# Patient Record
Sex: MALE | Race: WHITE | NOT HISPANIC OR LATINO | Employment: OTHER | URBAN - NONMETROPOLITAN AREA
[De-identification: names, ages, dates, MRNs, and addresses within clinical notes are randomized per-mention and may not be internally consistent; named-entity substitution may affect disease eponyms.]

---

## 2017-01-12 ENCOUNTER — HOSPITAL ENCOUNTER (EMERGENCY)
Facility: HOSPITAL | Age: 67
Discharge: HOME | End: 2017-01-12
Attending: NURSE PRACTITIONER | Admitting: NURSE PRACTITIONER
Payer: MEDICARE

## 2017-01-12 VITALS
DIASTOLIC BLOOD PRESSURE: 100 MMHG | TEMPERATURE: 98.8 F | RESPIRATION RATE: 20 BRPM | SYSTOLIC BLOOD PRESSURE: 185 MMHG | OXYGEN SATURATION: 97 % | HEART RATE: 90 BPM

## 2017-01-12 DIAGNOSIS — J01.00 ACUTE MAXILLARY SINUSITIS, RECURRENCE NOT SPECIFIED: ICD-10-CM

## 2017-01-12 LAB
FLUAV+FLUBV AG SPEC QL: NEGATIVE
FLUAV+FLUBV AG SPEC QL: NORMAL
SPECIMEN SOURCE: NORMAL

## 2017-01-12 PROCEDURE — 87804 INFLUENZA ASSAY W/OPTIC: CPT | Mod: 59 | Performed by: FAMILY MEDICINE

## 2017-01-12 PROCEDURE — 99213 OFFICE O/P EST LOW 20 MIN: CPT | Performed by: NURSE PRACTITIONER

## 2017-01-12 PROCEDURE — 99213 OFFICE O/P EST LOW 20 MIN: CPT

## 2017-01-12 RX ORDER — BENZONATATE 100 MG/1
100 CAPSULE ORAL 3 TIMES DAILY PRN
Qty: 30 CAPSULE | Refills: 0 | Status: SHIPPED | OUTPATIENT
Start: 2017-01-12 | End: 2017-06-26

## 2017-01-12 RX ORDER — DOXYCYCLINE 100 MG/1
100 CAPSULE ORAL 2 TIMES DAILY
Qty: 20 CAPSULE | Refills: 0 | Status: SHIPPED | OUTPATIENT
Start: 2017-01-12 | End: 2017-01-22

## 2017-01-12 ASSESSMENT — ENCOUNTER SYMPTOMS
SORE THROAT: 1
COUGH: 1
ACTIVITY CHANGE: 0
SINUS PRESSURE: 1
TROUBLE SWALLOWING: 0
NAUSEA: 0
FEVER: 0
VOMITING: 0
DYSURIA: 0
WHEEZING: 0
PSYCHIATRIC NEGATIVE: 1
APPETITE CHANGE: 0
CHILLS: 0
RHINORRHEA: 1
SHORTNESS OF BREATH: 0

## 2017-01-12 NOTE — ED AVS SNAPSHOT
[facility]    [address] [address]    Phone:  [phone]                                       Donis Watt   MRN: [id number]    Department[facility]  [facility]   Date of Visit:  1/12/2017           Patient Information     Date Of Birth          1950        Your diagnoses for this visit were:     Acute maxillary sinusitis, recurrence not specified        You were seen by Mary Burton NP.      Follow-up Information     Follow up with Edinson London DO.    Specialty:  Family Practice    Why:  As needed, If symptoms worsen    Contact information:    [facility]  [address]  [facility] [address] [phone]          Follow up with [facility].    Specialty:  EMERGENCY MEDICINE    Why:  As needed, If symptoms worsen    Contact information:    [address] [address] [phone]    Additional information:    From [place]: Take [address]. Turn left at [address]. Turn left at the first stoplight on [address]. At the first stop sign, take a right onto [address]. Take a left into the parking lot and continue through until you reach the North enterance of the building.       From [place]: Take [address]. Take the [address] ramp towards [place]. Turn left onto [address]. Take a slight right onto [address]. Turn left at the first stoplight on [address]. At the first stop sign, take a right onto [address]. Take a left into the parking lot and continue through until you reach the North enterance of the building.       From [place]: Take [address]. Take a right at [address]. At the first stop sign, take a right onto [address]. Take a left into the parking lot and continue through until you reach the North enterance of the building.         Discharge Instructions       Take antibiotics as ordered.   Use  tessalon pearls as needed for cough as directed.   Increase fluid intake.  Rest.  Sleep in a recliner or prop extra pillows under head until symptoms subside.   Follow up with PCP with any increase in symptoms or concerns.   Return to urgent care or emergency department with any increase in symptoms or concerns.     Discharge References/Attachments     SINUSITIS (ANTIBIOTIC TREATMENT) (ENGLISH)         Review of your medicines      START taking        Dose / Directions Last dose taken    benzonatate 100 MG capsule   Commonly known as:  TESSALON   Dose:  100 mg   Quantity:  30 capsule        Take 1 capsule (100 mg) by mouth 3 times daily as needed for cough   Refills:  0        doxycycline 100 MG capsule   Commonly known as:  VIBRAMYCIN   Dose:  100 mg   Quantity:  20 capsule        Take 1 capsule (100 mg) by mouth 2 times daily for 10 days   Refills:  0          Our records show that you are taking the medicines listed below. If these are incorrect, please call your family doctor or clinic.        Dose / Directions Last dose taken    CRESTOR PO   Dose:  10 mg        Take 10 mg by mouth   Refills:  0        dextromethorphan-guaiFENesin  MG per 12 hr tablet   Commonly known as:  MUCINEX DM   Dose:  1 tablet        Take 1 tablet by mouth every 12 hours   Refills:  0        FLOMAX 0.4 MG capsule   Dose:  0.4 mg   Generic drug:  tamsulosin        Take 0.4 mg by mouth daily   Refills:  0        METFORMIN HCL PO   Dose:  500 mg        Take 500 mg by mouth 2 times daily (with meals)   Refills:  0        PANTOPRAZOLE SODIUM PO   Dose:  40 mg        Take 40 mg by mouth every morning (before breakfast)   Refills:  0        RAMIPRIL PO   Dose:  7.5 mg        Take 7.5 mg by mouth daily   Refills:  0        VITAMIN D (CHOLECALCIFEROL) PO   Dose:  400 Units        Take 400 Units by mouth 2 times daily   Refills:  0                Prescriptions were sent or printed at these locations (2 Prescriptions)                  "  [id number] - [place], [place] - [address] AT [facility] [address]   [address], [place] [address]    Telephone:  [phone]   Fax:  [phone]   Hours:                  E-Prescribed (2 of 2)         benzonatate (TESSALON) 100 MG capsule               doxycycline (VIBRAMYCIN) 100 MG capsule                Procedures and tests performed during your visit     Influenza A/B antigen      Orders Needing Specimen Collection     None      Pending Results     No orders found from 2017 to 2017.            Pending Culture Results     No orders found from 2017 to 2017.            Thank you for choosing [facility]       Thank you for choosing [facility] for your care. Our goal is always to provide you with excellent care. Hearing back from our patients is one way we can continue to improve our services. Please take a few minutes to complete the written survey that you may receive in the mail after you visit with us. Thank you!        [employer] Information     [employer] lets you send messages to your doctor, view your test results, renew your prescriptions, schedule appointments and more. To sign up, go to [email or web address] . Click on \"Log in\" on the left side of the screen, which will take you to the Welcome page. Then click on \"Sign up Now\" on the right side of the page.     You will be asked to enter the access code listed below, as well as some personal information. Please follow the directions to create your username and password.     Your access code is: [email or web address]  Expires: 2017  4:36 PM     Your access code will  in 90 days. If you need help or a new code, please call your [facility] clinic or [phone].        Care EveryWhere ID     This is your Care EveryWhere ID. This could be used by other organizations to access your [facility] medical records  [id number]        After Visit Summary       This is your record. Keep this with you and show to your community " pharmacist(s) and doctor(s) at your next visit.

## 2017-01-12 NOTE — ED PROVIDER NOTES
"  History     Chief Complaint   Patient presents with     URI     The history is provided by the patient. No  was used.     Donis Watt is a 66 year old male who presents with a cough, sinus pressure, and plugged ears for 6 days. He has taken Advil, muccinex, dayquil and Nyquil with mild effectiveness. Denies fever, chills, or night sweats. Eating and drinking well. Bowel and bladder are working well.  No fluctuations in blood glucose.     I have reviewed the Medications, Allergies, Past Medical and Surgical History, and Social History in the Epic system.    Review of Systems   Constitutional: Negative for fever, chills, activity change and appetite change.   HENT: Positive for congestion, ear pain, postnasal drip, rhinorrhea, sinus pressure and sore throat. Negative for ear discharge and trouble swallowing.    Eyes: Positive for discharge. Negative for photophobia, pain, redness, itching and visual disturbance.        \"watering eyes.\"   Respiratory: Positive for cough. Negative for shortness of breath and wheezing.    Cardiovascular: Negative for chest pain.   Gastrointestinal: Negative for nausea and vomiting.   Genitourinary: Negative for dysuria.   Skin: Negative for rash.   Psychiatric/Behavioral: Negative.        Physical Exam   BP: (!) 185/100 mmHg  Pulse: 90  Temp: 98.8  F (37.1  C)  Resp: 20  SpO2: 97 %  Physical Exam   Constitutional: He is oriented to person, place, and time. He appears well-developed and well-nourished. No distress.   HENT:   Left Ear: External ear normal.   Mouth/Throat: No oropharyngeal exudate.   Right middle ear effusion. No redness. Bilateral TM's intact. Maxillary sinus tenderness on palpation.    Eyes: Conjunctivae and EOM are normal. Pupils are equal, round, and reactive to light. Right eye exhibits no discharge. Left eye exhibits no discharge.   Neck: Normal range of motion. Neck supple.   Cardiovascular: Normal rate, regular rhythm and normal heart " sounds.    Pulmonary/Chest: Effort normal. No respiratory distress. He has no wheezes. He has no rales.   Abdominal: Soft. He exhibits no distension.   Musculoskeletal: Normal range of motion.   Lymphadenopathy:     He has no cervical adenopathy.   Neurological: He is alert and oriented to person, place, and time.   Skin: Skin is warm and dry. No rash noted. He is not diaphoretic.   Psychiatric: He has a normal mood and affect. His behavior is normal.   Nursing note and vitals reviewed.      ED Course   Procedures      Labs Ordered and Resulted from Time of ED Arrival Up to the Time of Departure from the ED   INFLUENZA A/B ANTIGEN     Results for orders placed or performed during the hospital encounter of 01/12/17   Influenza A/B antigen   Result Value Ref Range    Influenza A/B Agn Specimen Nares     Influenza A Negative NEG    Influenza B  NEG     Negative   Test results must be correlated with clinical data. If necessary, results   should be confirmed by a molecular assay or viral culture.       Ordered for BP check prior to discharge. LPN aware and verbalized understanding.     Assessments & Plan (with Medical Decision Making)     Discussed plan of care. Discussed viral syndrome versus bacterial. He wishes to be treated with antibiotics. Discussed if virus is causing symptoms that antibiotics won't be effective. Encouraged to monitor BP and follow up with PCP if elevation remains. Verbalized understanding.     I have reviewed the nursing notes.    I have reviewed the findings, diagnosis, plan and need for follow up with the patient.  Discharged in stable condition.     Discharge Medication List as of 1/12/2017  4:36 PM      START taking these medications    Details   benzonatate (TESSALON) 100 MG capsule Take 1 capsule (100 mg) by mouth 3 times daily as needed for cough, Disp-30 capsule, R-0, E-Prescribe      doxycycline (VIBRAMYCIN) 100 MG capsule Take 1 capsule (100 mg) by mouth 2 times daily for 10 days,  Disp-20 capsule, R-0, E-Prescribe             Final diagnoses:   Acute maxillary sinusitis, recurrence not specified     Take antibiotics as ordered.   Use tessalon pearls as needed for cough as directed.   Increase fluid intake.  Rest.  Sleep in a recliner or prop extra pillows under head until symptoms subside.   Follow up with PCP with any increase in symptoms or concerns.   Return to urgent care or emergency department with any increase in symptoms or concerns.     SHANNON Trevino  1/12/2017  4:04 PM  [facility]        Mary Burton NP  01/16/17 0900

## 2017-01-12 NOTE — ED AVS SNAPSHOT
[facility]    [address]    [place] [address]    Phone:  [phone]                                       Donis Watt   MRN: [id number]    Department[facility]  [facility]   Date of Visit:  1/12/2017           After Visit Summary Signature Page     I have received my discharge instructions, and my questions have been answered. I have discussed any challenges I see with this plan with the nurse or doctor.    ..........................................................................................................................................  Patient/Patient Representative Signature      ..........................................................................................................................................  Patient Representative Print Name and Relationship to Patient    ..................................................               ................................................  Date                                            Time    ..........................................................................................................................................  Reviewed by Signature/Title    ...................................................              ..............................................  Date                                                            Time

## 2017-01-12 NOTE — ED NOTES
Patient came in with nasal ,sinus congestion with watery eyes. Headache. Patient has experiences these symptoms since friday. Been taking mucinex 600 mg & cold & flu day & nighttime. Temp 98.8

## 2017-01-16 ASSESSMENT — ENCOUNTER SYMPTOMS
PHOTOPHOBIA: 0
EYE ITCHING: 0
EYE REDNESS: 0
EYE PAIN: 0
EYE DISCHARGE: 1

## 2017-01-16 NOTE — DISCHARGE INSTRUCTIONS
Take antibiotics as ordered.   Use tessalon pearls as needed for cough as directed.   Increase fluid intake.  Rest.  Sleep in a recliner or prop extra pillows under head until symptoms subside.   Follow up with PCP with any increase in symptoms or concerns.   Return to urgent care or emergency department with any increase in symptoms or concerns.

## 2017-06-26 ENCOUNTER — HOSPITAL ENCOUNTER (EMERGENCY)
Facility: HOSPITAL | Age: 67
Discharge: HOME | End: 2017-06-26
Attending: PHYSICIAN ASSISTANT | Admitting: PHYSICIAN ASSISTANT
Payer: MEDICARE

## 2017-06-26 VITALS
DIASTOLIC BLOOD PRESSURE: 88 MMHG | OXYGEN SATURATION: 95 % | HEART RATE: 75 BPM | RESPIRATION RATE: 20 BRPM | SYSTOLIC BLOOD PRESSURE: 120 MMHG | TEMPERATURE: 98.4 F

## 2017-06-26 DIAGNOSIS — R42 DIZZINESS: ICD-10-CM

## 2017-06-26 LAB
ALBUMIN SERPL-MCNC: 3.6 G/DL (ref 3.4–5)
ALBUMIN UR-MCNC: NEGATIVE MG/DL
ALP SERPL-CCNC: 85 U/L (ref 40–150)
ALT SERPL W P-5'-P-CCNC: 36 U/L (ref 0–70)
ANION GAP SERPL CALCULATED.3IONS-SCNC: 8 MMOL/L (ref 3–14)
APPEARANCE UR: CLEAR
AST SERPL W P-5'-P-CCNC: 17 U/L (ref 0–45)
BASOPHILS # BLD AUTO: 0.1 10E9/L (ref 0–0.2)
BASOPHILS NFR BLD AUTO: 0.7 %
BILIRUB SERPL-MCNC: 0.4 MG/DL (ref 0.2–1.3)
BILIRUB UR QL STRIP: NEGATIVE
BUN SERPL-MCNC: 21 MG/DL (ref 7–30)
CALCIUM SERPL-MCNC: 8.6 MG/DL (ref 8.5–10.1)
CHLORIDE SERPL-SCNC: 108 MMOL/L (ref 94–109)
CO2 SERPL-SCNC: 24 MMOL/L (ref 20–32)
COLOR UR AUTO: ABNORMAL
CREAT SERPL-MCNC: 0.86 MG/DL (ref 0.66–1.25)
DIFFERENTIAL METHOD BLD: NORMAL
EOSINOPHIL # BLD AUTO: 0.1 10E9/L (ref 0–0.7)
EOSINOPHIL NFR BLD AUTO: 1.5 %
ERYTHROCYTE [DISTWIDTH] IN BLOOD BY AUTOMATED COUNT: 13.6 % (ref 10–15)
GFR SERPL CREATININE-BSD FRML MDRD: 89 ML/MIN/1.7M2
GLUCOSE SERPL-MCNC: 284 MG/DL (ref 70–99)
GLUCOSE UR STRIP-MCNC: >1000 MG/DL
HCT VFR BLD AUTO: 44.5 % (ref 40–53)
HGB BLD-MCNC: 14.8 G/DL (ref 13.3–17.7)
HGB UR QL STRIP: NEGATIVE
IMM GRANULOCYTES # BLD: 0 10E9/L (ref 0–0.4)
IMM GRANULOCYTES NFR BLD: 0.5 %
KETONES UR STRIP-MCNC: NEGATIVE MG/DL
LEUKOCYTE ESTERASE UR QL STRIP: NEGATIVE
LYMPHOCYTES # BLD AUTO: 1.8 10E9/L (ref 0.8–5.3)
LYMPHOCYTES NFR BLD AUTO: 20.8 %
MCH RBC QN AUTO: 28.6 PG (ref 26.5–33)
MCHC RBC AUTO-ENTMCNC: 33.3 G/DL (ref 31.5–36.5)
MCV RBC AUTO: 86 FL (ref 78–100)
MONOCYTES # BLD AUTO: 0.8 10E9/L (ref 0–1.3)
MONOCYTES NFR BLD AUTO: 9.3 %
NEUTROPHILS # BLD AUTO: 5.7 10E9/L (ref 1.6–8.3)
NEUTROPHILS NFR BLD AUTO: 67.2 %
NITRATE UR QL: NEGATIVE
NRBC # BLD AUTO: 0 10*3/UL
NRBC BLD AUTO-RTO: 0 /100
PH UR STRIP: 5 PH (ref 4.7–8)
PLATELET # BLD AUTO: 187 10E9/L (ref 150–450)
POTASSIUM SERPL-SCNC: 3.9 MMOL/L (ref 3.4–5.3)
PROT SERPL-MCNC: 6.9 G/DL (ref 6.8–8.8)
RBC # BLD AUTO: 5.17 10E12/L (ref 4.4–5.9)
SODIUM SERPL-SCNC: 140 MMOL/L (ref 133–144)
SP GR UR STRIP: 1.03 (ref 1–1.03)
TROPONIN I SERPL-MCNC: NORMAL UG/L (ref 0–0.04)
TROPONIN I SERPL-MCNC: NORMAL UG/L (ref 0–0.04)
URN SPEC COLLECT METH UR: ABNORMAL
UROBILINOGEN UR STRIP-MCNC: NORMAL MG/DL (ref 0–2)
WBC # BLD AUTO: 8.5 10E9/L (ref 4–11)

## 2017-06-26 PROCEDURE — 25000132 ZZH RX MED GY IP 250 OP 250 PS 637: Mod: GY | Performed by: PHYSICIAN ASSISTANT

## 2017-06-26 PROCEDURE — 96360 HYDRATION IV INFUSION INIT: CPT

## 2017-06-26 PROCEDURE — 84484 ASSAY OF TROPONIN QUANT: CPT | Mod: 91 | Performed by: PHYSICIAN ASSISTANT

## 2017-06-26 PROCEDURE — 70450 CT HEAD/BRAIN W/O DYE: CPT | Mod: TC

## 2017-06-26 PROCEDURE — 93005 ELECTROCARDIOGRAM TRACING: CPT

## 2017-06-26 PROCEDURE — 99284 EMERGENCY DEPT VISIT MOD MDM: CPT | Performed by: PHYSICIAN ASSISTANT

## 2017-06-26 PROCEDURE — 25000128 H RX IP 250 OP 636: Performed by: PHYSICIAN ASSISTANT

## 2017-06-26 PROCEDURE — A9270 NON-COVERED ITEM OR SERVICE: HCPCS | Mod: GY | Performed by: PHYSICIAN ASSISTANT

## 2017-06-26 PROCEDURE — 96361 HYDRATE IV INFUSION ADD-ON: CPT

## 2017-06-26 PROCEDURE — 85025 COMPLETE CBC W/AUTO DIFF WBC: CPT | Performed by: PHYSICIAN ASSISTANT

## 2017-06-26 PROCEDURE — 93010 ELECTROCARDIOGRAM REPORT: CPT | Performed by: INTERNAL MEDICINE

## 2017-06-26 PROCEDURE — 36415 COLL VENOUS BLD VENIPUNCTURE: CPT | Performed by: PHYSICIAN ASSISTANT

## 2017-06-26 PROCEDURE — 99285 EMERGENCY DEPT VISIT HI MDM: CPT | Mod: 25

## 2017-06-26 PROCEDURE — 81003 URINALYSIS AUTO W/O SCOPE: CPT | Performed by: PHYSICIAN ASSISTANT

## 2017-06-26 PROCEDURE — 80053 COMPREHEN METABOLIC PANEL: CPT | Performed by: PHYSICIAN ASSISTANT

## 2017-06-26 RX ORDER — LIDOCAINE 40 MG/G
CREAM TOPICAL
Status: DISCONTINUED | OUTPATIENT
Start: 2017-06-26 | End: 2017-06-26 | Stop reason: HOSPADM

## 2017-06-26 RX ORDER — ASPIRIN 81 MG/1
162 TABLET, CHEWABLE ORAL ONCE
Status: COMPLETED | OUTPATIENT
Start: 2017-06-26 | End: 2017-06-26

## 2017-06-26 RX ORDER — SODIUM CHLORIDE 9 MG/ML
1000 INJECTION, SOLUTION INTRAVENOUS CONTINUOUS
Status: DISCONTINUED | OUTPATIENT
Start: 2017-06-26 | End: 2017-06-26 | Stop reason: HOSPADM

## 2017-06-26 RX ADMIN — ASPIRIN 81 MG 162 MG: 81 TABLET ORAL at 21:35

## 2017-06-26 RX ADMIN — SODIUM CHLORIDE 1000 ML: 9 INJECTION, SOLUTION INTRAVENOUS at 17:37

## 2017-06-26 RX ADMIN — SODIUM CHLORIDE 1000 ML: 9 INJECTION, SOLUTION INTRAVENOUS at 19:56

## 2017-06-26 ASSESSMENT — ENCOUNTER SYMPTOMS
SHORTNESS OF BREATH: 0
DIARRHEA: 0
APPETITE CHANGE: 0
VOMITING: 0
WEAKNESS: 0
DIZZINESS: 1
SPEECH DIFFICULTY: 0
ABDOMINAL PAIN: 0
LIGHT-HEADEDNESS: 1
ACTIVITY CHANGE: 0
PALPITATIONS: 0
NAUSEA: 0
CHILLS: 0
NUMBNESS: 0
CHEST TIGHTNESS: 0
PHOTOPHOBIA: 0
FEVER: 0
HEADACHES: 0

## 2017-06-26 NOTE — ED NOTES
"Reports having \"syncopal episode approx 45 minutes PTA to ER\" Wife reported \"he reported a sudden onset of dizziness and stated he just didn't feel right and was able to lower himself to the ground using treadmill to support himself at home.\" Denies other symptoms, just feels tired and wiped out.\" See assessments. Wife remains at bedside. Brought to room 9, monitoring placed and EKG being obtained.   "

## 2017-06-26 NOTE — ED NOTES
During Ortho Stats. pt. Denied feeling dizzy, lightheaded,or s/s of n/v. Finding was told to the nurse

## 2017-06-26 NOTE — ED AVS SNAPSHOT
[facility]    [address]    [place] [address]    Phone:  [phone]                                       Donis Watt   MRN: [id number]    Department[facility]  [facility]   Date of Visit:  6/26/2017           After Visit Summary Signature Page     I have received my discharge instructions, and my questions have been answered. I have discussed any challenges I see with this plan with the nurse or doctor.    ..........................................................................................................................................  Patient/Patient Representative Signature      ..........................................................................................................................................  Patient Representative Print Name and Relationship to Patient    ..................................................               ................................................  Date                                            Time    ..........................................................................................................................................  Reviewed by Signature/Title    ...................................................              ..............................................  Date                                                            Time

## 2017-06-26 NOTE — ED AVS SNAPSHOT
[facility]    [address] [address]    Phone:  [phone]                                       Donis Watt   MRN: [id number]    [facility]   Date of Visit:  6/26/2017           Patient Information     Date Of Birth          1950        Your diagnoses for this visit were:     Dizziness        You were seen by Arianna Rosas PA-C.      Follow-up Information     Schedule an appointment as soon as possible for a visit with Edinson London DO.    Specialty:  Family Practice    Contact information:    [facility]  [address]  [facility] [address]  [phone]          Follow up with [facility].    Specialty:  EMERGENCY MEDICINE    Why:  If symptoms worsen    Contact information:    [address] [address] [phone]    Additional information:    From [facility]: Take [address]. Turn left at [address]. Turn left at the first stoplight on [address]. At the first stop sign, take a right onto [address]. Take a left into the parking lot and continue through until you reach the North enterance of the building.       From [place]: Take [address]. Take the [address] ramp towards [place]. Turn left onto [address]. Take a slight right onto [address]. Turn left at the first stoplight on [address]. At the first stop sign, take a right onto [address]. Take a left into the parking lot and continue through until you reach the North enterance of the building.       From [place]: Take [address]. Take a right at [address]. At the first stop sign, take a right onto [address]. Take a left into the parking lot and continue through until you reach the North enterance of the building.         Discharge Instructions         Dizziness (Uncertain Cause)  Dizziness is a common symptom. It may be described as  lightheadedness, spinning, or feeling like you are going to faint. Dizziness can have many causes.  Be sure to tell the healthcare provider about:    All medicines you take, including prescription, over-the-counter, herbs, and supplements    Any other symptoms you have    Any health problems you are being treated for    Anything that causes the dizziness to get worse or better  Today's exam did not show an exact cause for your dizziness. Other tests may be needed. Follow up with your healthcare provider.  Home care    Dizziness that occurs with sudden standing may be a sign of mild dehydration. Drink extra fluids for the next few days.    If you recently started a new medicine, stopped a medicine, or had the dose of a current medicine changed, talk with the prescribing healthcare provider. Your medicine plan may need adjustment.    If dizziness lasts more than a few seconds, sit or lie down until it passes. This may help prevent injury in case you pass out.    Do not drive or use power tools or dangerous equipment until you have had no dizziness for at least 48 hours.  Follow-up care  Follow up with your healthcare provider for further evaluation within the next 7 days or as advised.  When to seek medical advice  Call your healthcare provider for any of the following:    Worsening of symptoms or new symptoms    Passing out or seizure    Repeated vomiting    Headache    Palpitations (the sense that your heart is fluttering or beating fast or hard)    Shortness of breath    Blood in vomit or stool (black or red color)    Weakness of an arm or leg or one side of the face    Vision or hearing changes    Trouble walking or speaking    Chest, arm, neck, back, or jaw pain  Date Last Reviewed: 8/23/2015 2000-2017 [employer]. [address] [place] [address], [place] [address]. All rights reserved. This information is not intended as a substitute for professional medical care. Always follow your healthcare  professional's instructions.      It would likely be in your best interest to start a baby aspirin daily.     This seem most consistent with vertigo after moving your head to take a drink, but sometimes these symptoms can represent an underlying brain perfusion issue.     Please follow-up with Dr. London this week for recheck.     Please return HERE for ANY worsening or other concerns whatsoever.        Review of your medicines      START taking        Dose / Directions Last dose taken    aspirin 81 MG tablet   Dose:  81 mg        Take 1 tablet (81 mg) by mouth daily   Refills:  OTC          Our records show that you are taking the medicines listed below. If these are incorrect, please call your family doctor or clinic.        Dose / Directions Last dose taken    canaliflozin tablet   Commonly known as:  INVOKANA        Take by mouth every morning (before breakfast)   Refills:  0        CRESTOR PO   Dose:  10 mg        Take 10 mg by mouth   Refills:  0        FLOMAX 0.4 MG capsule   Dose:  0.4 mg   Generic drug:  tamsulosin        Take 0.4 mg by mouth daily   Refills:  0        LASIX PO        Take by mouth daily   Refills:  0        PANTOPRAZOLE SODIUM PO   Dose:  40 mg        Take 40 mg by mouth every morning (before breakfast)   Refills:  0        RAMIPRIL PO   Dose:  7.5 mg        Take 7.5 mg by mouth daily   Refills:  0        VITAMIN D (CHOLECALCIFEROL) PO   Dose:  2000 Units        Take 2,000 Units by mouth 2 times daily   Refills:  0                Prescriptions were sent or printed at these locations (1 Prescription)                   Other Prescriptions                Not Printed or Sent (1 of 1)         aspirin 81 MG tablet                Procedures and tests performed during your visit     Procedure/Test Number of Times Performed    CBC with platelets differential 1    CT Head w/o Contrast 1    Comprehensive metabolic panel 1    EKG 12-lead, tracing only 1    Peripheral IV catheter 1    Troponin I 2     "UA reflex to Microscopic and Culture 1      Orders Needing Specimen Collection     None      Pending Results     Date and Time Order Name Status Description    2017 1925 CT Head w/o Contrast In process             Pending Culture Results     No orders found from 2017 to 2017.            Thank you for choosing [facility]       Thank you for choosing [facility] for your care. Our goal is always to provide you with excellent care. Hearing back from our patients is one way we can continue to improve our services. Please take a few minutes to complete the written survey that you may receive in the mail after you visit with us. Thank you!        [employer] Information     [employer] lets you send messages to your doctor, view your test results, renew your prescriptions, schedule appointments and more. To sign up, go to [email or web address] . Click on \"Log in\" on the left side of the screen, which will take you to the Welcome page. Then click on \"Sign up Now\" on the right side of the page.     You will be asked to enter the access code listed below, as well as some personal information. Please follow the directions to create your username and password.     Your access code is: [email or web address]  Expires: 2017  9:30 PM     Your access code will  in 90 days. If you need help or a new code, please call your [facility] clinic or [phone].        Care EveryWhere ID     This is your Care EveryWhere ID. This could be used by other organizations to access your [facility] medical records  [id number]        Equal Access to Services     EDGARDO PALOMO : Hadii kenisha bailey Sotony, waaxda luqadaha, qaybta kaalmada luanne horton . So [facility] [phone].    ATENCIÓN: Si habla español, tiene a hernandez disposición servicios gratuitos de asistencia lingüística. Llame al [phone].    We comply with applicable federal civil rights laws and [place] laws. We do not discriminate on the " basis of race, color, national origin, age, disability sex, sexual orientation or gender identity.            After Visit Summary       This is your record. Keep this with you and show to your community pharmacist(s) and doctor(s) at your next visit.

## 2017-06-27 NOTE — DISCHARGE INSTRUCTIONS
Dizziness (Uncertain Cause)  Dizziness is a common symptom. It may be described as lightheadedness, spinning, or feeling like you are going to faint. Dizziness can have many causes.  Be sure to tell the healthcare provider about:    All medicines you take, including prescription, over-the-counter, herbs, and supplements    Any other symptoms you have    Any health problems you are being treated for    Anything that causes the dizziness to get worse or better  Today's exam did not show an exact cause for your dizziness. Other tests may be needed. Follow up with your healthcare provider.  Home care    Dizziness that occurs with sudden standing may be a sign of mild dehydration. Drink extra fluids for the next few days.    If you recently started a new medicine, stopped a medicine, or had the dose of a current medicine changed, talk with the prescribing healthcare provider. Your medicine plan may need adjustment.    If dizziness lasts more than a few seconds, sit or lie down until it passes. This may help prevent injury in case you pass out.    Do not drive or use power tools or dangerous equipment until you have had no dizziness for at least 48 hours.  Follow-up care  Follow up with your healthcare provider for further evaluation within the next 7 days or as advised.  When to seek medical advice  Call your healthcare provider for any of the following:    Worsening of symptoms or new symptoms    Passing out or seizure    Repeated vomiting    Headache    Palpitations (the sense that your heart is fluttering or beating fast or hard)    Shortness of breath    Blood in vomit or stool (black or red color)    Weakness of an arm or leg or one side of the face    Vision or hearing changes    Trouble walking or speaking    Chest, arm, neck, back, or jaw pain  Date Last Reviewed: 8/23/2015 2000-2017 [employer]. [address] [place] [address], [place] [address]. All rights reserved. This information is not intended as  a substitute for professional medical care. Always follow your healthcare professional's instructions.      It would likely be in your best interest to start a baby aspirin daily.     This seem most consistent with vertigo after moving your head to take a drink, but sometimes these symptoms can represent an underlying brain perfusion issue.     Please follow-up with Dr. London this week for recheck.     Please return HERE for ANY worsening or other concerns whatsoever.

## 2017-06-27 NOTE — ED NOTES
Face to face report given with opportunity to observe patient.    Report given to Alexsandra TALLEY   6/26/2017  7:13 PM

## 2017-06-27 NOTE — ED PROVIDER NOTES
"  History     Chief Complaint   Patient presents with     Dizziness     started 30 minutes ago     The history is provided by the patient.     Donis Watt is a 67 year old male who presented to the ED ambulatory along with wife for evaluation of dizziness.  He reports feeling well throughout the day.  He walked to the fridge to get some soda and tipped his head back to take a drink.  He developed acute vertiginous symptoms of unsteadiness and feeling of wanting to fall backwards.  This resolved quickly, but returned.  He presented here feeling \"a little woozy\" but otherwise feels well.  No headaches.  No falls.  No blood thinners. No chest pain.  No palpitations.      I have reviewed the Medications, Allergies, Past Medical and Surgical History, and Social History in the Epic system.    Allergies:   Allergies   Allergen Reactions     Codeine      Anxiety.        No Clinical Screening - See Comments      All Narcotics makes him anxious.          No current facility-administered medications on file prior to encounter.   Current Outpatient Prescriptions on File Prior to Encounter:  VITAMIN D, CHOLECALCIFEROL, PO Take 2,000 Units by mouth 2 times daily    Rosuvastatin Calcium (CRESTOR PO) Take 10 mg by mouth   tamsulosin (FLOMAX) 0.4 MG 24 hr capsule Take 0.4 mg by mouth daily   PANTOPRAZOLE SODIUM PO Take 40 mg by mouth every morning (before breakfast)   RAMIPRIL PO Take 7.5 mg by mouth daily       There is no problem list on file for this patient.      No past surgical history on file.    Social History   Substance Use Topics     Smoking status: Former Smoker     Smokeless tobacco: Never Used     Alcohol use Yes      Comment: social         There is no immunization history on file for this patient.    BMI: There is no height or weight on file to calculate BMI.      Review of Systems   Constitutional: Negative for activity change, appetite change, chills and fever.   Eyes: Negative for photophobia and visual " disturbance.   Respiratory: Negative for chest tightness and shortness of breath.    Cardiovascular: Negative for chest pain and palpitations.   Gastrointestinal: Negative for abdominal pain, diarrhea, nausea and vomiting.   Genitourinary: Negative.    Skin: Negative.    Neurological: Positive for dizziness and light-headedness. Negative for syncope, speech difficulty, weakness, numbness and headaches.       Physical Exam   BP: 125/82  Pulse: 75  Heart Rate: 89  Temp: 98.4  F (36.9  C)  Resp: 20  SpO2: 92 %  Physical Exam   Constitutional: He is oriented to person, place, and time. He appears well-developed and well-nourished. No distress.   Pleasant and talkative    Cardiovascular: Normal rate and regular rhythm.    Pulmonary/Chest: Effort normal and breath sounds normal.   Abdominal: Soft. There is no tenderness. There is no guarding.   Obese    Neurological: He is alert and oriented to person, place, and time.   Neurological examination:  That the patient was awake and alert, the attention, orientation, concentration, language, memory and fund of knowledge were all normal.  The patient had no neglect.    Normal speech   Normal gait.   Normal finger to nose.   Normal rapid finger movement     Cranial nerve examination: revealed that for cranial nerve   II: the pupils were reactive and the visual field were full  III, IV, and VI, the extraocular movements were full.    VII: facial movements are symmetric  VIII: hearing intact to voice  IX & X: the soft palate rises symmetrically   XI: shoulder movements are symmetric  XII: tongue is midline   Skin: Skin is warm and dry.   Psychiatric: He has a normal mood and affect.   Nursing note and vitals reviewed.      ED Course     ED Course     Procedures        EKG shows a NSR with signs of incomplete RBBB.  There is nonspecific T wave inversions in V2.  No previous for comparison.      CT brain negative.      Medications   lidocaine 1 % 1 mL (not administered)   lidocaine  (LMX4) kit (not administered)   sodium chloride (PF) 0.9% PF flush 3 mL (not administered)   sodium chloride (PF) 0.9% PF flush 3 mL (3 mLs Intracatheter Given 6/26/17 1737)   0.9% sodium chloride BOLUS (0 mLs Intravenous Stopped 6/26/17 1934)     Followed by   0.9% sodium chloride infusion (0 mLs Intravenous Stopped 6/26/17 2131)   aspirin chewable tablet 162 mg (162 mg Oral Given 6/26/17 2135)     Results for orders placed or performed during the hospital encounter of 06/26/17 (from the past 24 hour(s))   CBC with platelets differential   Result Value Ref Range    WBC 8.5 4.0 - 11.0 10e9/L    RBC Count 5.17 4.4 - 5.9 10e12/L    Hemoglobin 14.8 13.3 - 17.7 g/dL    Hematocrit 44.5 40.0 - 53.0 %    MCV 86 78 - 100 fl    MCH 28.6 26.5 - 33.0 pg    MCHC 33.3 31.5 - 36.5 g/dL    RDW 13.6 10.0 - 15.0 %    Platelet Count 187 150 - 450 10e9/L    Diff Method Automated Method     % Neutrophils 67.2 %    % Lymphocytes 20.8 %    % Monocytes 9.3 %    % Eosinophils 1.5 %    % Basophils 0.7 %    % Immature Granulocytes 0.5 %    Nucleated RBCs 0 0 /100    Absolute Neutrophil 5.7 1.6 - 8.3 10e9/L    Absolute Lymphocytes 1.8 0.8 - 5.3 10e9/L    Absolute Monocytes 0.8 0.0 - 1.3 10e9/L    Absolute Eosinophils 0.1 0.0 - 0.7 10e9/L    Absolute Basophils 0.1 0.0 - 0.2 10e9/L    Abs Immature Granulocytes 0.0 0 - 0.4 10e9/L    Absolute Nucleated RBC 0.0    Comprehensive metabolic panel   Result Value Ref Range    Sodium 140 133 - 144 mmol/L    Potassium 3.9 3.4 - 5.3 mmol/L    Chloride 108 94 - 109 mmol/L    Carbon Dioxide 24 20 - 32 mmol/L    Anion Gap 8 3 - 14 mmol/L    Glucose 284 (H) 70 - 99 mg/dL    Urea Nitrogen 21 7 - 30 mg/dL    Creatinine 0.86 0.66 - 1.25 mg/dL    GFR Estimate 89 >60 mL/min/1.7m2    GFR Estimate If Black >90   GFR Calc   >60 mL/min/1.7m2    Calcium 8.6 8.5 - 10.1 mg/dL    Bilirubin Total 0.4 0.2 - 1.3 mg/dL    Albumin 3.6 3.4 - 5.0 g/dL    Protein Total 6.9 6.8 - 8.8 g/dL    Alkaline Phosphatase 85  40 - 150 U/L    ALT 36 0 - 70 U/L    AST 17 0 - 45 U/L   Troponin I   Result Value Ref Range    Troponin I ES  0.000 - 0.045 ug/L     <0.015  The 99th percentile for upper reference range is 0.045 ug/L.  Troponin values in   the range of 0.045 - 0.120 ug/L may be associated with risks of adverse   clinical events.     UA reflex to Microscopic and Culture   Result Value Ref Range    Color Urine Light Yellow     Appearance Urine Clear     Glucose Urine >1000 (A) NEG mg/dL    Bilirubin Urine Negative NEG    Ketones Urine Negative NEG mg/dL    Specific Gravity Urine 1.031 1.003 - 1.035    Blood Urine Negative NEG    pH Urine 5.0 4.7 - 8.0 pH    Protein Albumin Urine Negative NEG mg/dL    Urobilinogen mg/dL Normal 0.0 - 2.0 mg/dL    Nitrite Urine Negative NEG    Leukocyte Esterase Urine Negative NEG    Source Midstream Urine    Troponin I   Result Value Ref Range    Troponin I ES  0.000 - 0.045 ug/L     <0.015  The 99th percentile for upper reference range is 0.045 ug/L.  Troponin values in   the range of 0.045 - 0.120 ug/L may be associated with risks of adverse   clinical events.          Critical Care time:  none               Labs Ordered and Resulted from Time of ED Arrival Up to the Time of Departure from the ED   COMPREHENSIVE METABOLIC PANEL - Abnormal; Notable for the following:        Result Value    Glucose 284 (*)     All other components within normal limits   UA MACROSCOPIC WITH REFLEX TO MICRO AND CULTURE - Abnormal; Notable for the following:     Glucose Urine >1000 (*)     All other components within normal limits   CBC WITH PLATELETS DIFFERENTIAL   TROPONIN I   TROPONIN I   PERIPHERAL IV CATHETER       Assessments & Plan (with Medical Decision Making)   Mr. Watt was observed over a protracted period and did well.  Troponin x 2 due to EKG findings.  He returned to baseline and requested discharge home.  This is certainly reasonable.  Discussed starting ASA.  Seems most consistent with acute vertigo  when taking the drink of soda, but he is a high risk patient.  Neuro exam entirely normal.  Needs close follow-up in the clinic.  Stressed returning HERE for ANY return of symptoms,or other concerns whatsoever.  Mr. Watt voiced complete understanding and was happy and agreeable.     I have reviewed the nursing notes.    I have reviewed the findings, diagnosis, plan and need for follow up with the patient.       Discharge Medication List as of 6/26/2017  9:31 PM      START taking these medications    Details   aspirin 81 MG tablet Take 1 tablet (81 mg) by mouth daily, R-OTC, OTC             Final diagnoses:   Dizziness       6/26/2017   [facility]     Arianna Rosas PA-C  06/26/17 7196

## 2017-07-06 ENCOUNTER — HOSPITAL ENCOUNTER (EMERGENCY)
Facility: HOSPITAL | Age: 67
Discharge: HOME | End: 2017-07-06
Attending: NURSE PRACTITIONER | Admitting: NURSE PRACTITIONER
Payer: MEDICARE

## 2017-07-06 VITALS
SYSTOLIC BLOOD PRESSURE: 146 MMHG | DIASTOLIC BLOOD PRESSURE: 86 MMHG | OXYGEN SATURATION: 94 % | TEMPERATURE: 98.5 F | RESPIRATION RATE: 16 BRPM

## 2017-07-06 DIAGNOSIS — K08.89 PAIN, DENTAL: ICD-10-CM

## 2017-07-06 PROCEDURE — 99213 OFFICE O/P EST LOW 20 MIN: CPT | Performed by: NURSE PRACTITIONER

## 2017-07-06 PROCEDURE — 99213 OFFICE O/P EST LOW 20 MIN: CPT

## 2017-07-06 RX ORDER — AMOXICILLIN 500 MG/1
1000 CAPSULE ORAL 2 TIMES DAILY
Qty: 40 CAPSULE | Refills: 0 | Status: SHIPPED | OUTPATIENT
Start: 2017-07-06 | End: 2017-07-16

## 2017-07-06 RX ORDER — IBUPROFEN 800 MG/1
800 TABLET, FILM COATED ORAL EVERY 8 HOURS PRN
Qty: 60 TABLET | Refills: 0 | Status: SHIPPED | OUTPATIENT
Start: 2017-07-06 | End: 2017-07-14

## 2017-07-06 NOTE — ED AVS SNAPSHOT
[facility]    [address]    [place] [address]    Phone:  [phone]                                       Donis Watt   MRN: [id number]    Department:  [facility]   Date of Visit:  7/6/2017           After Visit Summary Signature Page     I have received my discharge instructions, and my questions have been answered. I have discussed any challenges I see with this plan with the nurse or doctor.    ..........................................................................................................................................  Patient/Patient Representative Signature      ..........................................................................................................................................  Patient Representative Print Name and Relationship to Patient    ..................................................               ................................................  Date                                            Time    ..........................................................................................................................................  Reviewed by Signature/Title    ...................................................              ..............................................  Date                                                            Time

## 2017-07-06 NOTE — ED AVS SNAPSHOT
[facility]    [address] [address]    Phone:  [phone]                                       Donis Watt   MRN: [id number]    Department:  [facility]   Date of Visit:  7/6/2017           Patient Information     Date Of Birth          1950        Your diagnoses for this visit were:     Pain, dental        You were seen by Lucy Turner NP.      Follow-up Information     Follow up with Edinson London DO.    Specialty:  Family Practice    Why:  As needed, If symptoms worsen    Contact information:    [facility]  [address]  [facility] [address] [phone]          Follow up with [facility].    Specialty:  EMERGENCY MEDICINE    Why:  As needed, If symptoms worsen    Contact information:    [address] [address] [phone]    Additional information:    From [facility] ([place], [place]): Take [address]. Turn left at [address]. Turn left at the first stoplight on [address]. At the first stop sign, take a right onto [address]. Take a left into the parking lot and continue through until you reach the North enterance of the building.       From [place]: Take [address]. Take the [address] ramp towards [place]. Turn left onto [address]. Take a slight right onto [address]. Turn left at the first stoplight on [address]. At the first stop sign, take a right onto [address]. Take a left into the parking lot and continue through until you reach the North enterance of the building.       From [place]: Take [address]. Take a right at [address]. At the first stop sign, take a right onto [address]. Take a left into the parking lot and continue through until you reach the North enterance of the building.       Discharge References/Attachments     DENTAL PAIN (ENGLISH)         Review of your medicines      START taking         Dose / Directions Last dose taken    amoxicillin 500 MG capsule   Commonly known as:  AMOXIL   Dose:  1000 mg   Quantity:  40 capsule        Take 2 capsules (1,000 mg) by mouth 2 times daily for 10 days   Refills:  0        ibuprofen 800 MG tablet   Commonly known as:  ADVIL/MOTRIN   Dose:  800 mg   Quantity:  60 tablet        Take 1 tablet (800 mg) by mouth every 8 hours as needed for moderate pain   Refills:  0          Our records show that you are taking the medicines listed below. If these are incorrect, please call your family doctor or clinic.        Dose / Directions Last dose taken    aspirin 81 MG tablet   Dose:  81 mg        Take 1 tablet (81 mg) by mouth daily   Refills:  OTC        canaliflozin tablet   Commonly known as:  INVOKANA        Take by mouth every morning (before breakfast)   Refills:  0        CRESTOR PO   Dose:  10 mg        Take 10 mg by mouth   Refills:  0        FLOMAX 0.4 MG capsule   Dose:  0.4 mg   Generic drug:  tamsulosin        Take 0.4 mg by mouth daily   Refills:  0        LASIX PO        Take by mouth daily   Refills:  0        PANTOPRAZOLE SODIUM PO   Dose:  40 mg        Take 40 mg by mouth every morning (before breakfast)   Refills:  0        RAMIPRIL PO   Dose:  7.5 mg        Take 7.5 mg by mouth daily   Refills:  0        VITAMIN D (CHOLECALCIFEROL) PO   Dose:  2000 Units        Take 2,000 Units by mouth 2 times daily   Refills:  0                Prescriptions were sent or printed at these locations (2 Prescriptions)                   [facility] [place] - [address] AT [facility] [address]   [address], [place] [address]    Telephone:  [phone]   Fax:  [phone]   Hours:                  E-Prescribed (2 of 2)         amoxicillin (AMOXIL) 500 MG capsule               ibuprofen (ADVIL/MOTRIN) 800 MG tablet                Orders Needing Specimen Collection     None      Pending Results     No orders found from 7/4/2017 to 7/7/2017.     "        Pending Culture Results     No orders found from 2017 to 2017.            Thank you for choosing [facility]       Thank you for choosing [facility] for your care. Our goal is always to provide you with excellent care. Hearing back from our patients is one way we can continue to improve our services. Please take a few minutes to complete the written survey that you may receive in the mail after you visit with us. Thank you!        [facility] Information     [facility] lets you send messages to your doctor, view your test results, renew your prescriptions, schedule appointments and more. To sign up, go to [email or web address] . Click on \"Log in\" on the left side of the screen, which will take you to the Welcome page. Then click on \"Sign up Now\" on the right side of the page.     You will be asked to enter the access code listed below, as well as some personal information. Please follow the directions to create your username and password.     Your access code is: [email or web address]  Expires: 2017  9:30 PM     Your access code will  in 90 days. If you need help or a new code, please call your [facility] clinic or [phone].        Care EveryWhere ID     This is your Care EveryWhere ID. This could be used by other organizations to access your [facility] medical records  [id number]        Equal Access to Services     EDGARDO PALOMO AH: Hadthania Turcios, waaxda luqadaha, qaybta kaalmada adejosue, luanne kasper . So [facility] [phone].    ATENCIÓN: Si habla español, tiene a hernandez disposición servicios gratuitos de asistencia lingüística. Llame al [phone].    We comply with applicable federal civil rights laws and [place] laws. We do not discriminate on the basis of race, color, national origin, age, disability sex, sexual orientation or gender identity.            After Visit Summary       This is your record. Keep this with you and show to your community pharmacist(s) " and doctor(s) at your next visit.

## 2017-07-07 NOTE — ED NOTES
Ambulated to 5 with c/o right sided lower dental pain, states that he lost the filling in the tooth and he is unable to get into the dentist.

## 2017-07-07 NOTE — ED PROVIDER NOTES
History     Chief Complaint   Patient presents with     Dental Pain     R sided, started yesterday     The history is provided by the patient. No  was used.     Donis Watt is a 67 year old male who has had a toothache right lower molar has tried Anbesol, excedrin, advil ,  He last took medication about 1600. He lost a cavity 6-8 months ago.  Can't get into his dentist until 7/10/2017.  He actually drove into the dentist office today as he was unable to get a hold of them.  They are not open again until Monday.  He does see a local dentist.  He is concerned he may need an antibiotic.  Ramu mcmullen had fever or other signs of systemic infection    I have reviewed the Medications, Allergies, Past Medical and Surgical History, and Social History in the Epic system.  PCP sees Dr London        Review of Systems   Constitutional: Negative.  Negative for activity change, appetite change, chills and fever.   HENT: Positive for dental problem. Negative for sore throat and trouble swallowing.    Eyes: Negative.    Respiratory: Negative.    Cardiovascular: Negative.    Gastrointestinal: Negative.  Negative for diarrhea, nausea and vomiting.   Genitourinary: Negative.    Musculoskeletal: Negative.    Skin: Negative.    Neurological: Negative.    Hematological: Negative.        Physical Exam   BP: 146/86  Heart Rate: 78  Temp: 98.5  F (36.9  C)  Resp: 16  SpO2: 94 %  Physical Exam   Constitutional: He is oriented to person, place, and time. He appears well-developed and well-nourished. No distress.   HENT:   Head: Normocephalic and atraumatic.   Right Ear: External ear normal.   Left Ear: External ear normal.   Nose: Nose normal.   Mouth/Throat: Oropharynx is clear and moist. No oropharyngeal exudate.   He has a missing filling from his last molar on the right lower, there are no obvious signs of infection along the gum.  It looks minimally irritated.  His teeth are in poor repair t/o   Eyes: Conjunctivae  and EOM are normal. Pupils are equal, round, and reactive to light.   Neck: Normal range of motion. Neck supple.   Cardiovascular: Normal rate.    Pulmonary/Chest: Effort normal.   Abdominal: Soft.   Musculoskeletal: Normal range of motion.   Neurological: He is alert and oriented to person, place, and time.   Skin: Skin is warm and dry. He is not diaphoretic.   Nursing note and vitals reviewed.      ED Course     ED Course     Procedures               Labs Ordered and Resulted from Time of ED Arrival Up to the Time of Departure from the ED - No data to display    Assessments & Plan (with Medical Decision Making)     I have reviewed the nursing notes.  May or may not have infection.  Needs dentist asap for the pain.  Was provided with antbx and rx ibuprofen.  May also use tylenol up to 4000 mg daily. ICE or heat may help.  Declined the temporary filling paste we have here.     Pathophysiology, possible etiology and treatment with potential outcomes, risks, benefits, and alternatives discussed to the best of my ability        I have reviewed the findings, diagnosis, plan and need for follow up with the patient.      Discharge Medication List as of 7/6/2017  8:43 PM      START taking these medications    Details   amoxicillin (AMOXIL) 500 MG capsule Take 2 capsules (1,000 mg) by mouth 2 times daily for 10 days, Disp-40 capsule, R-0, E-Prescribe      ibuprofen (ADVIL/MOTRIN) 800 MG tablet Take 1 tablet (800 mg) by mouth every 8 hours as needed for moderate pain, Disp-60 tablet, R-0, E-Prescribe             Final diagnoses:   Pain, dental   Pt verbalizes understanding and agreement with plan.  Follow up for worsening symptoms      7/6/2017   [facility]     Lucy Turner NP  07/08/17 [id number]

## 2017-07-08 ASSESSMENT — ENCOUNTER SYMPTOMS
ACTIVITY CHANGE: 0
EYES NEGATIVE: 1
CONSTITUTIONAL NEGATIVE: 1
SORE THROAT: 0
RESPIRATORY NEGATIVE: 1
HEMATOLOGIC/LYMPHATIC NEGATIVE: 1
CARDIOVASCULAR NEGATIVE: 1
APPETITE CHANGE: 0
NAUSEA: 0
GASTROINTESTINAL NEGATIVE: 1
NEUROLOGICAL NEGATIVE: 1
DIARRHEA: 0
CHILLS: 0
FEVER: 0
TROUBLE SWALLOWING: 0
MUSCULOSKELETAL NEGATIVE: 1
VOMITING: 0

## 2017-07-13 ENCOUNTER — MEDICAL CORRESPONDENCE (OUTPATIENT)
Dept: HEALTH INFORMATION MANAGEMENT | Facility: HOSPITAL | Age: 67
End: 2017-07-13

## 2017-07-20 ENCOUNTER — TELEPHONE (OUTPATIENT)
Dept: WOUND CARE | Facility: OTHER | Age: 67
End: 2017-07-20

## 2017-07-20 DIAGNOSIS — E11.65 TYPE 2 DIABETES MELLITUS WITH HYPERGLYCEMIA, WITHOUT LONG-TERM CURRENT USE OF INSULIN (H): Primary | ICD-10-CM

## 2017-11-09 ENCOUNTER — HOSPITAL ENCOUNTER (EMERGENCY)
Facility: HOSPITAL | Age: 67
Discharge: HOME | End: 2017-11-09
Attending: PHYSICIAN ASSISTANT | Admitting: PHYSICIAN ASSISTANT
Payer: MEDICARE

## 2017-11-09 VITALS
HEART RATE: 103 BPM | HEIGHT: 70 IN | OXYGEN SATURATION: 96 % | RESPIRATION RATE: 20 BRPM | DIASTOLIC BLOOD PRESSURE: 96 MMHG | TEMPERATURE: 100.4 F | SYSTOLIC BLOOD PRESSURE: 174 MMHG

## 2017-11-09 DIAGNOSIS — N39.0 URINARY TRACT INFECTION WITHOUT HEMATURIA, SITE UNSPECIFIED: ICD-10-CM

## 2017-11-09 LAB
ALBUMIN UR-MCNC: NEGATIVE MG/DL
APPEARANCE UR: CLEAR
BACTERIA #/AREA URNS HPF: ABNORMAL /HPF
BILIRUB UR QL STRIP: NEGATIVE
COLOR UR AUTO: ABNORMAL
GLUCOSE UR STRIP-MCNC: >1000 MG/DL
HGB UR QL STRIP: NEGATIVE
KETONES UR STRIP-MCNC: NEGATIVE MG/DL
LEUKOCYTE ESTERASE UR QL STRIP: ABNORMAL
MUCOUS THREADS #/AREA URNS LPF: PRESENT /LPF
NITRATE UR QL: NEGATIVE
PH UR STRIP: 6.5 PH (ref 4.7–8)
RBC #/AREA URNS AUTO: 1 /HPF (ref 0–2)
SOURCE: ABNORMAL
SP GR UR STRIP: 1.01 (ref 1–1.03)
UROBILINOGEN UR STRIP-MCNC: NORMAL MG/DL (ref 0–2)
WBC #/AREA URNS AUTO: 53 /HPF (ref 0–2)

## 2017-11-09 PROCEDURE — 87086 URINE CULTURE/COLONY COUNT: CPT | Performed by: PHYSICIAN ASSISTANT

## 2017-11-09 PROCEDURE — 99213 OFFICE O/P EST LOW 20 MIN: CPT

## 2017-11-09 PROCEDURE — 99213 OFFICE O/P EST LOW 20 MIN: CPT | Performed by: PHYSICIAN ASSISTANT

## 2017-11-09 PROCEDURE — 81001 URINALYSIS AUTO W/SCOPE: CPT | Performed by: PHYSICIAN ASSISTANT

## 2017-11-09 RX ORDER — SULFAMETHOXAZOLE/TRIMETHOPRIM 800-160 MG
1 TABLET ORAL 2 TIMES DAILY
Qty: 20 TABLET | Refills: 0 | Status: SHIPPED | OUTPATIENT
Start: 2017-11-09 | End: 2017-11-19

## 2017-11-09 RX ORDER — PHENAZOPYRIDINE HYDROCHLORIDE 200 MG/1
200 TABLET, FILM COATED ORAL 3 TIMES DAILY
Qty: 9 TABLET | Refills: 0 | Status: SHIPPED | OUTPATIENT
Start: 2017-11-09 | End: 2017-11-12

## 2017-11-09 ASSESSMENT — ENCOUNTER SYMPTOMS
FATIGUE: 0
ABDOMINAL PAIN: 0
FLANK PAIN: 0
NAUSEA: 0
FREQUENCY: 1
PSYCHIATRIC NEGATIVE: 1
CARDIOVASCULAR NEGATIVE: 1
DYSURIA: 1
NEUROLOGICAL NEGATIVE: 1
HEMATURIA: 0
DIFFICULTY URINATING: 0
CHILLS: 0
FEVER: 0
BACK PAIN: 0

## 2017-11-09 NOTE — ED AVS SNAPSHOT
[facility]    [address]    [place] [address]    Phone:  [phone]                                       Donis Watt   MRN: [id number]    Department:  [facility]   Date of Visit:  11/9/2017           After Visit Summary Signature Page     I have received my discharge instructions, and my questions have been answered. I have discussed any challenges I see with this plan with the nurse or doctor.    ..........................................................................................................................................  Patient/Patient Representative Signature      ..........................................................................................................................................  Patient Representative Print Name and Relationship to Patient    ..................................................               ................................................  Date                                            Time    ..........................................................................................................................................  Reviewed by Signature/Title    ...................................................              ..............................................  Date                                                            Time

## 2017-11-09 NOTE — ED AVS SNAPSHOT
[facility]    [address] [address]    Phone:  [phone]                                       Donis Watt   MRN: [id number]    Department:  [facility]   Date of Visit:  11/9/2017           Patient Information     Date Of Birth          1950        Your diagnoses for this visit were:     Urinary tract infection without hematuria, site unspecified        You were seen by Ryan Ulloa PA.      Follow-up Information     Follow up with Edinson London DO In 3 days.    Specialty:  Family Practice    Why:  As needed    Contact information:    [facility]  [address] [facility] [address] [phone]          Follow up with [facility].    Specialty:  EMERGENCY MEDICINE    Why:  If symptoms worsen    Contact information:    [address] [address] [phone]    Additional information:    From [place]: Take [address]. Turn left at [address]. Turn left at the first stoplight on [address] [place] [address]. At the first stop sign, take a right onto [address]. Take a left into the parking lot and continue through until you reach the North enterance of the building.       From [place]: Take [address]. Take the [address] ramp towards [place]. Turn left onto [address]. Take a slight right onto [address] [facility]. Turn left at the first stoplight on [address]. At the first stop sign, take a right onto [address]. Take a left into the parking lot and continue through until you reach the North enterance of the building.       From [place]: Take [address]. Take a right at [address]. At the first stop sign, take a right onto [address]. Take a left into the parking lot and continue through until you reach the North enterance of the building.         Discharge Instructions       - Pyridium for discomfort and urgency  - Bactrim to cover  infection  - Back here with any worsening, belly pain, flank pain    * We will call you if we need to change medication based on the culture.     Discharge References/Attachments     BLADDER INFECTION, MALE (ADULT) (ENGLISH)         Review of your medicines      START taking        Dose / Directions Last dose taken    phenazopyridine 200 MG tablet   Commonly known as:  PYRIDIUM   Dose:  200 mg   Quantity:  9 tablet        Take 1 tablet (200 mg) by mouth 3 times daily for 3 days   Refills:  0        sulfamethoxazole-trimethoprim 800-160 MG per tablet   Commonly known as:  BACTRIM DS   Dose:  1 tablet   Quantity:  20 tablet        Take 1 tablet by mouth 2 times daily for 10 days   Refills:  0          Our records show that you are taking the medicines listed below. If these are incorrect, please call your family doctor or clinic.        Dose / Directions Last dose taken    aspirin 81 MG tablet   Dose:  81 mg        Take 1 tablet (81 mg) by mouth daily   Refills:  OTC        CRESTOR PO   Dose:  10 mg        Take 10 mg by mouth   Refills:  0        FLOMAX 0.4 MG capsule   Dose:  0.4 mg   Generic drug:  tamsulosin        Take 0.4 mg by mouth daily   Refills:  0        PANTOPRAZOLE SODIUM PO   Dose:  40 mg        Take 40 mg by mouth every morning (before breakfast)   Refills:  0        RAMIPRIL PO   Dose:  7.5 mg        Take 7.5 mg by mouth daily   Refills:  0        TRULICITY SC        Inject Subcutaneous once a week   Refills:  0        VITAMIN D (CHOLECALCIFEROL) PO   Dose:  2000 Units        Take 2,000 Units by mouth 2 times daily   Refills:  0                Prescriptions were sent or printed at these locations (2 Prescriptions)                   [id number] - [place] - [address] AT [facility] [address] [place] [address]    Telephone:  [phone]   Fax:  [phone]   Hours:                  E-Prescribed (2 of 2)         sulfamethoxazole-trimethoprim (BACTRIM DS)  "800-160 MG per tablet               phenazopyridine (PYRIDIUM) 200 MG tablet                Procedures and tests performed during your visit     UA with Microscopic reflex to Culture    Urine Culture Aerobic Bacterial      Orders Needing Specimen Collection     None      Pending Results     Date and Time Order Name Status Description    2017 191 Urine Culture Aerobic Bacterial In process             Pending Culture Results     Date and Time Order Name Status Description    2017 191 Urine Culture Aerobic Bacterial In process             Thank you for choosing [facility]       Thank you for choosing [facility] for your care. Our goal is always to provide you with excellent care. Hearing back from our patients is one way we can continue to improve our services. Please take a few minutes to complete the written survey that you may receive in the mail after you visit with us. Thank you!        [employer] Information     [employer] lets you send messages to your doctor, view your test results, renew your prescriptions, schedule appointments and more. To sign up, go to [email or web address] . Click on \"Log in\" on the left side of the screen, which will take you to the Welcome page. Then click on \"Sign up Now\" on the right side of the page.     You will be asked to enter the access code listed below, as well as some personal information. Please follow the directions to create your username and password.     Your access code is: [email or web address]  Expires: 2018  7:31 PM     Your access code will  in 90 days. If you need help or a new code, please call your [facility] clinic or [phone].        Care EveryWhere ID     This is your Care EveryWhere ID. This could be used by other organizations to access your [facility] medical records  [id number]        Equal Access to Services     [facility] DEWEY : Hadii kenisha Turcios, madison rucker, luanne garcia. So " [facility] [phone].    ATENCIÓN: Si habla español, tiene a hernandez disposición servicios gratuitos de asistencia lingüística. Llame al [phone].    We comply with applicable federal civil rights laws and [place] laws. We do not discriminate on the basis of race, color, national origin, age, disability, sex, sexual orientation, or gender identity.            After Visit Summary       This is your record. Keep this with you and show to your community pharmacist(s) and doctor(s) at your next visit.

## 2017-11-10 NOTE — ED PROVIDER NOTES
"  History     Chief Complaint   Patient presents with     Urinary Frequency     With urgency X 3 hours, patient concerned for UTI.     The history is provided by the patient. No  was used.     Donis Watt is a 67 year old male with Hx DM, prostatitis who presents with intense urinary urgency and frequency starting this evening. He reports having gone 6x in under 2 hrs. Minimal burning. No abdominal pain. No Hx kidney stones. No fevers, but 100.4 in triage.    Problem List:    There are no active problems to display for this patient.       Past Medical History:    No past medical history on file.    Past Surgical History:    No past surgical history on file.    Family History:    No family history on file.    Social History:  Marital Status:   [2]  Social History   Substance Use Topics     Smoking status: Former Smoker     Smokeless tobacco: Never Used     Alcohol use Yes      Comment: social        Medications:      Dulaglutide (TRULICITY SC)   sulfamethoxazole-trimethoprim (BACTRIM DS) 800-160 MG per tablet   phenazopyridine (PYRIDIUM) 200 MG tablet   aspirin 81 MG tablet   VITAMIN D, CHOLECALCIFEROL, PO   Rosuvastatin Calcium (CRESTOR PO)   tamsulosin (FLOMAX) 0.4 MG 24 hr capsule   PANTOPRAZOLE SODIUM PO   RAMIPRIL PO         Review of Systems   Constitutional: Negative for chills, fatigue and fever.   Cardiovascular: Negative.    Gastrointestinal: Negative for abdominal pain and nausea.   Genitourinary: Positive for dysuria, frequency and urgency. Negative for difficulty urinating, flank pain and hematuria.   Musculoskeletal: Negative for back pain.   Skin: Negative.    Neurological: Negative.    Psychiatric/Behavioral: Negative.        Physical Exam   BP: 174/96  Pulse: 103  Temp: 100.4  F (38  C)  Resp: 20  Height: 177.8 cm (5' 10\")  SpO2: 96 %      Physical Exam   Constitutional: He is oriented to person, place, and time. He appears well-developed and well-nourished. No " distress.   Cardiovascular: Normal rate, regular rhythm and normal heart sounds.    Pulmonary/Chest: Effort normal and breath sounds normal.   Abdominal: Soft. Bowel sounds are normal. There is no tenderness (NO CVA).   Neurological: He is alert and oriented to person, place, and time.   Skin: Skin is warm and dry.   Psychiatric: He has a normal mood and affect.   Nursing note and vitals reviewed.      ED Course     ED Course     Procedures    Labs Ordered and Resulted from Time of ED Arrival Up to the Time of Departure from the ED   ROUTINE UA WITH MICROSCOPIC REFLEX TO CULTURE - Abnormal; Notable for the following:        Result Value    Glucose Urine >1000 (*)     Leukocyte Esterase Urine Moderate (*)     WBC Urine 53 (*)     Bacteria Urine None (*)     Mucous Urine Present (*)     All other components within normal limits   URINE CULTURE AEROBIC BACTERIAL       Assessments & Plan (with Medical Decision Making)     I have reviewed the nursing notes.  I have reviewed the findings, diagnosis, plan and need for follow up with the patient.    Discharge Medication List as of 11/9/2017  7:32 PM      START taking these medications    Details   sulfamethoxazole-trimethoprim (BACTRIM DS) 800-160 MG per tablet Take 1 tablet by mouth 2 times daily for 10 days, Disp-20 tablet, R-0, E-Prescribe      phenazopyridine (PYRIDIUM) 200 MG tablet Take 1 tablet (200 mg) by mouth 3 times daily for 3 days, Disp-9 tablet, R-0, E-Prescribe             Final diagnoses:   Urinary tract infection without hematuria, site unspecified   Will treat as above. Will recheck here over the weekend if no response to antibiotic. Will contact patient with any changes in plan based on culture. Patient agreeable to plan, verbally educated and given appropriate education sheets for each of the diagnoses and has no questions.    Ryan Ulloa PA-C   11/9/2017   7:55 PM    11/9/2017   [facility]     Ryan Ulloa PA  11/09/17 1955

## 2017-11-10 NOTE — DISCHARGE INSTRUCTIONS
- Pyridium for discomfort and urgency  - Bactrim to cover infection  - Back here with any worsening, belly pain, flank pain    * We will call you if we need to change medication based on the culture.

## 2017-11-11 LAB
BACTERIA SPEC CULT: ABNORMAL
SPECIMEN SOURCE: ABNORMAL

## 2018-02-27 ENCOUNTER — TRANSFERRED RECORDS (OUTPATIENT)
Dept: HEALTH INFORMATION MANAGEMENT | Facility: CLINIC | Age: 68
End: 2018-02-27

## 2018-09-17 ENCOUNTER — TRANSFERRED RECORDS (OUTPATIENT)
Dept: HEALTH INFORMATION MANAGEMENT | Facility: CLINIC | Age: 68
End: 2018-09-17

## 2018-09-17 LAB
ALT SERPL-CCNC: 43 U/L (ref 18–65)
AST SERPL-CCNC: 25 U/L (ref 10–40)
CHOLEST SERPL-MCNC: 163 MG/DL
CREAT SERPL-MCNC: 0.86 MG/DL (ref 0.8–1.5)
GFR SERPL CREATININE-BSD FRML MDRD: >60 ML/MIN/1.73M2
GLUCOSE SERPL-MCNC: 196 MG/DL (ref 60–99)
HBA1C MFR BLD: 9.9 % (ref 4–6)
HDLC SERPL-MCNC: 32 MG/DL
LDLC SERPL CALC-MCNC: 104 MG/DL
POTASSIUM SERPL-SCNC: 4 MEQ/L (ref 3.5–5.1)
TRIGL SERPL-MCNC: 137 MG/DL

## 2018-11-30 ENCOUNTER — HOSPITAL ENCOUNTER (OUTPATIENT)
Dept: EDUCATION SERVICES | Facility: HOSPITAL | Age: 68
Discharge: HOME | End: 2018-11-30
Attending: NURSE PRACTITIONER | Admitting: FAMILY MEDICINE
Payer: MEDICARE

## 2018-11-30 VITALS
OXYGEN SATURATION: 95 % | BODY MASS INDEX: 39.1 KG/M2 | DIASTOLIC BLOOD PRESSURE: 88 MMHG | HEART RATE: 77 BPM | HEIGHT: 70 IN | SYSTOLIC BLOOD PRESSURE: 135 MMHG | WEIGHT: 273.1 LBS

## 2018-11-30 DIAGNOSIS — E11.65 TYPE 2 DIABETES MELLITUS WITH HYPERGLYCEMIA, WITHOUT LONG-TERM CURRENT USE OF INSULIN (H): Primary | ICD-10-CM

## 2018-11-30 PROCEDURE — G0108 DIAB MANAGE TRN  PER INDIV: HCPCS | Performed by: DIETITIAN, REGISTERED

## 2018-11-30 ASSESSMENT — PAIN SCALES - GENERAL: PAINLEVEL: NO PAIN (0)

## 2018-11-30 NOTE — LETTER
"    11/30/2018        RE: Donis Watt  [address]  [facility] [address]        Diabetes Self-Management Education & Support    Diabetes Education Self Management & Training    SUBJECTIVE/OBJECTIVE:  Presents for: Individual review  Accompanied by:  González)  Diabetes education in the past 24mo: No  Focus of Visit: Reducing Risks  Diabetes type: Type 2  Date of diagnosis: 2008  Disease course: Worsening  How confident are you filling out medical forms by yourself:: Somewhat  Diabetes management related comments/concerns: Lowering glucose levels, lose weight.  Transportation concerns: No  Other concerns:: None  Cultural Influences/Ethnic Background:  American    Diabetes Symptoms & Complications  Blurred vision: Yes  Fatigue: No  Neuropathy: No  Foot ulcerations: No  Polydipsia: No  Polyphagia: No  Polyuria: No  Visual change: Yes  Weakness: No  Weight loss: No  Slow healing wounds: No  Recent Infection(s): No  Symptom course: Stable  Weight trend: Stable  Autonomic neuropathy: No  CVA: No  Heart disease: No  Nephropathy: No  Peripheral neuropathy: No  Peripheral Vascular Disease: No  Retinopathy: No    Patient Problem List and Family Medical History reviewed for relevant medical history, current medical status, and diabetes risk factors.    Vitals:  /88  Pulse 77  Ht 1.772 m (5' 9.75\")  Wt 123.9 kg (273 lb 1.6 oz)  SpO2 95%  BMI 39.47 kg/m2  Estimated body mass index is 39.47 kg/(m^2) as calculated from the following:    Height as of this encounter: 1.772 m (5' 9.75\").    Weight as of this encounter: 123.9 kg (273 lb 1.6 oz).   Last 3 BP:   BP Readings from Last 3 Encounters:   11/30/18 135/88   11/09/17 174/96   07/06/17 146/86       History   Smoking Status     Former Smoker   Smokeless Tobacco     Never Used       Labs:  Lab Results   Component Value Date    A1C 9.8 09/17/2018     Lab Results   Component Value Date     09/17/2018     Lab Results   Component Value Date     09/17/2018 "     HDL Cholesterol   Date Value Ref Range Status   2018 32 (L) >=60 mg/dL Final   ]  GFR Estimate   Date Value Ref Range Status   2017 89 >60 mL/min/1.7m2 Final     Comment:     Non  GFR Calc     GFR Estimate If Black   Date Value Ref Range Status   2017 >90   GFR Calc   >60 mL/min/1.7m2 Final     Lab Results   Component Value Date    CR 0.86 2017     No results found for: MICROALBUMIN    Healthy Eating  Healthy Eating Assessed Today: Yes  Cultural/Congregation diet restrictions?: No  Patient on a regular basis: Eats 3 meals a day, Eats out more than once a week  Meal planning: Avoiding sweets  Meals include: Breakfast, Lunch, Dinner, Snacks  Breakfast: bagel and hard boiled egg  Lunch: salami sandwich  Dinner: 2 grilled chesse sandwiches or pasta  Snacks:  sugar free candy, nuts, chips  Beverages: Water, Milk (Crystal Lite)  Has patient met with a dietitian in the past?: Yes    Being Active  Being Active Assessed Today: Yes (started using treadmill)  Exercise:: Yes  Days per week of moderate to strenuous exercise (like a brisk walk): 7  On average, minutes per day of exercise at this level: 30  How intense was your typical exercise? : Light (like stretching or slow walking)  Exercise Minutes per Week: 210  Barrier to exercise: Physical limitation (has some hip issues)    Monitoring  Monitoring Assessed Today: Yes  Did patient bring glucose meter to appointment? : Yes (strips are )  Blood Glucose Meter: Other (GlucEpulsard Vital)  Home Glucose (Sugar) Monitoring: Other (Pt testing very sporadically.  Strips . )  New meter provided today.     Taking Medications  Diabetes Medication(s)     Incretin Mimetic Agents (GLP-1 Receptor Agonists) Sig    Dulaglutide (TRULICITY SC) Inject Subcutaneous once a week        Taking Medication Assessed Today: Yes  Current Treatments: Non-insulin Injectables (Trulicity 0.75 mg weekly. Stopped Invokana r/t excessive  urination. )  Problems taking diabetes medications regularly?: Yes  Diabetes medication side effects?: No  Treatment Compliance: All of the time    Problem Solving  Problem Solving Assessed Today: Yes  Hypoglycemia Frequency: Never     Reducing Risks  Diabetes Risks: Age over 45 years, Sedentary Lifestyle, Family History  CAD Risks: Diabetes Mellitus, Male sex, Obesity, Family history, Sedentary lifestyle, Stress  Has dilated eye exam at least once a year?: Yes  Sees dentist every 6 months?: Yes  Sees podiatrist (foot doctor)?: No    Healthy Coping  Healthy Coping Assessed Today: Yes  Emotional response to diabetes: Ready to learn  Informal Support system:: Spouse  Stage of change: PREPARATION (Decided to change - considering how)  Difficulty affording diabetes management supplies?: No  Support resources: None  Patient Activation Measure Survey Score:  No flowsheet data found.    ASSESSMENT:  Pt was diagnosed with diabetes about 10 years ago.  He had education at that time and initially did well but has had no education since.  He reports much stress in his life in the past year and thinks that may play a role in his elevated A1c.  His wife is present with him and she appears supportive.  They would like a review of meal planning today.  Pt does not want any medication adjustments until after next A1c check - I suspect he will need some.     Patient's most recent   Lab Results   Component Value Date    A1C 9.8 09/17/2018    is not meeting goal of <7.0    INTERVENTION:   Diabetes knowledge and skills assessment:     Patient is knowledgeable in diabetes management concepts related to: needs review in all areas.     Patient needs further education on the following diabetes management concepts: Healthy Eating and Being Active, monitoring, taking medications, risk reduction, healthy coping.     Based on learning assessment above, most appropriate setting for further diabetes education would be: Individual  setting.    Education provided today on:  AADE Self-Care Behaviors:  Healthy Eating: carbohydrate counting, consistency in amount, composition, and timing of food intake, weight reduction, portion control and label reading  Being Active: relationship to blood glucose and benefits in promoting weight loss.   Monitoring: purpose, proper technique, individual blood glucose targets, frequency of monitoring and proper sharps disposal  Reducing Risks: major complications of diabetes and A1C - goals, relating to blood glucose levels, how often to check  Patient was instructed on One Touch Verio meter and was able to provide an accurate return demonstration. Patient's blood glucose reading today was 169 mg/dL fasting. 2    Opportunities for ongoing education and support in diabetes-self management were discussed.    Pt verbalized understanding of concepts discussed and recommendations provided today.       Education Materials Provided:  Carbohydrate counting meal plan, food logs.     PLAN:  Follow carbohydrate counting meal plan - 60 grams/meal, 15-30 grams/snack.  Keep food logs to help you stay on track.   Continue to walk on treadmill daily with goal of at least 30 minutes daily.   Test glucose 2x/day - fasting and 2 hours after supper.   See Patient Instructions for co-developed, patient-stated behavior change goals.  AVS printed and provided to patient today. See Follow-Up section for recommended follow-up.    Time Spent: 75 minutes  Encounter Type: Individual    Any diabetes medication dose changes were made via the CDE Protocol and Collaborative Practice Agreement with the patient's referring provider. A copy of this encounter was shared with the provider.        Sincerely,        Ria Morillo RD

## 2018-11-30 NOTE — IP AVS SNAPSHOT
MRN:[id number]                      After Visit Summary   11/30/2018    Donis Watt    MRN: [id number]           Thank you!     Thank you for choosing [facility] for your care. Our goal is always to provide you with excellent care. Hearing back from our patients is one way we can continue to improve our services. Please take a few minutes to complete the written survey that you may receive in the mail after you visit with us. Thank you!        Patient Information     Date Of Birth          1950        About your hospital stay     You were admitted on:  November 30, 2018 You last received care in the:  [facility]    You were discharged on:  November 30, 2018       Who to Call     For medical emergencies, please call 911.  For non-urgent questions about your medical care, please call your primary care provider or clinic, [phone]          Attending Provider     Provider Specialty    Ermelinda Menendez APRN CNP Family Practice       Primary Care Provider Office Phone # Fax #    Edinson LondonDO [phone]      Follow-up notes from your care team     Return in about 4 weeks (around 12/28/2018).      Care Instructions    -Follow carbohydrate counting meal plan - 60 grams/meal, 15-30 grams/snack.   -Keep food logs to help you stay on track.   -Continue to walk on treadmill - goal is at least 30 minutes most days of the week.   -Test your glucose 2x/day - fasting and 2 hours after supper.   -Target levels are fasting , 2 hours after supper less than 180.   -Bring your meter to follow up session.   -Follow up in January.   -Call with any concerns - PATRIZIA Short, [phone].          Pending Results     No orders found from 11/28/2018 to 12/1/2018.            Admission Information     Date & Time Provider Department Dept. Phone    11/30/2018 Ermelinda Menendez APRN CNP [facility] [phone]      Your Vitals Were     Blood  "Pressure Pulse Height Weight Pulse Oximetry BMI (Body Mass Index)    162/104 77 1.772 m (5' 9.75\") 123.9 kg (273 lb 1.6 oz) 95% 39.47 kg/m2      Care EveryWhere ID     This is your Care EveryWhere ID. This could be used by other organizations to access your [facility] medical records  [id number]        Equal Access to Services     [facility]: Hadii aad ku hadasho Soomaali, waaxda luqadaha, qaybta kaalmada adeegyada, waxay maudein hayaan adeguillermo owens lacourtneyn ah. So [facility] [phone].    ATENCIÓN: Si habla español, tiene a hernandez disposición servicios gratuitos de asistencia lingüística. Tom al [phone].    We comply with applicable federal civil rights laws and [place] laws. We do not discriminate on the basis of race, color, national origin, age, disability, sex, sexual orientation, or gender identity.               Review of your medicines      UNREVIEWED medicines. Ask your doctor about these medicines        Dose / Directions    aspirin 81 MG tablet   Commonly known as:  ASA        Dose:  81 mg   Take 1 tablet (81 mg) by mouth daily   Refills:  OTC       CRESTOR PO        Dose:  10 mg   Take 10 mg by mouth   Refills:  0       FLOMAX 0.4 MG capsule   Generic drug:  tamsulosin        Dose:  0.4 mg   Take 0.4 mg by mouth daily   Refills:  0       PANTOPRAZOLE SODIUM PO        Dose:  40 mg   Take 40 mg by mouth every morning (before breakfast)   Refills:  0       RAMIPRIL PO        Dose:  7.5 mg   Take 7.5 mg by mouth daily   Refills:  0       TRULICITY SC        Inject Subcutaneous once a week   Refills:  0       VITAMIN D (CHOLECALCIFEROL) PO        Dose:  2000 Units   Take 2,000 Units by mouth 2 times daily   Refills:  0                Protect others around you: Learn how to safely use, store and throw away your medicines at www.disposemymeds.org.             Medication List: This is a list of all your medications and when to take them. Check marks below indicate your daily home schedule. Keep this list as a " reference.      Medications           Morning Afternoon Evening Bedtime As Needed    aspirin 81 MG tablet   Commonly known as:  ASA   Take 1 tablet (81 mg) by mouth daily                                CRESTOR PO   Take 10 mg by mouth                                FLOMAX 0.4 MG capsule   Take 0.4 mg by mouth daily   Generic drug:  tamsulosin                                PANTOPRAZOLE SODIUM PO   Take 40 mg by mouth every morning (before breakfast)                                RAMIPRIL PO   Take 7.5 mg by mouth daily                                TRULICITY SC   Inject Subcutaneous once a week                                VITAMIN D (CHOLECALCIFEROL) PO   Take 2,000 Units by mouth 2 times daily

## 2018-11-30 NOTE — PATIENT INSTRUCTIONS
-Follow carbohydrate counting meal plan - 60 grams/meal, 15-30 grams/snack.   -Keep food logs to help you stay on track. .   -Continue to walk on treadmill - goal is at least 30 minutes most days of the week.   -Test your glucose 2x/day - fasting and 2 hours after supper.   -Target levels are fasting , 2 hours after supper less than 180.   -Bring your meter to follow up session.   -Follow up in January.   -Call with any concerns - PATRIZIA Short, -[phone].

## 2018-11-30 NOTE — IP AVS SNAPSHOT
[facility]    [address] [place] [id number]    Phone:  [phone]    Fax:  [phone]                                       After Visit Summary   11/30/2018    Donis Watt    MRN: [id number]           After Visit Summary Signature Page     I have received my discharge instructions, and my questions have been answered. I have discussed any challenges I see with this plan with the nurse or doctor.    ..........................................................................................................................................  Patient/Patient Representative Signature      ..........................................................................................................................................  Patient Representative Print Name and Relationship to Patient    ..................................................               ................................................  Date                                   Time    ..........................................................................................................................................  Reviewed by Signature/Title    ...................................................              ..............................................  Date                                               Time          22EPIC Rev 08/18

## 2018-12-03 NOTE — PROGRESS NOTES
"Diabetes Self-Management Education & Support    Diabetes Education Self Management & Training    SUBJECTIVE/OBJECTIVE:  Presents for: Individual review  Accompanied by:  González)  Diabetes education in the past 24mo: No  Focus of Visit: Reducing Risks  Diabetes type: Type 2  Date of diagnosis: 2008  Disease course: Worsening  How confident are you filling out medical forms by yourself:: Somewhat  Diabetes management related comments/concerns: Lowering glucose levels, lose weight.  Transportation concerns: No  Other concerns:: None  Cultural Influences/Ethnic Background:  American    Diabetes Symptoms & Complications  Blurred vision: Yes  Fatigue: No  Neuropathy: No  Foot ulcerations: No  Polydipsia: No  Polyphagia: No  Polyuria: No  Visual change: Yes  Weakness: No  Weight loss: No  Slow healing wounds: No  Recent Infection(s): No  Symptom course: Stable  Weight trend: Stable  Autonomic neuropathy: No  CVA: No  Heart disease: No  Nephropathy: No  Peripheral neuropathy: No  Peripheral Vascular Disease: No  Retinopathy: No    Patient Problem List and Family Medical History reviewed for relevant medical history, current medical status, and diabetes risk factors.    Vitals:  /88  Pulse 77  Ht 1.772 m (5' 9.75\")  Wt 123.9 kg (273 lb 1.6 oz)  SpO2 95%  BMI 39.47 kg/m2  Estimated body mass index is 39.47 kg/(m^2) as calculated from the following:    Height as of this encounter: 1.772 m (5' 9.75\").    Weight as of this encounter: 123.9 kg (273 lb 1.6 oz).   Last 3 BP:   BP Readings from Last 3 Encounters:   11/30/18 135/88   11/09/17 174/96   07/06/17 146/86       History   Smoking Status     Former Smoker   Smokeless Tobacco     Never Used       Labs:  Lab Results   Component Value Date    A1C 9.8 09/17/2018     Lab Results   Component Value Date     09/17/2018     Lab Results   Component Value Date     09/17/2018     HDL Cholesterol   Date Value Ref Range Status   09/17/2018 32 (L) >=60 mg/dL " Final   ]  GFR Estimate   Date Value Ref Range Status   2017 89 >60 mL/min/1.7m2 Final     Comment:     Non  GFR Calc     GFR Estimate If Black   Date Value Ref Range Status   2017 >90   GFR Calc   >60 mL/min/1.7m2 Final     Lab Results   Component Value Date    CR 0.86 2017     No results found for: MICROALBUMIN    Healthy Eating  Healthy Eating Assessed Today: Yes  Cultural/Moravian diet restrictions?: No  Patient on a regular basis: Eats 3 meals a day, Eats out more than once a week  Meal planning: Avoiding sweets  Meals include: Breakfast, Lunch, Dinner, Snacks  Breakfast: bagel and hard boiled egg  Lunch: salami sandwich  Dinner: 2 grilled chesse sandwiches or pasta  Snacks:  sugar free candy, nuts, chips  Beverages: Water, Milk (Crystal Lite)  Has patient met with a dietitian in the past?: Yes    Being Active  Being Active Assessed Today: Yes (started using treadmill)  Exercise:: Yes  Days per week of moderate to strenuous exercise (like a brisk walk): 7  On average, minutes per day of exercise at this level: 30  How intense was your typical exercise? : Light (like stretching or slow walking)  Exercise Minutes per Week: 210  Barrier to exercise: Physical limitation (has some hip issues)    Monitoring  Monitoring Assessed Today: Yes  Did patient bring glucose meter to appointment? : Yes (strips are )  Blood Glucose Meter: Other (Glucocaard Vital)  Home Glucose (Sugar) Monitoring: Other (Pt testing very sporadically.  Strips . )  New meter provided today.     Taking Medications  Diabetes Medication(s)     Incretin Mimetic Agents (GLP-1 Receptor Agonists) Sig    Dulaglutide (TRULICITY SC) Inject Subcutaneous once a week        Taking Medication Assessed Today: Yes  Current Treatments: Non-insulin Injectables (Trulicity 0.75 mg weekly. Stopped Invokana r/t excessive urination. )  Problems taking diabetes medications regularly?: Yes  Diabetes  medication side effects?: No  Treatment Compliance: All of the time    Problem Solving  Problem Solving Assessed Today: Yes  Hypoglycemia Frequency: Never     Reducing Risks  Diabetes Risks: Age over 45 years, Sedentary Lifestyle, Family History  CAD Risks: Diabetes Mellitus, Male sex, Obesity, Family history, Sedentary lifestyle, Stress  Has dilated eye exam at least once a year?: Yes  Sees dentist every 6 months?: Yes  Sees podiatrist (foot doctor)?: No    Healthy Coping  Healthy Coping Assessed Today: Yes  Emotional response to diabetes: Ready to learn  Informal Support system:: Spouse  Stage of change: PREPARATION (Decided to change - considering how)  Difficulty affording diabetes management supplies?: No  Support resources: None  Patient Activation Measure Survey Score:  No flowsheet data found.    ASSESSMENT:  Pt was diagnosed with diabetes about 10 years ago.  He had education at that time and initially did well but has had no education since.  He reports much stress in his life in the past year and thinks that may play a role in his elevated A1c.  His wife is present with him and she appears supportive.  They would like a review of meal planning today.  Pt does not want any medication adjustments until after next A1c check - I suspect he will need some.     Patient's most recent   Lab Results   Component Value Date    A1C 9.8 09/17/2018    is not meeting goal of <7.0    INTERVENTION:   Diabetes knowledge and skills assessment:     Patient is knowledgeable in diabetes management concepts related to: needs review in all areas.     Patient needs further education on the following diabetes management concepts: Healthy Eating and Being Active, monitoring, taking medications, risk reduction, healthy coping.     Based on learning assessment above, most appropriate setting for further diabetes education would be: Individual setting.    Education provided today on:  AADE Self-Care Behaviors:  Healthy Eating:  carbohydrate counting, consistency in amount, composition, and timing of food intake, weight reduction, portion control and label reading  Being Active: relationship to blood glucose and benefits in promoting weight loss.   Monitoring: purpose, proper technique, individual blood glucose targets, frequency of monitoring and proper sharps disposal  Reducing Risks: major complications of diabetes and A1C - goals, relating to blood glucose levels, how often to check  Patient was instructed on One Touch Verio meter and was able to provide an accurate return demonstration. Patient's blood glucose reading today was 169 mg/dL fasting. 2    Opportunities for ongoing education and support in diabetes-self management were discussed.    Pt verbalized understanding of concepts discussed and recommendations provided today.       Education Materials Provided:  Carbohydrate counting meal plan, food logs.     PLAN:  Follow carbohydrate counting meal plan - 60 grams/meal, 15-30 grams/snack.  Keep food logs to help you stay on track.   Continue to walk on treadmill daily with goal of at least 30 minutes daily.   Test glucose 2x/day - fasting and 2 hours after supper.   See Patient Instructions for co-developed, patient-stated behavior change goals.  AVS printed and provided to patient today. See Follow-Up section for recommended follow-up.    Time Spent: 75 minutes  Encounter Type: Individual    Any diabetes medication dose changes were made via the CDE Protocol and Collaborative Practice Agreement with the patient's referring provider. A copy of this encounter was shared with the provider.

## 2019-01-08 ENCOUNTER — TRANSFERRED RECORDS (OUTPATIENT)
Dept: HEALTH INFORMATION MANAGEMENT | Facility: CLINIC | Age: 69
End: 2019-01-08

## 2019-01-08 LAB — HBA1C MFR BLD: 7.2 % (ref 0–5.7)

## 2019-01-17 ENCOUNTER — HOSPITAL ENCOUNTER (OUTPATIENT)
Dept: EDUCATION SERVICES | Facility: HOSPITAL | Age: 69
Discharge: HOME | End: 2019-01-17
Attending: FAMILY MEDICINE | Admitting: FAMILY MEDICINE
Payer: MEDICARE

## 2019-01-17 VITALS
HEART RATE: 71 BPM | RESPIRATION RATE: 16 BRPM | BODY MASS INDEX: 38.61 KG/M2 | WEIGHT: 269.7 LBS | HEIGHT: 70 IN | OXYGEN SATURATION: 97 % | DIASTOLIC BLOOD PRESSURE: 90 MMHG | SYSTOLIC BLOOD PRESSURE: 160 MMHG

## 2019-01-17 DIAGNOSIS — E11.9 TYPE 2 DIABETES MELLITUS WITHOUT COMPLICATION, WITHOUT LONG-TERM CURRENT USE OF INSULIN (H): Primary | ICD-10-CM

## 2019-01-17 PROCEDURE — G0108 DIAB MANAGE TRN  PER INDIV: HCPCS | Performed by: DIETITIAN, REGISTERED

## 2019-01-17 ASSESSMENT — ANXIETY QUESTIONNAIRES
1. FEELING NERVOUS, ANXIOUS, OR ON EDGE: MORE THAN HALF THE DAYS
IF YOU CHECKED OFF ANY PROBLEMS ON THIS QUESTIONNAIRE, HOW DIFFICULT HAVE THESE PROBLEMS MADE IT FOR YOU TO DO YOUR WORK, TAKE CARE OF THINGS AT HOME, OR GET ALONG WITH OTHER PEOPLE: SOMEWHAT DIFFICULT
5. BEING SO RESTLESS THAT IT IS HARD TO SIT STILL: NOT AT ALL
7. FEELING AFRAID AS IF SOMETHING AWFUL MIGHT HAPPEN: NOT AT ALL
GAD7 TOTAL SCORE: 7
2. NOT BEING ABLE TO STOP OR CONTROL WORRYING: MORE THAN HALF THE DAYS
6. BECOMING EASILY ANNOYED OR IRRITABLE: SEVERAL DAYS
3. WORRYING TOO MUCH ABOUT DIFFERENT THINGS: SEVERAL DAYS

## 2019-01-17 ASSESSMENT — PAIN SCALES - GENERAL: PAINLEVEL: NO PAIN (0)

## 2019-01-17 ASSESSMENT — PATIENT HEALTH QUESTIONNAIRE - PHQ9
SUM OF ALL RESPONSES TO PHQ QUESTIONS 1-9: 4
5. POOR APPETITE OR OVEREATING: SEVERAL DAYS

## 2019-01-17 ASSESSMENT — MIFFLIN-ST. JEOR: SCORE: 2000.63

## 2019-01-17 NOTE — LETTER
"    1/17/2019        RE: Donis Watt  [address]  [facility] [address]        Diabetes Self-Management Education & Support    Diabetes Education Self Management & Training    SUBJECTIVE/OBJECTIVE:  Presents for: Individual review  Accompanied by: González)  Diabetes education in the past 24mo: No  Focus of Visit: Reducing Risks  Diabetes type: Type 2  Date of diagnosis: 2008  Disease course: Worsening  How confident are you filling out medical forms by yourself:: Somewhat  Diabetes management related comments/concerns: Lowering glucose levels, lose weight.  Transportation concerns: No  Other concerns:: None  Cultural Influences/Ethnic Background:  American    Diabetes Symptoms & Complications  Blurred vision: Yes(Has an appointment next Friday)  Fatigue: No  Neuropathy: No  Foot ulcerations: No  Polydipsia: No  Polyphagia: No  Polyuria: No  Visual change: Yes  Weakness: No  Weight loss: No  Slow healing wounds: No  Recent Infection(s): No  Other: Yes(started therapy for his shoulder)  Symptom course: Stable  Weight trend: Stable  Autonomic neuropathy: No  CVA: No  Heart disease: No  Nephropathy: No  Peripheral neuropathy: No  Peripheral Vascular Disease: No  Retinopathy: No    Patient Problem List and Family Medical History reviewed for relevant medical history, current medical status, and diabetes risk factors.    Vitals:  /90   Pulse 71   Resp 16   Ht 1.772 m (5' 9.75\")   Wt 122.3 kg (269 lb 11.2 oz)   SpO2 97%   BMI 38.98 kg/m     Estimated body mass index is 38.98 kg/m  as calculated from the following:    Height as of this encounter: 1.772 m (5' 9.75\").    Weight as of this encounter: 122.3 kg (269 lb 11.2 oz).   Weight is down 3.4# since last visit 11/30.   Last 3 BP:   BP Readings from Last 3 Encounters:   01/17/19 160/90   11/30/18 135/88   11/09/17 174/96   Pt encouraged to recheck BP at home as he does have cuff and to notify provider if levels remain consistently elevated.      History "   Smoking Status     Former Smoker   Smokeless Tobacco     Never Used       Labs:  Lab Results   Component Value Date    A1C 7.2 2019     Lab Results   Component Value Date     2018     Lab Results   Component Value Date     2018     HDL Cholesterol   Date Value Ref Range Status   2018 32 (L) >=60 mg/dL Final   ]  GFR Estimate   Date Value Ref Range Status   2017 89 >60 mL/min/1.7m2 Final     Comment:     Non  GFR Calc     GFR Estimate If Black   Date Value Ref Range Status   2017 >90   GFR Calc   >60 mL/min/1.7m2 Final     Lab Results   Component Value Date    CR 0.86 2017     No results found for: MICROALBUMIN    Healthy Eating  Healthy Eating Assessed Today: Yes  Cultural/Rastafarian diet restrictions?: No  Meal planning: Avoiding sweets  Meals include: Breakfast, Lunch, Dinner, Snacks  Breakfast: 2 slices bread - butter, 2 eggs, 2 turk, coffee  Lunch: sandwich, coffee or crystal light  Dinner: meat, salad, sometimes small portion of starch, milk or crystal light  Snacks: yogurt, sugar free candy   Beverages: Water, Milk(Crystal Lite)  Has patient met with a dietitian in the past?: Yes    Being Active  Being Active Assessed Today: Yes(started using treadmill)  Exercise:: Yes  Days per week of moderate to strenuous exercise (like a brisk walk): 4  On average, minutes per day of exercise at this level: 30  How intense was your typical exercise? : Moderate (like brisk walking)  Exercise Minutes per Week: 120  Barrier to exercise: Physical limitation(has some hip issues)    Monitoring  Monitoring Assessed Today: Yes  Did patient bring glucose meter to appointment? : Yes(strips are )  Blood Glucose Meter: One Touch  Home Glucose (Sugar) Monitorin-2 times per day  Blood glucose trend: Decreasing steadily  [id number]  2 hours post [id number]  Two week average is 158.     Taking Medications  Diabetes Medication(s)      Incretin Mimetic Agents (GLP-1 Receptor Agonists) Sig    dulaglutide (TRULICITY) 0.75 MG/0.5ML pen Inject Subcutaneous once a week        Taking Medication Assessed Today: Yes  Current Treatments: Non-insulin Injectables(Trulicity 0.75 mg weekly. Stopped Invokana r/t excessive urination. )  Problems taking diabetes medications regularly?: Yes  Diabetes medication side effects?: No  Treatment Compliance: All of the time    Problem Solving  Problem Solving Assessed Today: Yes  Hypoglycemia Frequency: Never    Reducing Risks  Diabetes Risks: Age over 45 years, Sedentary Lifestyle, Family History  CAD Risks: Diabetes Mellitus, Male sex, Obesity, Family history, Sedentary lifestyle, Stress  Has dilated eye exam at least once a year?: Yes  Sees dentist every 6 months?: Yes  Sees podiatrist (foot doctor)?: No    Healthy Coping  Healthy Coping Assessed Today: Yes  Emotional response to diabetes: Ready to learn  Informal Support system:: Spouse  Stage of change: PREPARATION (Decided to change - considering how)  Difficulty affording diabetes management supplies?: No  Support resources: None  Patient Activation Measure Survey Score:  No flowsheet data found.    ASSESSMENT:  Recent A1c much improved at 7.2% which is down from 9.9% in September.  Discussion notes that pt has made many positive changes to his diet.  His significant other is present and she helps him count carbohydrates and seems very supportive.  He has started using his treadmill for exercise - he can walk in 10 minute increments.  Tries to walk 20-30 minutes on most days.      Goals Addressed as of 1/17/2019                 Today       Patient Stated      Healthy Eating (pt-stated)   80%    Added 12/2/18 by Ria Morillo RD     My Goal: I will follow carbohydrate counting meal plan.     What I need to meet my goal: meal planning education, written meal plan.     I plan to meet my goal by this date: next visit.           Patient's most recent   Lab Results    Component Value Date    A1C 7.2 01/08/2019    is not meeting goal of <7.0    INTERVENTION:   Diabetes knowledge and skills assessment:     Patient is knowledgeable in diabetes management concepts related to: Healthy Eating, Being Active, Monitoring and Taking Medication    Patient needs further education on the following diabetes management concepts: Problem Solving, Reducing Risks and Healthy Coping    Based on learning assessment above, most appropriate setting for further diabetes education would be: Individual setting.    Education provided today on:  AADE Self-Care Behaviors:  Healthy Eating: weight reduction and portion control  Being Active: relationship to blood glucose and benefits in promoting weight loss.   Monitoring: individual blood glucose targets and frequency of monitoring  Taking Medication: current dosing and ability to increase dose if needed since not a maximum.   Reducing Risks: major complications of diabetes, A1C - goals, relating to blood glucose levels, how often to check and blood pressure and goals    Opportunities for ongoing education and support in diabetes-self management were discussed.    Pt verbalized understanding of concepts discussed and recommendations provided today.       Education Materials Provided:  None.     PLAN:  Continue efforts to eat low carbohydrate diet.   Try to get 30 minutes exercise most days of the week.   Test glucose 2x/day - fasting and 2 hours post supper.   See Patient Instructions for co-developed, patient-stated behavior change goals.  AVS printed and provided to patient today.   Follow up in three months for A1c check.     Time Spent: 30 minutes  Encounter Type: Individual    Any diabetes medication dose changes were made via the CDE Protocol and Collaborative Practice Agreement with the patient's referring provider. A copy of this encounter was shared with the provider.        Sincerely,        Ria Morillo RD

## 2019-01-17 NOTE — PROGRESS NOTES
"Diabetes Self-Management Education & Support    Diabetes Education Self Management & Training    SUBJECTIVE/OBJECTIVE:  Presents for: Individual review  Accompanied by: González)  Diabetes education in the past 24mo: No  Focus of Visit: Reducing Risks  Diabetes type: Type 2  Date of diagnosis: 2008  Disease course: Worsening  How confident are you filling out medical forms by yourself:: Somewhat  Diabetes management related comments/concerns: Lowering glucose levels, lose weight.  Transportation concerns: No  Other concerns:: None  Cultural Influences/Ethnic Background:  American    Diabetes Symptoms & Complications  Blurred vision: Yes(Has an appointment next Friday)  Fatigue: No  Neuropathy: No  Foot ulcerations: No  Polydipsia: No  Polyphagia: No  Polyuria: No  Visual change: Yes  Weakness: No  Weight loss: No  Slow healing wounds: No  Recent Infection(s): No  Other: Yes(started therapy for his shoulder)  Symptom course: Stable  Weight trend: Stable  Autonomic neuropathy: No  CVA: No  Heart disease: No  Nephropathy: No  Peripheral neuropathy: No  Peripheral Vascular Disease: No  Retinopathy: No    Patient Problem List and Family Medical History reviewed for relevant medical history, current medical status, and diabetes risk factors.    Vitals:  /90   Pulse 71   Resp 16   Ht 1.772 m (5' 9.75\")   Wt 122.3 kg (269 lb 11.2 oz)   SpO2 97%   BMI 38.98 kg/m    Estimated body mass index is 38.98 kg/m  as calculated from the following:    Height as of this encounter: 1.772 m (5' 9.75\").    Weight as of this encounter: 122.3 kg (269 lb 11.2 oz).   Weight is down 3.4# since last visit 11/30.   Last 3 BP:   BP Readings from Last 3 Encounters:   01/17/19 160/90   11/30/18 135/88   11/09/17 174/96   Pt encouraged to recheck BP at home as he does have cuff and to notify provider if levels remain consistently elevated.      History   Smoking Status     Former Smoker   Smokeless Tobacco     Never Used       Labs:  Lab " Results   Component Value Date    A1C 7.2 2019     Lab Results   Component Value Date     2018     Lab Results   Component Value Date     2018     HDL Cholesterol   Date Value Ref Range Status   2018 32 (L) >=60 mg/dL Final   ]  GFR Estimate   Date Value Ref Range Status   2017 89 >60 mL/min/1.7m2 Final     Comment:     Non  GFR Calc     GFR Estimate If Black   Date Value Ref Range Status   2017 >90   GFR Calc   >60 mL/min/1.7m2 Final     Lab Results   Component Value Date    CR 0.86 2017     No results found for: MICROALBUMIN    Healthy Eating  Healthy Eating Assessed Today: Yes  Cultural/Buddhist diet restrictions?: No  Meal planning: Avoiding sweets  Meals include: Breakfast, Lunch, Dinner, Snacks  Breakfast: 2 slices bread - butter, 2 eggs, 2 truk, coffee  Lunch: sandwich, coffee or crystal light  Dinner: meat, salad, sometimes small portion of starch, milk or crystal light  Snacks: yogurt, sugar free candy   Beverages: Water, Milk(Crystal Lite)  Has patient met with a dietitian in the past?: Yes    Being Active  Being Active Assessed Today: Yes(started using treadmill)  Exercise:: Yes  Days per week of moderate to strenuous exercise (like a brisk walk): 4  On average, minutes per day of exercise at this level: 30  How intense was your typical exercise? : Moderate (like brisk walking)  Exercise Minutes per Week: 120  Barrier to exercise: Physical limitation(has some hip issues)    Monitoring  Monitoring Assessed Today: Yes  Did patient bring glucose meter to appointment? : Yes(strips are )  Blood Glucose Meter: One Touch  Home Glucose (Sugar) Monitorin-2 times per day  Blood glucose trend: Decreasing steadily  [id number]  2 hours post [id number]  Two week average is 158.     Taking Medications  Diabetes Medication(s)     Incretin Mimetic Agents (GLP-1 Receptor Agonists) Sig    dulaglutide (TRULICITY)  0.75 MG/0.5ML pen Inject Subcutaneous once a week        Taking Medication Assessed Today: Yes  Current Treatments: Non-insulin Injectables(Trulicity 0.75 mg weekly. Stopped Invokana r/t excessive urination. )  Problems taking diabetes medications regularly?: Yes  Diabetes medication side effects?: No  Treatment Compliance: All of the time    Problem Solving  Problem Solving Assessed Today: Yes  Hypoglycemia Frequency: Never    Reducing Risks  Diabetes Risks: Age over 45 years, Sedentary Lifestyle, Family History  CAD Risks: Diabetes Mellitus, Male sex, Obesity, Family history, Sedentary lifestyle, Stress  Has dilated eye exam at least once a year?: Yes  Sees dentist every 6 months?: Yes  Sees podiatrist (foot doctor)?: No    Healthy Coping  Healthy Coping Assessed Today: Yes  Emotional response to diabetes: Ready to learn  Informal Support system:: Spouse  Stage of change: PREPARATION (Decided to change - considering how)  Difficulty affording diabetes management supplies?: No  Support resources: None  Patient Activation Measure Survey Score:  No flowsheet data found.    ASSESSMENT:  Recent A1c much improved at 7.2% which is down from 9.9% in September.  Discussion notes that pt has made many positive changes to his diet.  His significant other is present and she helps him count carbohydrates and seems very supportive.  He has started using his treadmill for exercise - he can walk in 10 minute increments.  Tries to walk 20-30 minutes on most days.      Goals Addressed as of 1/17/2019                 Today       Patient Stated      Healthy Eating (pt-stated)   80%    Added 12/2/18 by Ria Morillo RD     My Goal: I will follow carbohydrate counting meal plan.     What I need to meet my goal: meal planning education, written meal plan.     I plan to meet my goal by this date: next visit.           Patient's most recent   Lab Results   Component Value Date    A1C 7.2 01/08/2019    is not meeting goal of  <7.0    INTERVENTION:   Diabetes knowledge and skills assessment:     Patient is knowledgeable in diabetes management concepts related to: Healthy Eating, Being Active, Monitoring and Taking Medication    Patient needs further education on the following diabetes management concepts: Problem Solving, Reducing Risks and Healthy Coping    Based on learning assessment above, most appropriate setting for further diabetes education would be: Individual setting.    Education provided today on:  AADE Self-Care Behaviors:  Healthy Eating: weight reduction and portion control  Being Active: relationship to blood glucose and benefits in promoting weight loss.   Monitoring: individual blood glucose targets and frequency of monitoring  Taking Medication: current dosing and ability to increase dose if needed since not a maximum.   Reducing Risks: major complications of diabetes, A1C - goals, relating to blood glucose levels, how often to check and blood pressure and goals    Opportunities for ongoing education and support in diabetes-self management were discussed.    Pt verbalized understanding of concepts discussed and recommendations provided today.       Education Materials Provided:  None.     PLAN:  Continue efforts to eat low carbohydrate diet.   Try to get 30 minutes exercise most days of the week.   Test glucose 2x/day - fasting and 2 hours post supper.   See Patient Instructions for co-developed, patient-stated behavior change goals.  AVS printed and provided to patient today.   Follow up in three months for A1c check.     Time Spent: 30 minutes  Encounter Type: Individual    Any diabetes medication dose changes were made via the CDE Protocol and Collaborative Practice Agreement with the patient's referring provider. A copy of this encounter was shared with the provider.

## 2019-01-17 NOTE — PATIENT INSTRUCTIONS
-Keep following low carbohydrate meal plan.   -Try to get at least 30 minutes most days of the week.   -Continue to test your glucose 2x/day - fasting and 2 hours after supper.   -Target levels are fasting , 2 hours after supper less than 180.   -Keep taking your Trulicity .075 mg weekly.   -Follow up in three months for A1c check.   -Call with any concerns - PATRIZIA Short, [phone].

## 2019-01-18 ASSESSMENT — ANXIETY QUESTIONNAIRES: GAD7 TOTAL SCORE: 7

## 2019-02-27 ENCOUNTER — TRANSFERRED RECORDS (OUTPATIENT)
Dept: HEALTH INFORMATION MANAGEMENT | Facility: CLINIC | Age: 69
End: 2019-02-27

## 2019-04-26 ENCOUNTER — HOSPITAL ENCOUNTER (OUTPATIENT)
Dept: EDUCATION SERVICES | Facility: HOSPITAL | Age: 69
Discharge: HOME | End: 2019-04-26
Attending: FAMILY MEDICINE | Admitting: FAMILY MEDICINE
Payer: MEDICARE

## 2019-04-26 VITALS
HEART RATE: 91 BPM | DIASTOLIC BLOOD PRESSURE: 97 MMHG | RESPIRATION RATE: 20 BRPM | SYSTOLIC BLOOD PRESSURE: 150 MMHG | WEIGHT: 265.3 LBS | BODY MASS INDEX: 37.98 KG/M2 | OXYGEN SATURATION: 97 % | HEIGHT: 70 IN

## 2019-04-26 DIAGNOSIS — E11.9 TYPE 2 DIABETES MELLITUS WITHOUT COMPLICATION, WITHOUT LONG-TERM CURRENT USE OF INSULIN (H): Primary | ICD-10-CM

## 2019-04-26 LAB — HBA1C MFR BLD: 6.9 % (ref 0–5.7)

## 2019-04-26 PROCEDURE — 36416 COLLJ CAPILLARY BLOOD SPEC: CPT | Performed by: DIETITIAN, REGISTERED

## 2019-04-26 PROCEDURE — G0108 DIAB MANAGE TRN  PER INDIV: HCPCS | Performed by: DIETITIAN, REGISTERED

## 2019-04-26 PROCEDURE — 83036 HEMOGLOBIN GLYCOSYLATED A1C: CPT | Performed by: DIETITIAN, REGISTERED

## 2019-04-26 ASSESSMENT — PAIN SCALES - GENERAL: PAINLEVEL: NO PAIN (0)

## 2019-04-26 ASSESSMENT — MIFFLIN-ST. JEOR: SCORE: 1975.67

## 2019-04-26 NOTE — PROGRESS NOTES
"Diabetes Self-Management Education & Support    Diabetes Education Self Management & Training    SUBJECTIVE/OBJECTIVE:  Presents for: Individual review  Accompanied by: Self  Diabetes education in the past 24mo: No  Focus of Visit: Reducing Risks  Diabetes type: Type 2  Date of diagnosis: 2008  Disease course: Worsening  How confident are you filling out medical forms by yourself:: Somewhat  Diabetes management related comments/concerns: Lowering glucose levels, lose weight.  Transportation concerns: No  Other concerns:: None  Cultural Influences/Ethnic Background:  American    Diabetes Symptoms & Complications  Blurred vision: Yes-recent eye surgery  Fatigue: No  Neuropathy: No  Foot ulcerations: No  Polydipsia: No  Polyphagia: No  Polyuria: No  Visual change: Yes  Weakness: No  Weight loss: No  Slow healing wounds: No  Recent Infection(s): Yes(Recent fungal skin infection. )  Other: Yes(Eye surgery recently. )  Symptom course: Stable  Weight trend: Down 4.4# since January.   Autonomic neuropathy: No  CVA: No  Heart disease: No  Nephropathy: No  Peripheral neuropathy: No  Peripheral Vascular Disease: No  Retinopathy: No    Patient Problem List and Family Medical History reviewed for relevant medical history, current medical status, and diabetes risk factors.    Vitals:  /97   Pulse 91   Resp 20   Ht 1.772 m (5' 9.75\")   Wt 120.3 kg (265 lb 4.8 oz)   SpO2 97%   BMI 38.34 kg/m    Estimated body mass index is 38.34 kg/m  as calculated from the following:    Height as of this encounter: 1.772 m (5' 9.75\").    Weight as of this encounter: 120.3 kg (265 lb 4.8 oz).   Last 3 BP:   BP Readings from Last 3 Encounters:   04/26/19 150/97   01/17/19 160/90   11/30/18 135/88   BP elevated today.  Pt is very stressed and worked up about a situation with his ill sister who is in a SNF.     History   Smoking Status     Former Smoker   Smokeless Tobacco     Never Used       Labs:  Lab Results   Component Value Date    " A1C 6.9 2019     Lab Results   Component Value Date     2018     Lab Results   Component Value Date     2018     HDL Cholesterol   Date Value Ref Range Status   2018 32 (L) >=60 mg/dL Final   ]  GFR Estimate   Date Value Ref Range Status   2018 >60 ml/min/1.73m2 Final     GFR Estimate If Black   Date Value Ref Range Status   2017 >90   GFR Calc   >60 mL/min/1.7m2 Final     Lab Results   Component Value Date    CR 0.86 2018     No results found for: MICROALBUMIN    Healthy Eating  Healthy Eating Assessed Today: Yes  Cultural/Pentecostal diet restrictions?: No  Meal planning: Avoiding sweets  Meals include: Breakfast, Lunch, Dinner, Snacks  Breakfast: 2 slices bread - butter, 2 eggs, 2 turk, coffee  Lunch: sandwich, coffee or crystal light  Dinner: 2 hot dogs on buns or breakfast type meal at supper  Snacks: rice cakes or cocktail shrimp   Beverages: Water, Milk, Coffee, Diet soda(Crystal Lite)  Has patient met with a dietitian in the past?: Yes    Being Active  Being Active Assessed Today: Yes(started using treadmill)  Exercise:: Yes  Days per week of moderate to strenuous exercise (like a brisk walk): 3  On average, minutes per day of exercise at this level: 20  How intense was your typical exercise? : Light (like stretching or slow walking)  Exercise Minutes per Week: 60  Barrier to exercise: Physical limitation(has some hip issues)    Monitoring  Monitoring Assessed Today: Yes  Did patient bring glucose meter to appointment? : Yes(strips are )  Blood Glucose Meter: One Touch  Home Glucose (Sugar) Monitorin-2 times per day  Blood glucose trend: No change  [id number]  2 hours post [id number]  Two week average is 181.     Taking Medications  Diabetes Medication(s)     Incretin Mimetic Agents (GLP-1 Receptor Agonists)       dulaglutide (TRULICITY) 0.75 MG/0.5ML pen    Inject Subcutaneous once a week        Taking Medication  Assessed Today: Yes  Current Treatments: Non-insulin Injectables(Trulicity 0.75 mg weekly.)  Problems taking diabetes medications regularly?: No  Diabetes medication side effects?: No  Treatment Compliance: All of the time    Problem Solving  Problem Solving Assessed Today: Yes  Hypoglycemia Frequency: Never     Reducing Risks  Reducing Risks Assessed Today: No  Diabetes Risks: Age over 45 years, Sedentary Lifestyle, Family History  CAD Risks: Diabetes Mellitus, Male sex, Obesity, Family history, Sedentary lifestyle, Stress  Has dilated eye exam at least once a year?: Yes  Sees dentist every 6 months?: Yes  Sees podiatrist (foot doctor)?: No    Healthy Coping  Healthy Coping Assessed Today: Yes  Emotional response to diabetes: Ready to learn  Informal Support system:: Spouse  Stage of change: ACTION (Actively working towards change)  Difficulty affording diabetes management supplies?: No  Support resources: None  Patient Activation Measure Survey Score:  No flowsheet data found.    ASSESSMENT:  A1c continues to improve - 6.9% today - last check was 7.2%.  Pt has had some stress in life - eye surgery, family issues.  Weight is down 4.4# since January.      Goals Addressed as of 4/26/2019                 Today    1/17/19       Patient Stated      Healthy Eating (pt-stated)   On track  80%    Added 12/2/18 by Ria Morillo RD     My Goal: I will follow carbohydrate counting meal plan.     What I need to meet my goal: meal planning education, written meal plan.     I plan to meet my goal by this date: next visit.           Patient's most recent   Lab Results   Component Value Date    A1C 6.9 04/26/2019    is meeting goal of <7.0    INTERVENTION:   Diabetes knowledge and skills assessment:     Patient is knowledgeable in diabetes management concepts related to: Healthy Eating, Monitoring and Taking Medication    Patient needs further education on the following diabetes management concepts: Being Active and Reducing  Risks, Healthy Coping.    Based on learning assessment above, most appropriate setting for further diabetes education would be: Individual setting.    Education provided today on:  AADE Self-Care Behaviors:  Being Active: relationship to blood glucose  Reducing Risks: major complications of diabetes, A1C - goals, relating to blood glucose levels, how often to check and blood pressure and goals  Healthy Coping: benefits of making appropriate lifestyle changes and effects of stress on glucose levels.  Encouraged stress relief activities.     Opportunities for ongoing education and support in diabetes-self management were discussed.    Pt verbalized understanding of concepts discussed and recommendations provided today.       Education Materials Provided:  No new materials today.     PLAN:  Continue to follow low carbohydrate meal plan.   Try to get some routine exercise.  Test glucose 2x/day - fasting and 2 hours after supper meal.  See Patient Instructions for co-developed, patient-stated behavior change goals.  AVS printed and provided to patient today.   Follow up in three months for glucose review and A1c check.     Time Spent: 40 minutes  Encounter Type: Individual    Any diabetes medication dose changes were made via the CDE Protocol and Collaborative Practice Agreement with the patient's referring provider. A copy of this encounter was shared with the provider.

## 2019-04-26 NOTE — LETTER
"    4/26/2019        RE: Donis Watt  [address]  [facility] [address]        Diabetes Self-Management Education & Support    Diabetes Education Self Management & Training    SUBJECTIVE/OBJECTIVE:  Presents for: Individual review  Accompanied by: Self  Diabetes education in the past 24mo: No  Focus of Visit: Reducing Risks  Diabetes type: Type 2  Date of diagnosis: 2008  Disease course: Worsening  How confident are you filling out medical forms by yourself:: Somewhat  Diabetes management related comments/concerns: Lowering glucose levels, lose weight.  Transportation concerns: No  Other concerns:: None  Cultural Influences/Ethnic Background:  American    Diabetes Symptoms & Complications  Blurred vision: Yes-recent eye surgery  Fatigue: No  Neuropathy: No  Foot ulcerations: No  Polydipsia: No  Polyphagia: No  Polyuria: No  Visual change: Yes  Weakness: No  Weight loss: No  Slow healing wounds: No  Recent Infection(s): Yes(Recent fungal skin infection. )  Other: Yes(Eye surgery recently. )  Symptom course: Stable  Weight trend: Down 4.4# since January.   Autonomic neuropathy: No  CVA: No  Heart disease: No  Nephropathy: No  Peripheral neuropathy: No  Peripheral Vascular Disease: No  Retinopathy: No    Patient Problem List and Family Medical History reviewed for relevant medical history, current medical status, and diabetes risk factors.    Vitals:  /97   Pulse 91   Resp 20   Ht 1.772 m (5' 9.75\")   Wt 120.3 kg (265 lb 4.8 oz)   SpO2 97%   BMI 38.34 kg/m     Estimated body mass index is 38.34 kg/m  as calculated from the following:    Height as of this encounter: 1.772 m (5' 9.75\").    Weight as of this encounter: 120.3 kg (265 lb 4.8 oz).   Last 3 BP:   BP Readings from Last 3 Encounters:   04/26/19 150/97   01/17/19 160/90   11/30/18 135/88   BP elevated today.  Pt is very stressed and worked up about a situation with his ill sister who is in a SNF.     History   Smoking Status     Former Smoker "   Smokeless Tobacco     Never Used       Labs:  Lab Results   Component Value Date    A1C 6.9 2019     Lab Results   Component Value Date     2018     Lab Results   Component Value Date     2018     HDL Cholesterol   Date Value Ref Range Status   2018 32 (L) >=60 mg/dL Final   ]  GFR Estimate   Date Value Ref Range Status   2018 >60 ml/min/1.73m2 Final     GFR Estimate If Black   Date Value Ref Range Status   2017 >90   GFR Calc   >60 mL/min/1.7m2 Final     Lab Results   Component Value Date    CR 0.86 2018     No results found for: MICROALBUMIN    Healthy Eating  Healthy Eating Assessed Today: Yes  Cultural/Advent diet restrictions?: No  Meal planning: Avoiding sweets  Meals include: Breakfast, Lunch, Dinner, Snacks  Breakfast: 2 slices bread - butter, 2 eggs, 2 turk, coffee  Lunch: sandwich, coffee or crystal light  Dinner: 2 hot dogs on buns or breakfast type meal at supper  Snacks: rice cakes or cocktail shrimp   Beverages: Water, Milk, Coffee, Diet soda(Crystal Lite)  Has patient met with a dietitian in the past?: Yes    Being Active  Being Active Assessed Today: Yes(started using treadmill)  Exercise:: Yes  Days per week of moderate to strenuous exercise (like a brisk walk): 3  On average, minutes per day of exercise at this level: 20  How intense was your typical exercise? : Light (like stretching or slow walking)  Exercise Minutes per Week: 60  Barrier to exercise: Physical limitation(has some hip issues)    Monitoring  Monitoring Assessed Today: Yes  Did patient bring glucose meter to appointment? : Yes(strips are )  Blood Glucose Meter: One Touch  Home Glucose (Sugar) Monitorin-2 times per day  Blood glucose trend: No change  [id number]  2 hours post [id number]  Two week average is 181.     Taking Medications  Diabetes Medication(s)     Incretin Mimetic Agents (GLP-1 Receptor Agonists)       dulaglutide  (TRULICITY) 0.75 MG/0.5ML pen    Inject Subcutaneous once a week        Taking Medication Assessed Today: Yes  Current Treatments: Non-insulin Injectables(Trulicity 0.75 mg weekly.)  Problems taking diabetes medications regularly?: No  Diabetes medication side effects?: No  Treatment Compliance: All of the time    Problem Solving  Problem Solving Assessed Today: Yes  Hypoglycemia Frequency: Never     Reducing Risks  Reducing Risks Assessed Today: No  Diabetes Risks: Age over 45 years, Sedentary Lifestyle, Family History  CAD Risks: Diabetes Mellitus, Male sex, Obesity, Family history, Sedentary lifestyle, Stress  Has dilated eye exam at least once a year?: Yes  Sees dentist every 6 months?: Yes  Sees podiatrist (foot doctor)?: No    Healthy Coping  Healthy Coping Assessed Today: Yes  Emotional response to diabetes: Ready to learn  Informal Support system:: Spouse  Stage of change: ACTION (Actively working towards change)  Difficulty affording diabetes management supplies?: No  Support resources: None  Patient Activation Measure Survey Score:  No flowsheet data found.    ASSESSMENT:  A1c continues to improve - 6.9% today - last check was 7.2%.  Pt has had some stress in life - eye surgery, family issues.  Weight is down 4.4# since January.      Goals Addressed as of 4/26/2019                 Today    1/17/19       Patient Stated      Healthy Eating (pt-stated)   On track  80%    Added 12/2/18 by Ria Morillo RD     My Goal: I will follow carbohydrate counting meal plan.     What I need to meet my goal: meal planning education, written meal plan.     I plan to meet my goal by this date: next visit.           Patient's most recent   Lab Results   Component Value Date    A1C 6.9 04/26/2019    is meeting goal of <7.0    INTERVENTION:   Diabetes knowledge and skills assessment:     Patient is knowledgeable in diabetes management concepts related to: Healthy Eating, Monitoring and Taking Medication    Patient needs  further education on the following diabetes management concepts: Being Active and Reducing Risks, Healthy Coping.    Based on learning assessment above, most appropriate setting for further diabetes education would be: Individual setting.    Education provided today on:  AADE Self-Care Behaviors:  Being Active: relationship to blood glucose  Reducing Risks: major complications of diabetes, A1C - goals, relating to blood glucose levels, how often to check and blood pressure and goals  Healthy Coping: benefits of making appropriate lifestyle changes and effects of stress on glucose levels.  Encouraged stress relief activities.     Opportunities for ongoing education and support in diabetes-self management were discussed.    Pt verbalized understanding of concepts discussed and recommendations provided today.       Education Materials Provided:  No new materials today.     PLAN:  Continue to follow low carbohydrate meal plan.   Try to get some routine exercise.  Test glucose 2x/day - fasting and 2 hours after supper meal.  See Patient Instructions for co-developed, patient-stated behavior change goals.  AVS printed and provided to patient today.   Follow up in three months for glucose review and A1c check.     Time Spent: 40 minutes  Encounter Type: Individual    Any diabetes medication dose changes were made via the CDE Protocol and Collaborative Practice Agreement with the patient's referring provider. A copy of this encounter was shared with the provider.        Sincerely,        Ria Morillo RD

## 2019-05-29 ENCOUNTER — TRANSFERRED RECORDS (OUTPATIENT)
Dept: HEALTH INFORMATION MANAGEMENT | Facility: CLINIC | Age: 69
End: 2019-05-29

## 2019-05-29 LAB — RETINOPATHY: NEGATIVE

## 2019-07-25 DIAGNOSIS — E11.65 TYPE 2 DIABETES MELLITUS WITH HYPERGLYCEMIA, WITHOUT LONG-TERM CURRENT USE OF INSULIN (H): ICD-10-CM

## 2019-07-29 ENCOUNTER — HOSPITAL ENCOUNTER (OUTPATIENT)
Dept: EDUCATION SERVICES | Facility: HOSPITAL | Age: 69
Discharge: HOME | End: 2019-07-29
Attending: NURSE PRACTITIONER | Admitting: NURSE PRACTITIONER
Payer: MEDICARE

## 2019-07-29 VITALS
DIASTOLIC BLOOD PRESSURE: 91 MMHG | BODY MASS INDEX: 37.8 KG/M2 | HEIGHT: 70 IN | SYSTOLIC BLOOD PRESSURE: 140 MMHG | HEART RATE: 71 BPM | WEIGHT: 264 LBS | OXYGEN SATURATION: 98 %

## 2019-07-29 DIAGNOSIS — E11.65 TYPE 2 DIABETES MELLITUS WITH HYPERGLYCEMIA, WITHOUT LONG-TERM CURRENT USE OF INSULIN (H): ICD-10-CM

## 2019-07-29 LAB — HBA1C MFR BLD: 6.2 % (ref 0–5.7)

## 2019-07-29 PROCEDURE — G0108 DIAB MANAGE TRN  PER INDIV: HCPCS | Performed by: DIETITIAN, REGISTERED

## 2019-07-29 PROCEDURE — 83036 HEMOGLOBIN GLYCOSYLATED A1C: CPT | Performed by: DIETITIAN, REGISTERED

## 2019-07-29 PROCEDURE — 36416 COLLJ CAPILLARY BLOOD SPEC: CPT | Performed by: DIETITIAN, REGISTERED

## 2019-07-29 ASSESSMENT — PAIN SCALES - GENERAL: PAINLEVEL: MODERATE PAIN (5)

## 2019-07-29 ASSESSMENT — MIFFLIN-ST. JEOR: SCORE: 1973.75

## 2019-07-29 NOTE — LETTER
"    7/29/2019        RE: Donis Watt  [address]  [facility] [address]        Diabetes Self-Management Education & Support    Diabetes Education Self Management & Training    SUBJECTIVE/OBJECTIVE:  Presents for: Individual review  Accompanied by: Spouse  Diabetes education in the past 24mo: No  Focus of Visit: Reducing Risks  Diabetes type: Type 2  Date of diagnosis: 2008  Disease course: Improving  How confident are you filling out medical forms by yourself:: Somewhat  Diabetes management related comments/concerns: Lowering glucose levels, lose weight.  Transportation concerns: No  Other concerns:: None  Cultural Influences/Ethnic Background:  American    Diabetes Symptoms & Complications  Blurred vision: No  Fatigue: No  Neuropathy: No  Foot ulcerations: No  Polydipsia: No  Polyphagia: No  Polyuria: No  Visual change: No  Weakness: No  Weight loss: No  Slow healing wounds: No  Recent Infection(s): Yes(Recent fungal skin infection. )  Other: Yes(Needs shoulder surgery - will see ortho soon)  Symptom course: Stable  Weight trend: Stable  Autonomic neuropathy: No  CVA: No  Heart disease: No  Nephropathy: No  Peripheral neuropathy: No  Peripheral Vascular Disease: No  Retinopathy: No    Patient Problem List and Family Medical History reviewed for relevant medical history, current medical status, and diabetes risk factors.    Vitals:  /91 (BP Location: Right arm, Patient Position: Chair, Cuff Size: Adult Regular)   Pulse 71   Ht 1.778 m (5' 10\")   Wt 119.7 kg (264 lb)   SpO2 98%   BMI 37.88 kg/m     Estimated body mass index is 37.88 kg/m  as calculated from the following:    Height as of this encounter: 1.778 m (5' 10\").    Weight as of this encounter: 119.7 kg (264 lb).   Last 3 BP:   BP Readings from Last 3 Encounters:   07/29/19 140/91   04/26/19 150/97   01/17/19 160/90       History   Smoking Status     Former Smoker   Smokeless Tobacco     Never Used       Labs:  Lab Results   Component Value Date "    A1C 6.2 2019     Lab Results   Component Value Date     2018     Lab Results   Component Value Date     2018     HDL Cholesterol   Date Value Ref Range Status   2018 32 (L) >=60 mg/dL Final   ]  GFR Estimate   Date Value Ref Range Status   2018 >60 ml/min/1.73m2 Final     GFR Estimate If Black   Date Value Ref Range Status   2017 >90   GFR Calc   >60 mL/min/1.7m2 Final     Lab Results   Component Value Date    CR 0.86 2018     No results found for: MICROALBUMIN    Healthy Eating  Healthy Eating Assessed Today: Yes  Cultural/Taoist diet restrictions?: No  Meal planning: Avoiding sweets  Meals include: Breakfast, Lunch, Dinner, Snacks  Breakfast: 2 slices bread - butter, 2 eggs, 2 turk, coffee  Lunch: sandwich, coffee or crystal light   Dinner: meat, potatoes, green beans or salad  Snacks: sugar free cookie or sugar free candy, popcorn   Beverages: Water, Milk, Coffee, Diet soda(Crystal Lite)  Has patient met with a dietitian in the past?: Yes    Being Active  Being Active Assessed Today: No  Exercise:: Currently not exercising(shoulder issue - does walk 6575-7924 steps daily)  Barrier to exercise: Physical limitation(needs shoulder surgery)    Monitoring  Monitoring Assessed Today: Yes  Did patient bring glucose meter to appointment? : Yes(strips are )  Blood Glucose Meter: One Touch  Home Glucose (Sugar) Monitorin-2 times per day  Blood glucose trend: Decreasing steadily  [id number]  2 hours post [id number] with one at 214 after overeating  Two week average is 148.     Taking Medications  Diabetes Medication(s)     Incretin Mimetic Agents (GLP-1 Receptor Agonists)       dulaglutide (TRULICITY) 0.75 MG/0.5ML pen    Inject Subcutaneous once a week        Taking Medication Assessed Today: Yes  Current Treatments: Non-insulin Injectables, Diet(Trulicity 0.75 mg weekly.)  Problems taking diabetes medications regularly?:  No  Diabetes medication side effects?: No  Treatment Compliance: All of the time    Problem Solving  Problem Solving Assessed Today: Yes  Hypoglycemia Frequency: Rarely  Hypoglycemia Treatment: Other food     Reducing Risks  Reducing Risks Assessed Today: No  Diabetes Risks: Age over 45 years, Sedentary Lifestyle, Family History  CAD Risks: Diabetes Mellitus, Male sex, Obesity, Family history, Sedentary lifestyle, Stress  Has dilated eye exam at least once a year?: Yes  Sees dentist every 6 months?: Yes  Sees podiatrist (foot doctor)?: No    Healthy Coping  Healthy Coping Assessed Today: Yes  Emotional response to diabetes: Ready to learn  Informal Support system:: Spouse  Stage of change: ACTION (Actively working towards change)  Difficulty affording diabetes management supplies?: No  Support resources: None  Patient Activation Measure Survey Score:  No flowsheet data found.    ASSESSMENT:  A1c today was 6.2% which is improved from last A1c of 6.9% in April. No weight change from visit in April.  Discussion notes pt continues to follow low carbohydrate meal plan.  He is currently not doing much activity as he hurt his shoulder and may need surgery.      Goals Addressed as of 7/29/2019                 Today    4/26/19       Patient Stated      Healthy Eating (pt-stated)   On track  On track    Added 12/2/18 by Ria Morillo RD     My Goal: I will follow carbohydrate counting meal plan.     What I need to meet my goal: meal planning education, written meal plan.     I plan to meet my goal by this date: next visit.           Patient's most recent   Lab Results   Component Value Date    A1C 6.2 07/29/2019    is meeting goal of <7.0    INTERVENTION:   Diabetes knowledge and skills assessment:     Patient is knowledgeable in diabetes management concepts related to: Healthy Eating, Being Active, Monitoring, Taking Medications,  Problem Solving, Reducing Risks     Patient needs further education on the following diabetes  management concepts: None.  Pt is doing well currently.     Based on learning assessment above, most appropriate setting for further diabetes education would be: Individual setting.    Education provided today on:  AADE Self-Care Behaviors:  Reducing Risks: A1c goals/frequency, Preventing complications.     Opportunities for ongoing education and support in diabetes-self management were discussed.    Pt verbalized understanding of concepts discussed and recommendations provided today.       Education Materials Provided:  No new materials today.     PLAN:  Continue to follow low carbohydrate meal plan.   Try to get some exercise daily as able.   Test glucose 1-2x/day - alternate times.   Keep taking current dose of Trulicity.   See Patient Instructions for co-developed, patient-stated behavior change goals.  AVS printed and provided to patient today.   Follow up in 6 months for glucose review/A1c check.    Time Spent: 30 minutes  Encounter Type: Individual    Any diabetes medication dose changes were made via the CDE Protocol and Collaborative Practice Agreement with the patient's referring provider. A copy of this encounter was shared with the provider.        Sincerely,        Ria Morillo RD

## 2019-07-29 NOTE — PROGRESS NOTES
"Diabetes Self-Management Education & Support    Diabetes Education Self Management & Training    SUBJECTIVE/OBJECTIVE:  Presents for: Individual review  Accompanied by: Spouse  Diabetes education in the past 24mo: No  Focus of Visit: Reducing Risks  Diabetes type: Type 2  Date of diagnosis: 2008  Disease course: Improving  How confident are you filling out medical forms by yourself:: Somewhat  Diabetes management related comments/concerns: Lowering glucose levels, lose weight.  Transportation concerns: No  Other concerns:: None  Cultural Influences/Ethnic Background:  American    Diabetes Symptoms & Complications  Blurred vision: No  Fatigue: No  Neuropathy: No  Foot ulcerations: No  Polydipsia: No  Polyphagia: No  Polyuria: No  Visual change: No  Weakness: No  Weight loss: No  Slow healing wounds: No  Recent Infection(s): Yes(Recent fungal skin infection. )  Other: Yes(Needs shoulder surgery - will see ortho soon)  Symptom course: Stable  Weight trend: Stable  Autonomic neuropathy: No  CVA: No  Heart disease: No  Nephropathy: No  Peripheral neuropathy: No  Peripheral Vascular Disease: No  Retinopathy: No    Patient Problem List and Family Medical History reviewed for relevant medical history, current medical status, and diabetes risk factors.    Vitals:  /91 (BP Location: Right arm, Patient Position: Chair, Cuff Size: Adult Regular)   Pulse 71   Ht 1.778 m (5' 10\")   Wt 119.7 kg (264 lb)   SpO2 98%   BMI 37.88 kg/m    Estimated body mass index is 37.88 kg/m  as calculated from the following:    Height as of this encounter: 1.778 m (5' 10\").    Weight as of this encounter: 119.7 kg (264 lb).   Last 3 BP:   BP Readings from Last 3 Encounters:   07/29/19 140/91   04/26/19 150/97   01/17/19 160/90       History   Smoking Status     Former Smoker   Smokeless Tobacco     Never Used       Labs:  Lab Results   Component Value Date    A1C 6.2 07/29/2019     Lab Results   Component Value Date     09/17/2018 "     Lab Results   Component Value Date     2018     HDL Cholesterol   Date Value Ref Range Status   2018 32 (L) >=60 mg/dL Final   ]  GFR Estimate   Date Value Ref Range Status   2018 >60 ml/min/1.73m2 Final     GFR Estimate If Black   Date Value Ref Range Status   2017 >90   GFR Calc   >60 mL/min/1.7m2 Final     Lab Results   Component Value Date    CR 0.86 2018     No results found for: MICROALBUMIN    Healthy Eating  Healthy Eating Assessed Today: Yes  Cultural/Bahai diet restrictions?: No  Meal planning: Avoiding sweets  Meals include: Breakfast, Lunch, Dinner, Snacks  Breakfast: 2 slices bread - butter, 2 eggs, 2 turk, coffee  Lunch: sandwich, coffee or crystal light   Dinner: meat, potatoes, green beans or salad  Snacks: sugar free cookie or sugar free candy, popcorn   Beverages: Water, Milk, Coffee, Diet soda(Crystal Lite)  Has patient met with a dietitian in the past?: Yes    Being Active  Being Active Assessed Today: No  Exercise:: Currently not exercising(shoulder issue - does walk 7970-9346 steps daily)  Barrier to exercise: Physical limitation(needs shoulder surgery)    Monitoring  Monitoring Assessed Today: Yes  Did patient bring glucose meter to appointment? : Yes(strips are )  Blood Glucose Meter: One Touch  Home Glucose (Sugar) Monitorin-2 times per day  Blood glucose trend: Decreasing steadily  [id number]  2 hours post [id number] with one at 214 after overeating  Two week average is 148.     Taking Medications  Diabetes Medication(s)     Incretin Mimetic Agents (GLP-1 Receptor Agonists)       dulaglutide (TRULICITY) 0.75 MG/0.5ML pen    Inject Subcutaneous once a week        Taking Medication Assessed Today: Yes  Current Treatments: Non-insulin Injectables, Diet(Trulicity 0.75 mg weekly.)  Problems taking diabetes medications regularly?: No  Diabetes medication side effects?: No  Treatment Compliance: All of the  time    Problem Solving  Problem Solving Assessed Today: Yes  Hypoglycemia Frequency: Rarely  Hypoglycemia Treatment: Other food     Reducing Risks  Reducing Risks Assessed Today: No  Diabetes Risks: Age over 45 years, Sedentary Lifestyle, Family History  CAD Risks: Diabetes Mellitus, Male sex, Obesity, Family history, Sedentary lifestyle, Stress  Has dilated eye exam at least once a year?: Yes  Sees dentist every 6 months?: Yes  Sees podiatrist (foot doctor)?: No    Healthy Coping  Healthy Coping Assessed Today: Yes  Emotional response to diabetes: Ready to learn  Informal Support system:: Spouse  Stage of change: ACTION (Actively working towards change)  Difficulty affording diabetes management supplies?: No  Support resources: None  Patient Activation Measure Survey Score:  No flowsheet data found.    ASSESSMENT:  A1c today was 6.2% which is improved from last A1c of 6.9% in April. No weight change from visit in April.  Discussion notes pt continues to follow low carbohydrate meal plan.  He is currently not doing much activity as he hurt his shoulder and may need surgery.      Goals Addressed as of 7/29/2019                 Today    4/26/19       Patient Stated      Healthy Eating (pt-stated)   On track  On track    Added 12/2/18 by Ria Morillo RD     My Goal: I will follow carbohydrate counting meal plan.     What I need to meet my goal: meal planning education, written meal plan.     I plan to meet my goal by this date: next visit.           Patient's most recent   Lab Results   Component Value Date    A1C 6.2 07/29/2019    is meeting goal of <7.0    INTERVENTION:   Diabetes knowledge and skills assessment:     Patient is knowledgeable in diabetes management concepts related to: Healthy Eating, Being Active, Monitoring, Taking Medications,  Problem Solving, Reducing Risks     Patient needs further education on the following diabetes management concepts: None.  Pt is doing well currently.     Based on  learning assessment above, most appropriate setting for further diabetes education would be: Individual setting.    Education provided today on:  AADE Self-Care Behaviors:  Reducing Risks: A1c goals/frequency, Preventing complications.     Opportunities for ongoing education and support in diabetes-self management were discussed.    Pt verbalized understanding of concepts discussed and recommendations provided today.       Education Materials Provided:  No new materials today.     PLAN:  Continue to follow low carbohydrate meal plan.   Try to get some exercise daily as able.   Test glucose 1-2x/day - alternate times.   Keep taking current dose of Trulicity.   See Patient Instructions for co-developed, patient-stated behavior change goals.  AVS printed and provided to patient today.   Follow up in 6 months for glucose review/A1c check.    Time Spent: 30 minutes  Encounter Type: Individual    Any diabetes medication dose changes were made via the CDE Protocol and Collaborative Practice Agreement with the patient's referring provider. A copy of this encounter was shared with the provider.

## 2019-07-29 NOTE — PATIENT INSTRUCTIONS
-Keep limiting foods high in carbohydrates.   -Try to be as active as able.  Exercise helps lower glucose levels.  -Test glucose 1-2x/day - good times are fasting and 2 hours after supper.    -Target levels are fasting , 2 hours after supper less than 180.    -A1c today was 6.2% - great job!  Goal is to keep A1c less than 7.0%.    -Keep taking your same dose of Trulicity.    -Follow up in 6 months.    -Call with any concerns - PATRIZIA Short, -[phone].

## 2019-08-27 ENCOUNTER — TRANSFERRED RECORDS (OUTPATIENT)
Dept: HEALTH INFORMATION MANAGEMENT | Facility: CLINIC | Age: 69
End: 2019-08-27

## 2019-09-13 ENCOUNTER — TRANSFERRED RECORDS (OUTPATIENT)
Dept: HEALTH INFORMATION MANAGEMENT | Facility: CLINIC | Age: 69
End: 2019-09-13

## 2019-09-18 ENCOUNTER — HOSPITAL ENCOUNTER (OUTPATIENT)
Dept: PHYSICAL THERAPY | Facility: HOSPITAL | Age: 69
Setting detail: THERAPIES SERIES
End: 2019-09-18
Attending: SPECIALIST
Payer: MEDICARE

## 2019-09-18 PROCEDURE — 97110 THERAPEUTIC EXERCISES: CPT | Mod: GP

## 2019-09-18 PROCEDURE — 97035 APP MDLTY 1+ULTRASOUND EA 15: CPT | Mod: GP

## 2019-09-18 PROCEDURE — 97161 PT EVAL LOW COMPLEX 20 MIN: CPT | Mod: GP

## 2019-09-18 NOTE — PROGRESS NOTES
09/18/19 0800   General Information   Type of Visit Initial OP Ortho PT Evaluation   Start of Care Date 09/18/19   Referring Physician Dr. Silva    Orders Evaluate and Treat   Date of Order 09/13/19   Medical Diagnosis s/p L RCR   Surgical/Medical history reviewed Yes   Body Part(s)   Body Part(s) Shoulder   Presentation and Etiology   Pertinent history of current problem (include personal factors and/or comorbidities that impact the POC) Pt reports he had his RTC repaired 8/27/19. Reports this is his third shoulder surgery. He was doing well afer his second surgery but unfortunately fell in June and re-tore his RTC. He has been feeling pretty good and only taking over the counter pain medication. Has had ultrasound in the past and found it very beneficial   Impairments D. Decreased ROM;E. Decreased flexibility;F. Decreased strength and endurance   Functional Limitations perform activities of daily living   Symptom Location L shoulder   Onset date of current episode/exacerbation 08/27/19   Chronicity New   Pain rating (0-10 point scale) Best (/10);Worst (/10)   Best (/10) 0   Worst (/10) 5   Pain quality A. Sharp;B. Dull;C. Aching   Frequency of pain/symptoms B. Intermittent   Pain/symptoms are: Worse during the day   Pain/symptoms exacerbated by M. Other  (Movement)   Pain/symptoms eased by F. Certain positions;J. Braces/supports   Progression of symptoms since onset: Improved   Current Level of Function   Patient role/employment history F. Retired   Shoulder Objective Findings   Side (if bilateral, select both right and left) Left   Observation Wearing sling, no acute distress   Integumentary  Shoulder site appears to be healing normally   Posture Protracted shoulders, forward   Palpation Guarded and painful with PROM, pt has a very hard time relaxing   Left Shoulder Flexion PROM 90 degrees, limited by pain   Left Shoulder Abduction PROM 90 degrees limited by pain   Left Shoulder ER PROM 50% impaired   Left  Shoulder IR PROM 75% impaired   Left Shoulder Flexion Strength MMT not performed due to post-surgical status   Planned Therapy Interventions   Planned Therapy Interventions gait training;joint mobilization;manual therapy;ROM;strengthening;stretching   Planned Modality Interventions   Planned Modality Interventions Ultrasound;Hot packs;Cryotherapy   Clinical Impression   Criteria for Skilled Therapeutic Interventions Met yes, treatment indicated   PT Diagnosis s/p L RTC repair   Influenced by the following impairments Impaired strength, impaired ROM, pain limiting function   Functional limitations due to impairments Minimal functional use of LUE   Clinical Presentation Stable/Uncomplicated   Clinical Presentation Rationale Clinical judgement   Clinical Decision Making (Complexity) Low complexity   Therapy Frequency 2 times/Week   Predicted Duration of Therapy Intervention (days/wks) up to 90 days   Risk & Benefits of therapy have been explained Yes   Patient, Family & other staff in agreement with plan of care Yes   Clinical Impression Comments Pt is s/p L RTC tear with impaired ROM, weakness and pain expected to improve with skilled PT services   ORTHO GOALS   PT Ortho Eval Goals 1;2;3   Ortho Goal 1   Goal Identifier STG 1   Goal Description Pt will demonstrate knowledge/understanding of HEP and report daily compliance   Target Date 10/02/19   Ortho Goal 2   Goal Identifier LTG 1   Goal Description Pt will use LUE during all daily activities without being limited by pain   Target Date 12/17/19   Ortho Goal 3   Goal Identifier LTG 2   Goal Description Pt will use LUE during all daily activities without being limited by weakness or impaired ROM   Target Date 12/17/19   Total Evaluation Time   PT Estefany, Low Complexity Minutes (04980) 15     I certify the need for these services furnished under this plan of treatment and while under my care. (Physician co-signature of this document indicates review and certification of  the therapy plan).      _____________________________     __________________________    ____________  Physician's Signature                 Date               Time

## 2019-09-20 ENCOUNTER — HOSPITAL ENCOUNTER (OUTPATIENT)
Dept: PHYSICAL THERAPY | Facility: HOSPITAL | Age: 69
Setting detail: THERAPIES SERIES
End: 2019-09-20
Attending: SPECIALIST
Payer: MEDICARE

## 2019-09-20 PROCEDURE — 97110 THERAPEUTIC EXERCISES: CPT | Mod: GP

## 2019-09-23 ENCOUNTER — HOSPITAL ENCOUNTER (OUTPATIENT)
Dept: PHYSICAL THERAPY | Facility: HOSPITAL | Age: 69
Setting detail: THERAPIES SERIES
End: 2019-09-23
Attending: SPECIALIST
Payer: MEDICARE

## 2019-09-23 PROCEDURE — 97110 THERAPEUTIC EXERCISES: CPT | Mod: GP

## 2019-09-26 ENCOUNTER — HOSPITAL ENCOUNTER (OUTPATIENT)
Dept: PHYSICAL THERAPY | Facility: HOSPITAL | Age: 69
Setting detail: THERAPIES SERIES
End: 2019-09-26
Attending: FAMILY MEDICINE
Payer: MEDICARE

## 2019-09-26 PROCEDURE — 97110 THERAPEUTIC EXERCISES: CPT | Mod: GP

## 2019-10-01 ENCOUNTER — HOSPITAL ENCOUNTER (OUTPATIENT)
Dept: PHYSICAL THERAPY | Facility: HOSPITAL | Age: 69
Setting detail: THERAPIES SERIES
End: 2019-10-01
Attending: FAMILY MEDICINE
Payer: MEDICARE

## 2019-10-01 PROCEDURE — 97110 THERAPEUTIC EXERCISES: CPT | Mod: GP

## 2019-10-03 ENCOUNTER — HOSPITAL ENCOUNTER (OUTPATIENT)
Dept: PHYSICAL THERAPY | Facility: HOSPITAL | Age: 69
Setting detail: THERAPIES SERIES
End: 2019-10-03
Attending: FAMILY MEDICINE
Payer: MEDICARE

## 2019-10-03 PROCEDURE — 97110 THERAPEUTIC EXERCISES: CPT | Mod: GP

## 2019-10-08 ENCOUNTER — HOSPITAL ENCOUNTER (OUTPATIENT)
Dept: PHYSICAL THERAPY | Facility: HOSPITAL | Age: 69
Setting detail: THERAPIES SERIES
End: 2019-10-08
Attending: FAMILY MEDICINE
Payer: MEDICARE

## 2019-10-08 PROCEDURE — 97110 THERAPEUTIC EXERCISES: CPT | Mod: GP

## 2019-10-11 ENCOUNTER — HOSPITAL ENCOUNTER (OUTPATIENT)
Dept: PHYSICAL THERAPY | Facility: HOSPITAL | Age: 69
Setting detail: THERAPIES SERIES
End: 2019-10-11
Attending: FAMILY MEDICINE
Payer: MEDICARE

## 2019-10-11 PROCEDURE — 97110 THERAPEUTIC EXERCISES: CPT | Mod: GP

## 2019-10-15 ENCOUNTER — HOSPITAL ENCOUNTER (OUTPATIENT)
Dept: PHYSICAL THERAPY | Facility: HOSPITAL | Age: 69
Setting detail: THERAPIES SERIES
End: 2019-10-15
Attending: FAMILY MEDICINE
Payer: MEDICARE

## 2019-10-15 PROCEDURE — 97110 THERAPEUTIC EXERCISES: CPT | Mod: GP

## 2019-10-18 ENCOUNTER — HOSPITAL ENCOUNTER (OUTPATIENT)
Dept: PHYSICAL THERAPY | Facility: HOSPITAL | Age: 69
Setting detail: THERAPIES SERIES
End: 2019-10-18
Attending: FAMILY MEDICINE
Payer: MEDICARE

## 2019-10-18 PROCEDURE — 97110 THERAPEUTIC EXERCISES: CPT | Mod: GP

## 2019-10-22 ENCOUNTER — HOSPITAL ENCOUNTER (OUTPATIENT)
Dept: PHYSICAL THERAPY | Facility: HOSPITAL | Age: 69
Setting detail: THERAPIES SERIES
End: 2019-10-22
Attending: FAMILY MEDICINE
Payer: MEDICARE

## 2019-10-22 PROCEDURE — 97110 THERAPEUTIC EXERCISES: CPT | Mod: GP

## 2019-10-25 ENCOUNTER — HOSPITAL ENCOUNTER (OUTPATIENT)
Dept: PHYSICAL THERAPY | Facility: HOSPITAL | Age: 69
Setting detail: THERAPIES SERIES
End: 2019-10-25
Attending: FAMILY MEDICINE
Payer: MEDICARE

## 2019-10-25 PROCEDURE — 97110 THERAPEUTIC EXERCISES: CPT | Mod: GP

## 2019-10-28 ENCOUNTER — HOSPITAL ENCOUNTER (OUTPATIENT)
Dept: PHYSICAL THERAPY | Facility: HOSPITAL | Age: 69
Setting detail: THERAPIES SERIES
End: 2019-10-28
Attending: FAMILY MEDICINE
Payer: MEDICARE

## 2019-10-28 PROCEDURE — 97110 THERAPEUTIC EXERCISES: CPT | Mod: GP

## 2019-10-30 ENCOUNTER — HOSPITAL ENCOUNTER (OUTPATIENT)
Dept: PHYSICAL THERAPY | Facility: HOSPITAL | Age: 69
Setting detail: THERAPIES SERIES
End: 2019-10-30
Attending: FAMILY MEDICINE
Payer: MEDICARE

## 2019-10-30 PROCEDURE — 97110 THERAPEUTIC EXERCISES: CPT | Mod: GP

## 2019-11-04 ENCOUNTER — HOSPITAL ENCOUNTER (OUTPATIENT)
Dept: PHYSICAL THERAPY | Facility: HOSPITAL | Age: 69
Setting detail: THERAPIES SERIES
End: 2019-11-04
Attending: FAMILY MEDICINE
Payer: MEDICARE

## 2019-11-04 PROCEDURE — 97110 THERAPEUTIC EXERCISES: CPT | Mod: GP

## 2019-11-06 ENCOUNTER — HOSPITAL ENCOUNTER (OUTPATIENT)
Dept: PHYSICAL THERAPY | Facility: HOSPITAL | Age: 69
Setting detail: THERAPIES SERIES
End: 2019-11-06
Attending: FAMILY MEDICINE
Payer: MEDICARE

## 2019-11-06 PROCEDURE — 97110 THERAPEUTIC EXERCISES: CPT | Mod: GP

## 2019-11-11 ENCOUNTER — HOSPITAL ENCOUNTER (OUTPATIENT)
Dept: PHYSICAL THERAPY | Facility: HOSPITAL | Age: 69
Setting detail: THERAPIES SERIES
End: 2019-11-11
Attending: FAMILY MEDICINE
Payer: MEDICARE

## 2019-11-11 PROCEDURE — 97110 THERAPEUTIC EXERCISES: CPT | Mod: GP

## 2019-11-13 ENCOUNTER — HOSPITAL ENCOUNTER (OUTPATIENT)
Dept: PHYSICAL THERAPY | Facility: HOSPITAL | Age: 69
Setting detail: THERAPIES SERIES
Discharge: HOME | End: 2019-11-13
Attending: FAMILY MEDICINE
Payer: MEDICARE

## 2019-11-13 PROCEDURE — 97110 THERAPEUTIC EXERCISES: CPT | Mod: GP

## 2019-11-19 ENCOUNTER — HOSPITAL ENCOUNTER (OUTPATIENT)
Dept: PHYSICAL THERAPY | Facility: HOSPITAL | Age: 69
Setting detail: THERAPIES SERIES
End: 2019-11-19
Attending: FAMILY MEDICINE
Payer: MEDICARE

## 2019-11-19 PROCEDURE — 97110 THERAPEUTIC EXERCISES: CPT | Mod: GP

## 2019-11-22 ENCOUNTER — HOSPITAL ENCOUNTER (OUTPATIENT)
Dept: PHYSICAL THERAPY | Facility: HOSPITAL | Age: 69
Setting detail: THERAPIES SERIES
End: 2019-11-22
Attending: FAMILY MEDICINE
Payer: MEDICARE

## 2019-11-22 PROCEDURE — 97110 THERAPEUTIC EXERCISES: CPT | Mod: GP

## 2019-11-22 PROCEDURE — 97035 APP MDLTY 1+ULTRASOUND EA 15: CPT | Mod: GP

## 2019-11-26 ENCOUNTER — HOSPITAL ENCOUNTER (OUTPATIENT)
Dept: PHYSICAL THERAPY | Facility: HOSPITAL | Age: 69
Setting detail: THERAPIES SERIES
End: 2019-11-26
Attending: FAMILY MEDICINE
Payer: MEDICARE

## 2019-11-26 PROCEDURE — 97110 THERAPEUTIC EXERCISES: CPT | Mod: GP

## 2019-11-26 PROCEDURE — 97035 APP MDLTY 1+ULTRASOUND EA 15: CPT | Mod: GP

## 2019-12-06 ENCOUNTER — HOSPITAL ENCOUNTER (OUTPATIENT)
Dept: PHYSICAL THERAPY | Facility: HOSPITAL | Age: 69
Setting detail: THERAPIES SERIES
End: 2019-12-06
Attending: FAMILY MEDICINE
Payer: MEDICARE

## 2019-12-06 PROCEDURE — 97110 THERAPEUTIC EXERCISES: CPT | Mod: GP

## 2019-12-16 ENCOUNTER — TRANSFERRED RECORDS (OUTPATIENT)
Dept: HEALTH INFORMATION MANAGEMENT | Facility: CLINIC | Age: 69
End: 2019-12-16

## 2019-12-16 LAB
ALT SERPL-CCNC: 36 U/L (ref 18–65)
AST SERPL-CCNC: 24 U/L (ref 10–40)
CHOLEST SERPL-MCNC: 163 MG/DL
CREAT SERPL-MCNC: 0.85 MG/DL (ref 0.8–1.5)
GFR SERPL CREATININE-BSD FRML MDRD: >60 ML/MIN/1.73M2
GLUCOSE SERPL-MCNC: 128 MG/DL (ref 60–99)
HDLC SERPL-MCNC: 30 MG/DL
LDLC SERPL CALC-MCNC: 99 MG/DL
POTASSIUM SERPL-SCNC: 4.7 MEQ/L (ref 3.5–5.1)
TRIGL SERPL-MCNC: 168 MG/DL

## 2020-01-29 ENCOUNTER — HOSPITAL ENCOUNTER (OUTPATIENT)
Dept: EDUCATION SERVICES | Facility: HOSPITAL | Age: 70
Discharge: HOME | End: 2020-01-29
Attending: NURSE PRACTITIONER | Admitting: FAMILY MEDICINE
Payer: MEDICARE

## 2020-01-29 VITALS
SYSTOLIC BLOOD PRESSURE: 134 MMHG | HEART RATE: 80 BPM | WEIGHT: 268.7 LBS | RESPIRATION RATE: 20 BRPM | OXYGEN SATURATION: 95 % | HEIGHT: 70 IN | BODY MASS INDEX: 38.47 KG/M2 | DIASTOLIC BLOOD PRESSURE: 86 MMHG

## 2020-01-29 DIAGNOSIS — E11.9 TYPE 2 DIABETES MELLITUS WITHOUT COMPLICATION, WITHOUT LONG-TERM CURRENT USE OF INSULIN (H): Primary | ICD-10-CM

## 2020-01-29 LAB — HBA1C MFR BLD: 6.9 % (ref 0–5.7)

## 2020-01-29 PROCEDURE — G0108 DIAB MANAGE TRN  PER INDIV: HCPCS | Performed by: DIETITIAN, REGISTERED

## 2020-01-29 PROCEDURE — 36416 COLLJ CAPILLARY BLOOD SPEC: CPT | Performed by: DIETITIAN, REGISTERED

## 2020-01-29 PROCEDURE — 83036 HEMOGLOBIN GLYCOSYLATED A1C: CPT | Performed by: DIETITIAN, REGISTERED

## 2020-01-29 ASSESSMENT — ANXIETY QUESTIONNAIRES
7. FEELING AFRAID AS IF SOMETHING AWFUL MIGHT HAPPEN: NOT AT ALL
1. FEELING NERVOUS, ANXIOUS, OR ON EDGE: SEVERAL DAYS
3. WORRYING TOO MUCH ABOUT DIFFERENT THINGS: MORE THAN HALF THE DAYS
6. BECOMING EASILY ANNOYED OR IRRITABLE: SEVERAL DAYS
IF YOU CHECKED OFF ANY PROBLEMS ON THIS QUESTIONNAIRE, HOW DIFFICULT HAVE THESE PROBLEMS MADE IT FOR YOU TO DO YOUR WORK, TAKE CARE OF THINGS AT HOME, OR GET ALONG WITH OTHER PEOPLE: NOT DIFFICULT AT ALL
5. BEING SO RESTLESS THAT IT IS HARD TO SIT STILL: NOT AT ALL
GAD7 TOTAL SCORE: 6
2. NOT BEING ABLE TO STOP OR CONTROL WORRYING: SEVERAL DAYS

## 2020-01-29 ASSESSMENT — MIFFLIN-ST. JEOR: SCORE: 1991.1

## 2020-01-29 ASSESSMENT — PATIENT HEALTH QUESTIONNAIRE - PHQ9
5. POOR APPETITE OR OVEREATING: SEVERAL DAYS
SUM OF ALL RESPONSES TO PHQ QUESTIONS 1-9: 5

## 2020-01-29 ASSESSMENT — PAIN SCALES - GENERAL: PAINLEVEL: NO PAIN (0)

## 2020-01-29 NOTE — PATIENT INSTRUCTIONS
-Keep following low carbohydrate meal plan.   -Try to begin some routine exercise - goal is 30 minutes most days of the week.  -Test glucose 1-2x/day.  Good times are fasting and 2 hours after a meal.  -Target levels are fasting , 2 hours after a meal less than 180.  -A1c today was 6.9% which is up from 6.2% at last check in July.    -Keep taking your same dose of Trulicity.   -Follow up in three months.  -Call with any concerns - PATRIZIA Short, -[phone].

## 2020-01-29 NOTE — PROGRESS NOTES
"Diabetes Self-Management Education & Support    Diabetes Education Self Management & Training    SUBJECTIVE/OBJECTIVE:  Presents for: Individual review  Accompanied by: Spouse  Diabetes education in the past 24mo: Yes  Focus of Visit: Reducing Risks, Monitoring, Assistance w/ making life changes  Diabetes type: Type 2  Date of diagnosis: 2008  Disease course: Stable  How confident are you filling out medical forms by yourself:: Somewhat  Diabetes management related comments/concerns: No concerns  Transportation concerns: No  Other concerns:: None  Cultural Influences/Ethnic Background:  American    Diabetes Symptoms & Complications  Blurred vision: No  Fatigue: No  Neuropathy: No  Foot ulcerations: No  Polydipsia: No  Polyphagia: No  Polyuria: No  Visual change: No  Weakness: No  Weight loss: No  Slow healing wounds: No  Recent Infection(s): No  Other: Yes(Had shoulder surgery in August 2019 with 15 weeks therapy)  Symptom course: Stable  Weight trend: Stable  Autonomic neuropathy: No  CVA: No  Heart disease: No  Nephropathy: No  Peripheral neuropathy: No  Peripheral Vascular Disease: No  Retinopathy: No    Patient Problem List and Family Medical History reviewed for relevant medical history, current medical status, and diabetes risk factors.    Vitals:  /86   Pulse 80   Resp 20   Ht 1.772 m (5' 9.75\")   Wt 121.9 kg (268 lb 11.2 oz)   SpO2 95%   BMI 38.83 kg/m    Estimated body mass index is 38.83 kg/m  as calculated from the following:    Height as of this encounter: 1.772 m (5' 9.75\").    Weight as of this encounter: 121.9 kg (268 lb 11.2 oz).   Last 3 BP:   BP Readings from Last 3 Encounters:   01/29/20 134/86   07/29/19 140/91   04/26/19 150/97       History   Smoking Status     Former Smoker   Smokeless Tobacco     Never Used       Labs:  Lab Results   Component Value Date    A1C 6.9 01/29/2020     Lab Results   Component Value Date     09/17/2018     Lab Results   Component Value Date    LDL " 104 2018     HDL Cholesterol   Date Value Ref Range Status   2018 32 (L) >=60 mg/dL Final   ]  GFR Estimate   Date Value Ref Range Status   2018 >60 ml/min/1.73m2 Final     GFR Estimate If Black   Date Value Ref Range Status   2017 >90   GFR Calc   >60 mL/min/1.7m2 Final     Lab Results   Component Value Date    CR 0.86 2018     No results found for: MICROALBUMIN    Healthy Eating  Healthy Eating Assessed Today: Yes  Cultural/Nondenominational diet restrictions?: No  Patient on a regular basis: Eats 3 meals a day  Meal planning: Avoiding sweets  Meals include: Breakfast, Lunch, Dinner, Snacks  Breakfast: 2 slices bread - butter, 2 slices ham, 3-4 cups black coffee  Lunch: sandwich, coffee or crystal light   Dinner: meat, potatoes, green beans or salad - diet pepsi or milk   Snacks: sugar free cookie or sugar free candy, popcorn   Beverages: Water, Milk, Coffee, Diet soda(Crystal Lite)  Has patient met with a dietitian in the past?: Yes    Being Active  Being Active Assessed Today: No  Exercise:: Currently not exercising(Has treadmill )  Barrier to exercise: Physical limitation    Monitoring  Monitoring Assessed Today: Yes  Did patient bring glucose meter to appointment? : Yes(strips are )  Blood Glucose Meter: Accu-check  Home Glucose (Sugar) Monitorin-2 times per day  Blood glucose trend: No change  [id number]  2 hours post [id number]  Two week average is 162    Taking Medications  Diabetes Medication(s)     Incretin Mimetic Agents (GLP-1 Receptor Agonists)       dulaglutide (TRULICITY) 0.75 MG/0.5ML pen    Inject Subcutaneous once a week        Taking Medication Assessed Today: Yes  Current Treatments: Non-insulin Injectables, Diet(Trulicity 0.75 mg weekly.)  Problems taking diabetes medications regularly?: No  Diabetes medication side effects?: No  Treatment Compliance: All of the time    Problem Solving  Problem Solving Assessed Today: Yes  Hypoglycemia  Frequency: Rarely  Hypoglycemia Treatment: Other food    Reducing Risks  Reducing Risks Assessed Today: No  Diabetes Risks: Age over 45 years, Sedentary Lifestyle, Family History  CAD Risks: Diabetes Mellitus, Male sex, Obesity, Family history, Sedentary lifestyle, Stress  Has dilated eye exam at least once a year?: Yes  Sees dentist every 6 months?: Yes  Sees podiatrist (foot doctor)?: No    Healthy Coping  Healthy Coping Assessed Today: Yes  Emotional response to diabetes: Ready to learn, Concern for health and well-being  Informal Support system:: Spouse  Stage of change: ACTION (Actively working towards change)  Difficulty affording diabetes management supplies?: No  Support resources: None  Patient Activation Measure Survey Score:  No flowsheet data found.    ASSESSMENT:  A1c today was 6.9% which is up slightly from last A1c of 6.2% in July.  Pt has not been exercising since he had his shoulder surgery in August but feels he could use his treadmill again now and seems willing.  He continues to limit foods high in carbohydrates.  His wife is present today and she is supportive.      Goals Addressed as of 1/29/2020                 Today    7/29/19       Patient Stated      Healthy Eating (pt-stated)   50%  On track    Added 12/2/18 by Ria Morillo RD     My Goal: I will follow carbohydrate counting meal plan.     What I need to meet my goal: meal planning education, written meal plan.     I plan to meet my goal by this date: next visit.           Patient's most recent   Lab Results   Component Value Date    A1C 6.9 01/29/2020    is meeting goal of <7.0    INTERVENTION:   Diabetes knowledge and skills assessment:     Patient is knowledgeable in diabetes management concepts related to: Healthy Eating, Being Active, Monitoring, Taking Medication, Problem Solving, Reducing Risks and Healthy Coping    Patient needs further education on the following diabetes management concepts: No concerns today.  Pt is doing well.      Based on learning assessment above, most appropriate setting for further diabetes education would be: Individual setting.    Education provided today on:  AADE Self-Care Behaviors:  Being Active: relationship to blood glucose  Reducing Risks: major complications of diabetes and A1C - goals, relating to blood glucose levels, how often to check    Opportunities for ongoing education and support in diabetes-self management were discussed.    Pt verbalized understanding of concepts discussed and recommendations provided today.       Education Materials Provided:  No new materials today.     PLAN:  Continue efforts to follow low carbohydrate meal plan.   Begin exercising again.  Work towards 30 minutes most days of the week.  Test glucose 1-2x/day - alternate times.   Keep taking current dose of Trulicity.    See Patient Instructions for co-developed, patient-stated behavior change goals.  AVS printed and provided to patient today.   Follow up in three months.     Time Spent: 45 minutes  Encounter Type: Individual    Any diabetes medication dose changes were made via the CDE Protocol and Collaborative Practice Agreement with the patient's referring provider. A copy of this encounter was shared with the provider.

## 2020-01-29 NOTE — LETTER
"    1/29/2020        RE: Donis Watt  [address]  [facility] [address]        Diabetes Self-Management Education & Support    Diabetes Education Self Management & Training    SUBJECTIVE/OBJECTIVE:  Presents for: Individual review  Accompanied by: Spouse  Diabetes education in the past 24mo: Yes  Focus of Visit: Reducing Risks, Monitoring, Assistance w/ making life changes  Diabetes type: Type 2  Date of diagnosis: 2008  Disease course: Stable  How confident are you filling out medical forms by yourself:: Somewhat  Diabetes management related comments/concerns: No concerns  Transportation concerns: No  Other concerns:: None  Cultural Influences/Ethnic Background:  American    Diabetes Symptoms & Complications  Blurred vision: No  Fatigue: No  Neuropathy: No  Foot ulcerations: No  Polydipsia: No  Polyphagia: No  Polyuria: No  Visual change: No  Weakness: No  Weight loss: No  Slow healing wounds: No  Recent Infection(s): No  Other: Yes(Had shoulder surgery in August 2019 with 15 weeks therapy)  Symptom course: Stable  Weight trend: Stable  Autonomic neuropathy: No  CVA: No  Heart disease: No  Nephropathy: No  Peripheral neuropathy: No  Peripheral Vascular Disease: No  Retinopathy: No    Patient Problem List and Family Medical History reviewed for relevant medical history, current medical status, and diabetes risk factors.    Vitals:  /86   Pulse 80   Resp 20   Ht 1.772 m (5' 9.75\")   Wt 121.9 kg (268 lb 11.2 oz)   SpO2 95%   BMI 38.83 kg/m     Estimated body mass index is 38.83 kg/m  as calculated from the following:    Height as of this encounter: 1.772 m (5' 9.75\").    Weight as of this encounter: 121.9 kg (268 lb 11.2 oz).   Last 3 BP:   BP Readings from Last 3 Encounters:   01/29/20 134/86   07/29/19 140/91   04/26/19 150/97       History   Smoking Status     Former Smoker   Smokeless Tobacco     Never Used       Labs:  Lab Results   Component Value Date    A1C 6.9 01/29/2020     Lab Results "   Component Value Date     2018     Lab Results   Component Value Date     2018     HDL Cholesterol   Date Value Ref Range Status   2018 32 (L) >=60 mg/dL Final   ]  GFR Estimate   Date Value Ref Range Status   2018 >60 ml/min/1.73m2 Final     GFR Estimate If Black   Date Value Ref Range Status   2017 >90   GFR Calc   >60 mL/min/1.7m2 Final     Lab Results   Component Value Date    CR 0.86 2018     No results found for: MICROALBUMIN    Healthy Eating  Healthy Eating Assessed Today: Yes  Cultural/Buddhism diet restrictions?: No  Patient on a regular basis: Eats 3 meals a day  Meal planning: Avoiding sweets  Meals include: Breakfast, Lunch, Dinner, Snacks  Breakfast: 2 slices bread - butter, 2 slices ham, 3-4 cups black coffee  Lunch: sandwich, coffee or crystal light   Dinner: meat, potatoes, green beans or salad - diet pepsi or milk   Snacks: sugar free cookie or sugar free candy, popcorn   Beverages: Water, Milk, Coffee, Diet soda(Crystal Lite)  Has patient met with a dietitian in the past?: Yes    Being Active  Being Active Assessed Today: No  Exercise:: Currently not exercising(Has treadmill )  Barrier to exercise: Physical limitation    Monitoring  Monitoring Assessed Today: Yes  Did patient bring glucose meter to appointment? : Yes(strips are )  Blood Glucose Meter: Accu-check  Home Glucose (Sugar) Monitorin-2 times per day  Blood glucose trend: No change  [id number]  2 hours post [id number]  Two week average is 162    Taking Medications  Diabetes Medication(s)     Incretin Mimetic Agents (GLP-1 Receptor Agonists)       dulaglutide (TRULICITY) 0.75 MG/0.5ML pen    Inject Subcutaneous once a week        Taking Medication Assessed Today: Yes  Current Treatments: Non-insulin Injectables, Diet(Trulicity 0.75 mg weekly.)  Problems taking diabetes medications regularly?: No  Diabetes medication side effects?: No  Treatment  Compliance: All of the time    Problem Solving  Problem Solving Assessed Today: Yes  Hypoglycemia Frequency: Rarely  Hypoglycemia Treatment: Other food    Reducing Risks  Reducing Risks Assessed Today: No  Diabetes Risks: Age over 45 years, Sedentary Lifestyle, Family History  CAD Risks: Diabetes Mellitus, Male sex, Obesity, Family history, Sedentary lifestyle, Stress  Has dilated eye exam at least once a year?: Yes  Sees dentist every 6 months?: Yes  Sees podiatrist (foot doctor)?: No    Healthy Coping  Healthy Coping Assessed Today: Yes  Emotional response to diabetes: Ready to learn, Concern for health and well-being  Informal Support system:: Spouse  Stage of change: ACTION (Actively working towards change)  Difficulty affording diabetes management supplies?: No  Support resources: None  Patient Activation Measure Survey Score:  No flowsheet data found.    ASSESSMENT:  A1c today was 6.9% which is up slightly from last A1c of 6.2% in July.  Pt has not been exercising since he had his shoulder surgery in August but feels he could use his treadmill again now and seems willing.  He continues to limit foods high in carbohydrates.  His wife is present today and she is supportive.      Goals Addressed as of 1/29/2020                 Today    7/29/19       Patient Stated      Healthy Eating (pt-stated)   50%  On track    Added 12/2/18 by Ria Morillo RD     My Goal: I will follow carbohydrate counting meal plan.     What I need to meet my goal: meal planning education, written meal plan.     I plan to meet my goal by this date: next visit.           Patient's most recent   Lab Results   Component Value Date    A1C 6.9 01/29/2020    is meeting goal of <7.0    INTERVENTION:   Diabetes knowledge and skills assessment:     Patient is knowledgeable in diabetes management concepts related to: Healthy Eating, Being Active, Monitoring, Taking Medication, Problem Solving, Reducing Risks and Healthy Coping    Patient needs  further education on the following diabetes management concepts: No concerns today.  Pt is doing well.     Based on learning assessment above, most appropriate setting for further diabetes education would be: Individual setting.    Education provided today on:  AADE Self-Care Behaviors:  Being Active: relationship to blood glucose  Reducing Risks: major complications of diabetes and A1C - goals, relating to blood glucose levels, how often to check    Opportunities for ongoing education and support in diabetes-self management were discussed.    Pt verbalized understanding of concepts discussed and recommendations provided today.       Education Materials Provided:  No new materials today.     PLAN:  Continue efforts to follow low carbohydrate meal plan.   Begin exercising again.  Work towards 30 minutes most days of the week.  Test glucose 1-2x/day - alternate times.   Keep taking current dose of Trulicity.    See Patient Instructions for co-developed, patient-stated behavior change goals.  AVS printed and provided to patient today.   Follow up in three months.     Time Spent: 45 minutes  Encounter Type: Individual    Any diabetes medication dose changes were made via the CDE Protocol and Collaborative Practice Agreement with the patient's referring provider. A copy of this encounter was shared with the provider.        Sincerely,        Ria Morillo RD

## 2020-01-30 ASSESSMENT — ANXIETY QUESTIONNAIRES: GAD7 TOTAL SCORE: 6

## 2020-03-30 NOTE — PROGRESS NOTES
Outpatient Physical Therapy Discharge Note     Patient: Donis Watt  : 1950    Beginning/End Dates of Reporting Period:  19 to 19    Referring Provider: Dr. Silva    Therapy Diagnosis: s/p L RCR     Client Self Report: Feeling good. Ready to be finished with PT for now      Goals:  Goal Identifier STG 1   Goal Description Pt will demonstrate knowledge/understanding of HEP and report daily compliance   Target Date 10/02/19   Date Met      Progress:     Goal Identifier LTG 1   Goal Description Pt will use LUE during all daily activities without being limited by pain   Target Date 19   Date Met      Progress:     Goal Identifier LTG 2   Goal Description Pt will use LUE during all daily activities without being limited by weakness or impaired ROM   Target Date 19   Date Met      Progress:     Goal Identifier     Goal Description     Target Date     Date Met      Progress:     Goal Identifier     Goal Description     Target Date     Date Met      Progress:     Goal Identifier     Goal Description     Target Date     Date Met      Progress:     Goal Identifier     Goal Description     Target Date     Date Met      Progress:     Goal Identifier     Goal Description     Target Date     Date Met      Progress:     Progress Toward Goals:   Progress this reporting period: Pt chooses to discontinue PT at this time. Will continue with HEP    Plan:  Discharge from therapy.    Discharge:    Reason for Discharge: Patient chooses to discontinue therapy.    Equipment Issued:     Discharge Plan: Patient to continue home program.

## 2020-06-13 ENCOUNTER — HOSPITAL ENCOUNTER (EMERGENCY)
Facility: HOSPITAL | Age: 70
Discharge: HOME | End: 2020-06-13
Attending: NURSE PRACTITIONER | Admitting: NURSE PRACTITIONER
Payer: MEDICARE

## 2020-06-13 VITALS
OXYGEN SATURATION: 98 % | TEMPERATURE: 97.7 F | SYSTOLIC BLOOD PRESSURE: 178 MMHG | RESPIRATION RATE: 16 BRPM | DIASTOLIC BLOOD PRESSURE: 92 MMHG

## 2020-06-13 DIAGNOSIS — I10 HIGH BLOOD PRESSURE: ICD-10-CM

## 2020-06-13 PROCEDURE — G0463 HOSPITAL OUTPT CLINIC VISIT: HCPCS | Mod: 25

## 2020-06-13 PROCEDURE — 93005 ELECTROCARDIOGRAM TRACING: CPT

## 2020-06-13 PROCEDURE — 25000132 ZZH RX MED GY IP 250 OP 250 PS 637: Mod: GY | Performed by: NURSE PRACTITIONER

## 2020-06-13 PROCEDURE — 93010 ELECTROCARDIOGRAM REPORT: CPT | Performed by: INTERNAL MEDICINE

## 2020-06-13 PROCEDURE — 99213 OFFICE O/P EST LOW 20 MIN: CPT | Mod: Z6 | Performed by: NURSE PRACTITIONER

## 2020-06-13 RX ORDER — CLONIDINE HYDROCHLORIDE 0.1 MG/1
0.1 TABLET ORAL ONCE
Status: COMPLETED | OUTPATIENT
Start: 2020-06-13 | End: 2020-06-13

## 2020-06-13 RX ADMIN — CLONIDINE HYDROCHLORIDE 0.1 MG: 0.1 TABLET ORAL at 13:06

## 2020-06-13 ASSESSMENT — ENCOUNTER SYMPTOMS
DIARRHEA: 1
HEADACHES: 0
CHILLS: 0
SHORTNESS OF BREATH: 0
NAUSEA: 0
DIZZINESS: 0
LIGHT-HEADEDNESS: 0
VOMITING: 0
ACTIVITY CHANGE: 1
FEVER: 0

## 2020-06-13 NOTE — DISCHARGE INSTRUCTIONS
Return for further evaluation if you develop chest pain, shortness of breath, dizziness, lightheadedness, severe headaches. Weakness, blurry vision, difficulty speech. Or any other stroke like symptoms you need to return to ER or call 911.    Follow-up with primary care provider early next week.

## 2020-06-13 NOTE — ED AVS SNAPSHOT
[facility]  [address]  [place] [address]  Phone:  [phone]                                    Donis Watt   MRN: [id number]    Department:  [facility]   Date of Visit:  6/13/2020           After Visit Summary Signature Page    I have received my discharge instructions, and my questions have been answered. I have discussed any challenges I see with this plan with the nurse or doctor.    ..........................................................................................................................................  Patient/Patient Representative Signature      ..........................................................................................................................................  Patient Representative Print Name and Relationship to Patient    ..................................................               ................................................  Date                                   Time    ..........................................................................................................................................  Reviewed by Signature/Title    ...................................................              ..............................................  Date                                               Time          22EPIC Rev 08/18

## 2020-06-13 NOTE — ED TRIAGE NOTES
Presents for hypertension.  This morning bp was 198/111 two hours after his blood pressure medications.  Here it is 184/90 with our equipment.

## 2020-06-13 NOTE — ED PROVIDER NOTES
History     Chief Complaint   Patient presents with     Hypertension     concerned about hypertension, bp in triage 184/90. notes takes bp meds     HPI  Donis Watt is a 70 year old male who presents with an elevated BP. His BP at home this morning was 198/111. He states in the morning his BP is always high (180's over 100's). He states last night his /84. Normally he runs 140-150 over 80's. His ramipril has been increased in the past six months from 7.5 to 10 mg that he takes in twice daily divided doses. His blood sugars have been running 150's and his last A1c was 7, down from 9. He has had a lot of stress in the past two years with the passing of two sisters. He smoked for 20 years and quit 25 years ago. He is on a medication for stress but can not remember the name of the medication.Has a history of hypertension and DMII.  Denies chest pain, dizziness, light headedness, headaches, blurry vision, nausea, vomiting, diarrhea, and shortness of breath.    Allergies:  Allergies   Allergen Reactions     Codeine Anxiety     hyperactivity       No Clinical Screening - See Comments Anxiety     All Narcotics give him hyperactivity.        Problem List:    Patient Active Problem List    Diagnosis Date Noted     Type 2 diabetes mellitus without complication, without long-term current use of insulin (H) 04/26/2019     Priority: Medium        Past Medical History:    History reviewed. No pertinent past medical history.    Past Surgical History:    History reviewed. No pertinent surgical history.    Family History:    No family history on file.    Social History:  Marital Status:   [2]  Social History     Tobacco Use     Smoking status: Former Smoker     Smokeless tobacco: Never Used   Substance Use Topics     Alcohol use: Yes     Comment: social     Drug use: No        Medications:    aspirin 81 MG tablet  dulaglutide (TRULICITY) 0.75 MG/0.5ML pen  FINASTERIDE PO  PANTOPRAZOLE SODIUM PO  RAMIPRIL  PO  Rosuvastatin Calcium (CRESTOR PO)  tamsulosin (FLOMAX) 0.4 MG 24 hr capsule  VITAMIN D, CHOLECALCIFEROL, PO  blood glucose (ONETOUCH VERIO IQ) test strip  blood glucose monitoring (ONE TOUCH DELICA) lancets          Review of Systems   Constitutional: Positive for activity change. Negative for chills and fever.   Eyes: Negative for visual disturbance.   Respiratory: Negative for shortness of breath.    Cardiovascular: Positive for leg swelling (bilaterally, chronic). Negative for chest pain.   Gastrointestinal: Positive for diarrhea (periodically, from diabetic candies.). Negative for nausea and vomiting.   Neurological: Negative for dizziness, light-headedness and headaches.       Physical Exam   BP: (!) 184/90  Heart Rate: 76  Temp: 97.7  F (36.5  C)  Resp: 16  SpO2: 98 %      Physical Exam  Vitals signs and nursing note reviewed.   Constitutional:       General: He is not in acute distress.     Appearance: He is overweight.   HENT:      Head: Normocephalic.   Cardiovascular:      Rate and Rhythm: Normal rate and regular rhythm.      Heart sounds: Normal heart sounds. No murmur.   Pulmonary:      Effort: Pulmonary effort is normal. No respiratory distress.      Breath sounds: Normal breath sounds. No wheezing.   Abdominal:      General: Abdomen is protuberant.   Skin:     General: Skin is dry.   Neurological:      Mental Status: He is alert and oriented to person, place, and time.      GCS: GCS eye subscore is 4. GCS verbal subscore is 5. GCS motor subscore is 6.      Cranial Nerves: Cranial nerves are intact.      Motor: Motor function is intact.      Coordination: Coordination is intact.      Gait: Gait is intact.   Psychiatric:         Behavior: Behavior normal.         ED Course        Procedures           No results found for this or any previous visit (from the past 24 hour(s)).    Medications   cloNIDine (CATAPRES) tablet 0.1 mg (0.1 mg Oral Given 6/13/20 1306)       Assessments & Plan (with Medical  Decision Making)     I have reviewed the nursing notes.    I have reviewed the findings, diagnosis, plan and need for follow up with the patient.  (I10) High blood pressure  Comment: 70 year old male who presents with an elevated BP. His BP at home this morning was 198/111. He states in the morning his BP is always high (180's over 100's). He states last night his /84. Normally he runs 140-150 over 80's. His ramipril has been increased in the past six months from 7.5 to 10 mg that he takes in twice daily divided doses. His blood sugars have been running 150's and his last A1c was 7, down from 9. He has had a lot of stress in the past two years with the passing of two sisters. He smoked for 20 years and quit 25 years ago. He is on a medication for stress but can not remember the name of the medication. Has a history of hypertension and DMII. Denies chest pain, dizziness, light headedness, headaches, blurry vision, nausea, vomiting, diarrhea, and shortness of breath.    Assessment: irregular heart tones. No murmur. Lung fields CTA.     ECG basically unchanged except for he is having  PAC's   Dr. Huynh did look at the ECG..    Plan: education provided for hypertension, stroke and heart attack  Return for further evaluation if you develop chest pain, shortness of breath, dizziness, lightheadedness, severe headaches. Weakness, blurry vision, difficulty speech. Or any other stroke like symptoms you need to return to ER or call 911.  Follow-up with primary care provider early next week.  These discharge instructions and medications were reviewed with him and understanding verbalized.    Discharge Medication List as of 6/13/2020  1:25 PM          Final diagnoses:   High blood pressure       6/13/2020   [facility]       Alessia Dudley, CNP  06/14/20 [id number]

## 2020-06-14 ENCOUNTER — HOSPITAL ENCOUNTER (EMERGENCY)
Facility: HOSPITAL | Age: 70
Discharge: HOME | End: 2020-06-14
Attending: PHYSICIAN ASSISTANT | Admitting: PHYSICIAN ASSISTANT
Payer: MEDICARE

## 2020-06-14 VITALS
OXYGEN SATURATION: 97 % | DIASTOLIC BLOOD PRESSURE: 105 MMHG | RESPIRATION RATE: 18 BRPM | SYSTOLIC BLOOD PRESSURE: 165 MMHG | TEMPERATURE: 96.8 F | HEART RATE: 75 BPM

## 2020-06-14 DIAGNOSIS — I10 ESSENTIAL HYPERTENSION: ICD-10-CM

## 2020-06-14 PROCEDURE — 99213 OFFICE O/P EST LOW 20 MIN: CPT | Mod: Z6 | Performed by: PHYSICIAN ASSISTANT

## 2020-06-14 PROCEDURE — G0463 HOSPITAL OUTPT CLINIC VISIT: HCPCS

## 2020-06-14 RX ORDER — MIRTAZAPINE 15 MG/1
15 TABLET, FILM COATED ORAL AT BEDTIME
COMMUNITY
End: 2021-06-16

## 2020-06-14 RX ORDER — HYDROCHLOROTHIAZIDE 12.5 MG/1
12.5 TABLET ORAL DAILY
Qty: 14 TABLET | Refills: 0 | Status: SHIPPED | OUTPATIENT
Start: 2020-06-14 | End: 2022-03-21

## 2020-06-14 NOTE — ED PROVIDER NOTES
History     Chief Complaint   Patient presents with     Hypertension     states continues to have high blood pressure and was seen in  yesterday. States checks blood pressure at home with wrist monitor. States unsure when he will be able to get in to see PCP to have blood pressure medication changed. States oral medication he received in  yesterday worked very well and he wishes he would have asked for a prescription for it.      HPI  Donis Watt is a 70 year old male who presents with complaints of uncontrolled hypertension.  Patient has been watching his Bps closely as his father  of heart disease and mother and two sisters  of strokes.  Patient's wife is very anxious about him having a stroke.  Was seen yesterday and given clonidine for BP.  BP measured this morning was again in 200s systolic at home.  Patient currently takes 5 mg ramipril twice daily.    Allergies:  Allergies   Allergen Reactions     Codeine Anxiety     hyperactivity       No Clinical Screening - See Comments Anxiety     All Narcotics give him hyperactivity.        Problem List:    Patient Active Problem List    Diagnosis Date Noted     Type 2 diabetes mellitus without complication, without long-term current use of insulin (H) 2019     Priority: Medium        Past Medical History:    No past medical history on file.    Social History:  Marital Status:   [2]  Social History     Tobacco Use     Smoking status: Former Smoker     Smokeless tobacco: Never Used   Substance Use Topics     Alcohol use: Yes     Comment: social     Drug use: No        Medications:    hydrochlorothiazide (HYDRODIURIL) 12.5 MG tablet  mirtazapine (REMERON) 15 MG tablet  aspirin 81 MG tablet  blood glucose (ONETOUCH VERIO IQ) test strip  blood glucose monitoring (ONE TOUCH DELICA) lancets  dulaglutide (TRULICITY) 0.75 MG/0.5ML pen  FINASTERIDE PO  PANTOPRAZOLE SODIUM PO  RAMIPRIL PO  Rosuvastatin Calcium (CRESTOR PO)  tamsulosin (FLOMAX) 0.4  MG 24 hr capsule  VITAMIN D, CHOLECALCIFEROL, PO          Review of Systems :  Card: Pos for elevated BP, neg for chest pain    Physical Exam   BP: (!) 165/105  Pulse: 75  Temp: 96.8  F (36  C)  Resp: 18  SpO2: 97 %    Physical Exam   Constitutional: Well-developed and well-nourished.   Head: Normocephalic and atraumatic.   Eyes: Conjunctivae and EOM are normal. Pupils are equal, round, and reactive to light.   Neck: Normal range of motion.   Pulmonary/Chest: Effort normal  Skin: Skin is warm and dry. No rash noted.   Neuro: Gait normal.    Psych: Tearful discussing family history of strokes.     ED Course               No results found for this or any previous visit (from the past 24 hour(s)).    Medications - No data to display    Assessments & Plan (with Medical Decision Making)     I have reviewed the nursing notes.    I have reviewed the findings, diagnosis, plan and need for follow up with the patient.  Discussed gradual reduction of BP to avoid hypotensive episodes.  Recommended increase ramipril dose to 7.5 mg twice daily.  Second prescription for hydrochlorothiazide provided if BP remains above 180 systolic.  Patient to f/u with Dr. London when able.  Will return to ED if systolic remains above 200 in spite of current interventions.         Medication List      Started    hydrochlorothiazide 12.5 MG tablet  Commonly known as:  HYDRODIURIL  12.5 mg, Oral, DAILY            Final diagnoses:   Essential hypertension       ELIZABETH Daniels on 6/14/2020 at 12:14 PM   6/14/2020   [facility]      Aubrey Villatoro PA  06/14/20 [id number]

## 2020-06-14 NOTE — ED AVS SNAPSHOT
[facility]  [address]  [place] [address]  Phone:  [phone]                                    Donis Watt   MRN: [id number]    Department:  [facility]   Date of Visit:  6/14/2020           After Visit Summary Signature Page    I have received my discharge instructions, and my questions have been answered. I have discussed any challenges I see with this plan with the nurse or doctor.    ..........................................................................................................................................  Patient/Patient Representative Signature      ..........................................................................................................................................  Patient Representative Print Name and Relationship to Patient    ..................................................               ................................................  Date                                   Time    ..........................................................................................................................................  Reviewed by Signature/Title    ...................................................              ..............................................  Date                                               Time          22EPIC Rev 08/18

## 2020-06-14 NOTE — ED TRIAGE NOTES
"Arrived with c/o high blood pressure again today. States he was seen in  yesterday and received a clonidine PO that really helped lower his blood pressure. States pressures are around 200 systolic at home with wrist monitor. Blood pressure 165/105 here in triage. Denies any chest pain, SOB, dizziness, headaches, nausea, vomiting, fevers, or any other issues. States \"I'm suppose to see my primary to get medication for this but I just don't know when that will happen, I wish I would have gotten a prescription of the pill I received yesterday.\"   "

## 2020-07-08 ENCOUNTER — TRANSFERRED RECORDS (OUTPATIENT)
Dept: HEALTH INFORMATION MANAGEMENT | Facility: CLINIC | Age: 70
End: 2020-07-08

## 2020-07-08 LAB — RETINOPATHY: NEGATIVE

## 2020-08-12 ENCOUNTER — TRANSFERRED RECORDS (OUTPATIENT)
Dept: HEALTH INFORMATION MANAGEMENT | Facility: CLINIC | Age: 70
End: 2020-08-12

## 2021-06-11 ENCOUNTER — TRANSFERRED RECORDS (OUTPATIENT)
Dept: HEALTH INFORMATION MANAGEMENT | Facility: CLINIC | Age: 71
End: 2021-06-11

## 2021-06-16 ENCOUNTER — HOSPITAL ENCOUNTER (OUTPATIENT)
Dept: EDUCATION SERVICES | Facility: HOSPITAL | Age: 71
Discharge: HOME | End: 2021-06-16
Attending: NURSE PRACTITIONER | Admitting: FAMILY MEDICINE
Payer: MEDICARE

## 2021-06-16 VITALS
OXYGEN SATURATION: 97 % | WEIGHT: 262 LBS | HEART RATE: 75 BPM | TEMPERATURE: 98.1 F | BODY MASS INDEX: 37.86 KG/M2 | DIASTOLIC BLOOD PRESSURE: 74 MMHG | SYSTOLIC BLOOD PRESSURE: 131 MMHG

## 2021-06-16 DIAGNOSIS — E11.65 TYPE 2 DIABETES MELLITUS WITH HYPERGLYCEMIA, WITHOUT LONG-TERM CURRENT USE OF INSULIN (H): Primary | ICD-10-CM

## 2021-06-16 DIAGNOSIS — E11.9 TYPE 2 DIABETES MELLITUS WITHOUT COMPLICATION, WITHOUT LONG-TERM CURRENT USE OF INSULIN (H): Primary | ICD-10-CM

## 2021-06-16 LAB
ANION GAP SERPL CALCULATED.3IONS-SCNC: 5 MMOL/L (ref 3–14)
BUN SERPL-MCNC: 17 MG/DL (ref 7–30)
CALCIUM SERPL-MCNC: 9 MG/DL (ref 8.5–10.1)
CHLORIDE SERPL-SCNC: 107 MMOL/L (ref 94–109)
CO2 SERPL-SCNC: 27 MMOL/L (ref 20–32)
CREAT SERPL-MCNC: 0.81 MG/DL (ref 0.66–1.25)
GFR SERPL CREATININE-BSD FRML MDRD: 90 ML/MIN/{1.73_M2}
GLUCOSE SERPL-MCNC: 190 MG/DL (ref 70–99)
HBA1C MFR BLD: 10.2 % (ref 0–5.7)
POTASSIUM SERPL-SCNC: 3.5 MMOL/L (ref 3.4–5.3)
SODIUM SERPL-SCNC: 139 MMOL/L (ref 133–144)

## 2021-06-16 PROCEDURE — 83036 HEMOGLOBIN GLYCOSYLATED A1C: CPT | Performed by: DIETITIAN, REGISTERED

## 2021-06-16 PROCEDURE — 80048 BASIC METABOLIC PNL TOTAL CA: CPT | Mod: ZL | Performed by: NURSE PRACTITIONER

## 2021-06-16 PROCEDURE — G0108 DIAB MANAGE TRN  PER INDIV: HCPCS | Performed by: DIETITIAN, REGISTERED

## 2021-06-16 PROCEDURE — 36415 COLL VENOUS BLD VENIPUNCTURE: CPT | Mod: ZL | Performed by: NURSE PRACTITIONER

## 2021-06-16 RX ORDER — BLOOD SUGAR DIAGNOSTIC
STRIP MISCELLANEOUS
Qty: 200 STRIP | Refills: 3 | Status: SHIPPED | OUTPATIENT
Start: 2021-06-16

## 2021-06-16 RX ORDER — DULAGLUTIDE 1.5 MG/.5ML
1.5 INJECTION, SOLUTION SUBCUTANEOUS
Qty: 2 ML | Refills: 0 | Status: SHIPPED | OUTPATIENT
Start: 2021-06-16 | End: 2021-07-07

## 2021-06-16 RX ORDER — LANCETS
EACH MISCELLANEOUS
Qty: 100 EACH | Refills: 3 | Status: SHIPPED | OUTPATIENT
Start: 2021-06-16

## 2021-06-16 RX ORDER — MIRTAZAPINE 15 MG/1
15 TABLET, FILM COATED ORAL AT BEDTIME
COMMUNITY

## 2021-06-16 RX ORDER — ROSUVASTATIN CALCIUM 10 MG/1
10 TABLET, COATED ORAL DAILY
COMMUNITY

## 2021-06-16 RX ORDER — FINASTERIDE 5 MG/1
5 TABLET, FILM COATED ORAL DAILY
COMMUNITY

## 2021-06-16 RX ORDER — TAMSULOSIN HYDROCHLORIDE 0.4 MG/1
0.4 CAPSULE ORAL DAILY
COMMUNITY

## 2021-06-16 ASSESSMENT — ANXIETY QUESTIONNAIRES
1. FEELING NERVOUS, ANXIOUS, OR ON EDGE: SEVERAL DAYS
3. WORRYING TOO MUCH ABOUT DIFFERENT THINGS: SEVERAL DAYS
7. FEELING AFRAID AS IF SOMETHING AWFUL MIGHT HAPPEN: NOT AT ALL
6. BECOMING EASILY ANNOYED OR IRRITABLE: MORE THAN HALF THE DAYS
IF YOU CHECKED OFF ANY PROBLEMS ON THIS QUESTIONNAIRE, HOW DIFFICULT HAVE THESE PROBLEMS MADE IT FOR YOU TO DO YOUR WORK, TAKE CARE OF THINGS AT HOME, OR GET ALONG WITH OTHER PEOPLE: NOT DIFFICULT AT ALL
GAD7 TOTAL SCORE: 6
5. BEING SO RESTLESS THAT IT IS HARD TO SIT STILL: NOT AT ALL
2. NOT BEING ABLE TO STOP OR CONTROL WORRYING: SEVERAL DAYS

## 2021-06-16 ASSESSMENT — PATIENT HEALTH QUESTIONNAIRE - PHQ9
SUM OF ALL RESPONSES TO PHQ QUESTIONS 1-9: 2
5. POOR APPETITE OR OVEREATING: SEVERAL DAYS

## 2021-06-16 ASSESSMENT — PAIN SCALES - GENERAL: PAINLEVEL: NO PAIN (0)

## 2021-06-16 NOTE — LETTER
"    6/16/2021        RE: Donis Watt  [address]  [facility] [address]        Diabetes Self-Management Education & Support    Presents for: Individual review(Annual)    SUBJECTIVE/OBJECTIVE:  Presents for: Individual review(Annual)  Accompanied by: Self  Diabetes education in the past 24mo: Yes  Focus of Visit: Reducing Risks, Monitoring, Assistance w/ making life changes  Diabetes type: Type 2  Date of diagnosis: 2008  Disease course: Worsening  How confident are you filling out medical forms by yourself:: Somewhat  Diabetes management related comments/concerns: No concerns  Transportation concerns: No  Difficulty affording diabetes medication?: No  Difficulty affording diabetes testing supplies?: No  Other concerns:: None  Cultural Influences/Ethnic Background:  American    Diabetes Symptoms & Complications:  Fatigue: Yes(Not sleeping well.)  Neuropathy: No  Polydipsia: No  Polyphagia: No  Polyuria: No  Visual change: No  Slow healing wounds: No  Other: No  Symptom course: Stable  Weight trend: Decreasing(Down 7# since last visit 1/2020.)  Complications assessed today?: Yes  Autonomic neuropathy: No  CVA: No  Heart disease: No  Nephropathy: No  Peripheral neuropathy: No  Foot ulcerations: No  Peripheral Vascular Disease: No  Retinopathy: No    Patient Problem List and Family Medical History reviewed for relevant medical history, current medical status, and diabetes risk factors.    Vitals:  /74   Pulse 75   Temp 98.1  F (36.7  C) (Tympanic)   Wt 118.8 kg (262 lb)   SpO2 97%   BMI 37.86 kg/m    Estimated body mass index is 37.86 kg/m  as calculated from the following:    Height as of 1/29/20: 1.772 m (5' 9.75\").    Weight as of this encounter: 118.8 kg (262 lb).   Last 3 BP:   BP Readings from Last 3 Encounters:   06/16/21 131/74   06/14/20 (!) 165/105   06/13/20 178/92       History   Smoking Status     Former Smoker     Types: Cigarettes     Quit date: 1991   Smokeless Tobacco     Never Used "       Labs:  Lab Results   Component Value Date    A1C 10.2 06/16/2021     Lab Results   Component Value Date     06/16/2021     Lab Results   Component Value Date    LDL 99 12/16/2019     HDL Cholesterol   Date Value Ref Range Status   12/16/2019 30 (L) >=60 mg/dL Final   ]  GFR Estimate   Date Value Ref Range Status   06/16/2021 90 >60 mL/min/[1.73_m2] Final     Comment:     Non  GFR Calc  Starting 12/18/2018, serum creatinine based estimated GFR (eGFR) will be   calculated using the Chronic Kidney Disease Epidemiology Collaboration   (CKD-EPI) equation.       GFR Estimate If Black   Date Value Ref Range Status   06/16/2021 >90 >60 mL/min/[1.73_m2] Final     Comment:      GFR Calc  Starting 12/18/2018, serum creatinine based estimated GFR (eGFR) will be   calculated using the Chronic Kidney Disease Epidemiology Collaboration   (CKD-EPI) equation.       Lab Results   Component Value Date    CR 0.81 06/16/2021     No results found for: MICROALBUMIN    Healthy Eating:  Healthy Eating Assessed Today: Yes  Cultural/Jehovah's witness diet restrictions?: No  Meal planning/habits: Avoiding sweets  How many times a week on average do you eat food made away from home (restaurant/take-out)?: 0  Meals include: Breakfast, Lunch, Dinner  Breakfast: Bagel with cream cheese or butter or 2 eggs with 1 slice toast with butter - water or diet soda  Lunch: sandwich - diet soda  Dinner: meat, veggie or salad - occasional small portion starch - milk  Snacks: sugar free cookies during the day  Beverages: Water, Milk, Diet soda(Crystal Lite)  Has patient met with a dietitian in the past?: Yes    Being Active:  Being Active Assessed Today: Yes  Exercise:: Yes(Treadmill)  Days per week of moderate to strenuous exercise (like a brisk walk): 7  On average, minutes per day of exercise at this level: 10  How intense was your typical exercise? : Light (like stretching or slow walking)  Exercise Minutes per Week:  70  Barrier to exercise: None    Monitoring:  Monitoring Assessed Today: Yes  Did patient bring glucose meter to appointment? : Yes(strips are )  Blood Glucose Meter: Accu-chek(Guide Me)  Times checking blood sugar at home (number): Other(Pt checks at random times only.  Did check 2x/day prior to visit.)  Blood glucose trend: Increasing  Fasting-196, 177, 168, 155, 177, 181  Noon-114  2 hours post supper-175, 163, 170, 213, 156    Taking Medications:  Diabetes Medication(s)     Incretin Mimetic Agents (GLP-1 Receptor Agonists)       dulaglutide (TRULICITY) 1.5 MG/0.5ML pen    Inject 1.5 mg Subcutaneous every 7 days        Taking Medication Assessed Today: Yes  Current Treatments: Non-insulin Injectables, Diet(Trulicity 0.75 mg weekly.)  Problems taking diabetes medications regularly?: No  Diabetes medication side effects?: No    Problem Solving:  Problem Solving Assessed Today: Yes  Is the patient at risk for hypoglycemia?: Yes  Hypoglycemia Frequency: Never    Reducing Risks:  Reducing Risks Assessed Today: No  Diabetes Risks: Age over 45 years, Sedentary Lifestyle, Family History  CAD Risks: Diabetes Mellitus, Male sex, Obesity, Family history, Sedentary lifestyle, Stress  Has dilated eye exam at least once a year?: Yes(scheduled in July)  Sees dentist every 6 months?: Yes  Feet checked by healthcare provider in the last year?: Yes    Healthy Coping:  Healthy Coping Assessed Today: Yes  Emotional response to diabetes: Ready to learn, Concern for health and well-being  Informal Support system:: Spouse  Stage of change: MAINTENANCE (Working to maintain change, with risk of relapse)  Support resources: None    Diabetes knowledge and skills assessment:   Patient is knowledgeable in diabetes management concepts related to: Healthy Eating and Taking Medication    Patient needs further education on the following diabetes management concepts: Reducing Risks    Based on learning assessment above, most appropriate  setting for further diabetes education would be: Individual setting.      INTERVENTIONS:    Education provided today on:  AADE Self-Care Behaviors:  Being Active: relationship to blood glucose  Monitoring: individual blood glucose targets, frequency of monitoring.  New Accu-Chek Guide Me meter provided today as pt's would not download.   Taking Medication: Discussed need for dose adjustment of Trulicity r/t elevated A1c.   Reducing Risks: major complications of diabetes and A1C - goals, relating to blood glucose levels, how often to check    Opportunities for ongoing education and support in diabetes-self management were discussed.    Pt verbalized understanding of concepts discussed and recommendations provided today.       Education Materials Provided:  No new materials today.     ASSESSMENT:  Pt's A1c today was 10.2%.  Last noted A1c was 6.9% Jan 2020.  Pt expected A1c to be increased as he noted levels trending up and has had much stress in his life r/t death of two sisters and having to deal with their estates.  Diet does not seem to be excessive in carbohydrates.  He does some minimal walking on his treadmill - more walking encouraged.  Pt is agreeable to increasing Trulicity in effort to reduce A1c.      Patient's most recent   Lab Results   Component Value Date    A1C 10.2 06/16/2021    is not meeting goal of <7.0    PLAN  Continue efforts to limit foods high in carbohydrates in diet.   Try to increase walking for exercise.  Test glucose 2x/day- fasting and 2 hours post supper.  Increase Trulicity to 1.5 mg weekly.   See Patient Instructions for co-developed, patient-stated behavior change goals.  AVS printed and provided to patient today.  Follow up in one month to review glucose levels and adjust medication as needed.       Time Spent: 60 minutes  Encounter Type: Individual    Any diabetes medication dose changes were made via the CDE Protocol and Collaborative Practice Agreement with the patient's  referring provider. A copy of this encounter was shared with the provider.          Sincerely,        Ria Morillo RD

## 2021-06-16 NOTE — PROGRESS NOTES
"Diabetes Self-Management Education & Support    Presents for: Individual review(Annual)    SUBJECTIVE/OBJECTIVE:  Presents for: Individual review(Annual)  Accompanied by: Self  Diabetes education in the past 24mo: Yes  Focus of Visit: Reducing Risks, Monitoring, Assistance w/ making life changes  Diabetes type: Type 2  Date of diagnosis: 2008  Disease course: Worsening  How confident are you filling out medical forms by yourself:: Somewhat  Diabetes management related comments/concerns: No concerns  Transportation concerns: No  Difficulty affording diabetes medication?: No  Difficulty affording diabetes testing supplies?: No  Other concerns:: None  Cultural Influences/Ethnic Background:  American    Diabetes Symptoms & Complications:  Fatigue: Yes(Not sleeping well.)  Neuropathy: No  Polydipsia: No  Polyphagia: No  Polyuria: No  Visual change: No  Slow healing wounds: No  Other: No  Symptom course: Stable  Weight trend: Decreasing(Down 7# since last visit 1/2020.)  Complications assessed today?: Yes  Autonomic neuropathy: No  CVA: No  Heart disease: No  Nephropathy: No  Peripheral neuropathy: No  Foot ulcerations: No  Peripheral Vascular Disease: No  Retinopathy: No    Patient Problem List and Family Medical History reviewed for relevant medical history, current medical status, and diabetes risk factors.    Vitals:  /74   Pulse 75   Temp 98.1  F (36.7  C) (Tympanic)   Wt 118.8 kg (262 lb)   SpO2 97%   BMI 37.86 kg/m    Estimated body mass index is 37.86 kg/m  as calculated from the following:    Height as of 1/29/20: 1.772 m (5' 9.75\").    Weight as of this encounter: 118.8 kg (262 lb).   Last 3 BP:   BP Readings from Last 3 Encounters:   06/16/21 131/74   06/14/20 (!) 165/105   06/13/20 178/92       History   Smoking Status     Former Smoker     Types: Cigarettes     Quit date: 1991   Smokeless Tobacco     Never Used       Labs:  Lab Results   Component Value Date    A1C 10.2 06/16/2021     Lab Results "   Component Value Date     06/16/2021     Lab Results   Component Value Date    LDL 99 12/16/2019     HDL Cholesterol   Date Value Ref Range Status   12/16/2019 30 (L) >=60 mg/dL Final   ]  GFR Estimate   Date Value Ref Range Status   06/16/2021 90 >60 mL/min/[1.73_m2] Final     Comment:     Non  GFR Calc  Starting 12/18/2018, serum creatinine based estimated GFR (eGFR) will be   calculated using the Chronic Kidney Disease Epidemiology Collaboration   (CKD-EPI) equation.       GFR Estimate If Black   Date Value Ref Range Status   06/16/2021 >90 >60 mL/min/[1.73_m2] Final     Comment:      GFR Calc  Starting 12/18/2018, serum creatinine based estimated GFR (eGFR) will be   calculated using the Chronic Kidney Disease Epidemiology Collaboration   (CKD-EPI) equation.       Lab Results   Component Value Date    CR 0.81 06/16/2021     No results found for: MICROALBUMIN    Healthy Eating:  Healthy Eating Assessed Today: Yes  Cultural/Judaism diet restrictions?: No  Meal planning/habits: Avoiding sweets  How many times a week on average do you eat food made away from home (restaurant/take-out)?: 0  Meals include: Breakfast, Lunch, Dinner  Breakfast: Bagel with cream cheese or butter or 2 eggs with 1 slice toast with butter - water or diet soda  Lunch: sandwich - diet soda  Dinner: meat, veggie or salad - occasional small portion starch - milk  Snacks: sugar free cookies during the day  Beverages: Water, Milk, Diet soda(Crystal Lite)  Has patient met with a dietitian in the past?: Yes    Being Active:  Being Active Assessed Today: Yes  Exercise:: Yes(Treadmill)  Days per week of moderate to strenuous exercise (like a brisk walk): 7  On average, minutes per day of exercise at this level: 10  How intense was your typical exercise? : Light (like stretching or slow walking)  Exercise Minutes per Week: 70  Barrier to exercise: None    Monitoring:  Monitoring Assessed Today: Yes  Did  patient bring glucose meter to appointment? : Yes(strips are )  Blood Glucose Meter: Accu-chek(Guide Me)  Times checking blood sugar at home (number): Other(Pt checks at random times only.  Did check 2x/day prior to visit.)  Blood glucose trend: Increasing  Fasting-196, 177, 168, 155, 177, 181  Noon-114  2 hours post supper-175, 163, 170, 213, 156    Taking Medications:  Diabetes Medication(s)     Incretin Mimetic Agents (GLP-1 Receptor Agonists)       dulaglutide (TRULICITY) 1.5 MG/0.5ML pen    Inject 1.5 mg Subcutaneous every 7 days        Taking Medication Assessed Today: Yes  Current Treatments: Non-insulin Injectables, Diet(Trulicity 0.75 mg weekly.)  Problems taking diabetes medications regularly?: No  Diabetes medication side effects?: No    Problem Solving:  Problem Solving Assessed Today: Yes  Is the patient at risk for hypoglycemia?: Yes  Hypoglycemia Frequency: Never    Reducing Risks:  Reducing Risks Assessed Today: No  Diabetes Risks: Age over 45 years, Sedentary Lifestyle, Family History  CAD Risks: Diabetes Mellitus, Male sex, Obesity, Family history, Sedentary lifestyle, Stress  Has dilated eye exam at least once a year?: Yes(scheduled in July)  Sees dentist every 6 months?: Yes  Feet checked by healthcare provider in the last year?: Yes    Healthy Coping:  Healthy Coping Assessed Today: Yes  Emotional response to diabetes: Ready to learn, Concern for health and well-being  Informal Support system:: Spouse  Stage of change: MAINTENANCE (Working to maintain change, with risk of relapse)  Support resources: None    Diabetes knowledge and skills assessment:   Patient is knowledgeable in diabetes management concepts related to: Healthy Eating and Taking Medication    Patient needs further education on the following diabetes management concepts: Reducing Risks    Based on learning assessment above, most appropriate setting for further diabetes education would be: Individual  setting.      INTERVENTIONS:    Education provided today on:  AADE Self-Care Behaviors:  Being Active: relationship to blood glucose  Monitoring: individual blood glucose targets, frequency of monitoring.  New Accu-Chek Guide Me meter provided today as pt's would not download.   Taking Medication: Discussed need for dose adjustment of Trulicity r/t elevated A1c.   Reducing Risks: major complications of diabetes and A1C - goals, relating to blood glucose levels, how often to check    Opportunities for ongoing education and support in diabetes-self management were discussed.    Pt verbalized understanding of concepts discussed and recommendations provided today.       Education Materials Provided:  No new materials today.     ASSESSMENT:  Pt's A1c today was 10.2%.  Last noted A1c was 6.9% Jan 2020.  Pt expected A1c to be increased as he noted levels trending up and has had much stress in his life r/t death of two sisters and having to deal with their estates.  Diet does not seem to be excessive in carbohydrates.  He does some minimal walking on his treadmill - more walking encouraged.  Pt is agreeable to increasing Trulicity in effort to reduce A1c.      Patient's most recent   Lab Results   Component Value Date    A1C 10.2 06/16/2021    is not meeting goal of <7.0    PLAN  Continue efforts to limit foods high in carbohydrates in diet.   Try to increase walking for exercise.  Test glucose 2x/day- fasting and 2 hours post supper.  Increase Trulicity to 1.5 mg weekly.   See Patient Instructions for co-developed, patient-stated behavior change goals.  AVS printed and provided to patient today.  Follow up in one month to review glucose levels and adjust medication as needed.       Time Spent: 60 minutes  Encounter Type: Individual    Any diabetes medication dose changes were made via the CDE Protocol and Collaborative Practice Agreement with the patient's referring provider. A copy of this encounter was shared with the  provider.

## 2021-06-16 NOTE — PATIENT INSTRUCTIONS
-Keep following healthy, low carbohydrate meal plan.  -Be as active as you can.  Exercise helps lower your glucose levels.  -Test glucose 2x/day - fasting and 2 hours after supper meal.   -Target levels are fasting ,  2 hours after meal less than 180.  -A1c today was 10.2%.  Goal is < 7.0%.   -Increase your Trulicity to 1.5 mg weekly.  You can take 2 injections of your 0.75 mg pens to use them up.  -Follow up in one month.    -Call with any concerns or side effects after increasing dose of Trulicity.    -PATRIZIA Short, -[phone].

## 2021-06-17 ASSESSMENT — ANXIETY QUESTIONNAIRES: GAD7 TOTAL SCORE: 6

## 2021-06-29 DIAGNOSIS — H25.13 AGE-RELATED NUCLEAR CATARACT OF BOTH EYES: ICD-10-CM

## 2021-06-29 DIAGNOSIS — H40.033 ANATOMICAL NARROW ANGLE OF BOTH EYES: ICD-10-CM

## 2021-06-29 DIAGNOSIS — H04.123 TEAR FILM INSUFFICIENCY, BILATERAL: ICD-10-CM

## 2021-06-29 DIAGNOSIS — F51.01 PRIMARY INSOMNIA: ICD-10-CM

## 2021-06-29 DIAGNOSIS — H04.123 DRY EYE SYNDROME OF BOTH EYES: ICD-10-CM

## 2021-06-29 DIAGNOSIS — I10 ESSENTIAL HYPERTENSION: ICD-10-CM

## 2021-06-29 DIAGNOSIS — E78.2 MIXED HYPERLIPIDEMIA: ICD-10-CM

## 2021-06-29 DIAGNOSIS — N40.0 BENIGN PROSTATIC HYPERPLASIA WITHOUT LOWER URINARY TRACT SYMPTOMS: ICD-10-CM

## 2021-06-29 DIAGNOSIS — R35.1 NOCTURIA: ICD-10-CM

## 2021-06-29 DIAGNOSIS — E66.9 OBESITY: ICD-10-CM

## 2021-06-29 DIAGNOSIS — R19.7 DIARRHEA: ICD-10-CM

## 2021-06-29 DIAGNOSIS — G47.33 OSA (OBSTRUCTIVE SLEEP APNEA): ICD-10-CM

## 2021-06-29 DIAGNOSIS — E55.9 VITAMIN D DEFICIENCY: ICD-10-CM

## 2021-06-29 DIAGNOSIS — E11.65 TYPE 2 DIABETES MELLITUS WITH HYPERGLYCEMIA, WITHOUT LONG-TERM CURRENT USE OF INSULIN (H): Primary | ICD-10-CM

## 2021-06-29 PROBLEM — G47.00 INSOMNIA: Status: ACTIVE | Noted: 2021-06-29

## 2021-07-07 ENCOUNTER — HOSPITAL ENCOUNTER (OUTPATIENT)
Dept: EDUCATION SERVICES | Facility: HOSPITAL | Age: 71
Discharge: HOME | End: 2021-07-07
Attending: NURSE PRACTITIONER | Admitting: NURSE PRACTITIONER
Payer: MEDICARE

## 2021-07-07 VITALS
SYSTOLIC BLOOD PRESSURE: 137 MMHG | HEART RATE: 70 BPM | DIASTOLIC BLOOD PRESSURE: 79 MMHG | WEIGHT: 258 LBS | BODY MASS INDEX: 37.28 KG/M2 | OXYGEN SATURATION: 98 % | RESPIRATION RATE: 16 BRPM

## 2021-07-07 DIAGNOSIS — E11.9 TYPE 2 DIABETES MELLITUS WITHOUT COMPLICATION, WITHOUT LONG-TERM CURRENT USE OF INSULIN (H): ICD-10-CM

## 2021-07-07 PROCEDURE — G0108 DIAB MANAGE TRN  PER INDIV: HCPCS | Performed by: DIETITIAN, REGISTERED

## 2021-07-07 RX ORDER — DULAGLUTIDE 1.5 MG/.5ML
1.5 INJECTION, SOLUTION SUBCUTANEOUS
Qty: 6 ML | Refills: 0 | Status: SHIPPED | OUTPATIENT
Start: 2021-07-07 | End: 2021-09-20

## 2021-07-07 ASSESSMENT — PAIN SCALES - GENERAL: PAINLEVEL: NO PAIN (0)

## 2021-07-07 NOTE — PROGRESS NOTES
"Diabetes Self-Management Education & Support    Presents for: Follow-up    SUBJECTIVE/OBJECTIVE:  Presents for: Follow-up  Accompanied by: Self  Diabetes education in the past 24mo: Yes  Focus of Visit: Reducing Risks, Monitoring, Assistance w/ making life changes  Diabetes type: Type 2  Date of diagnosis: 2008  Disease course: Improving  How confident are you filling out medical forms by yourself:: Quite a bit  Diabetes management related comments/concerns: No concerns  Transportation concerns: No  Difficulty affording diabetes medication?: No  Difficulty affording diabetes testing supplies?: No  Other concerns:: None  Cultural Influences/Ethnic Background:  American    Diabetes Symptoms & Complications:  Fatigue: Yes(Not sleeping well.)  Neuropathy: No  Polydipsia: No  Polyphagia: No  Polyuria: No  Visual change: No  Slow healing wounds: No  Other: No  Symptom course: Stable  Weight trend: Decreasing(Weight is down 4# since increasing dose of Trulicity on 6/19.)  Complications assessed today?: No  Autonomic neuropathy: No  CVA: No  Heart disease: No  Nephropathy: No  Peripheral neuropathy: No  Foot ulcerations: No  Peripheral Vascular Disease: No  Retinopathy: No    Patient Problem List and Family Medical History reviewed for relevant medical history, current medical status, and diabetes risk factors.    Vitals:  /79   Pulse 70   Resp 16   Wt 117 kg (258 lb)   SpO2 98%   BMI 37.28 kg/m    Estimated body mass index is 37.28 kg/m  as calculated from the following:    Height as of 1/29/20: 1.772 m (5' 9.75\").    Weight as of this encounter: 117 kg (258 lb).   Last 3 BP:   BP Readings from Last 3 Encounters:   07/07/21 137/79   06/16/21 131/74   06/14/20 (!) 165/105       History   Smoking Status     Former Smoker     Years: 25.00     Types: Cigarettes     Quit date: 1991   Smokeless Tobacco     Never Used       Labs:  Lab Results   Component Value Date    A1C 10.2 06/16/2021     Lab Results   Component " Value Date     06/16/2021     Lab Results   Component Value Date    LDL 99 12/16/2019     HDL Cholesterol   Date Value Ref Range Status   12/16/2019 30 (L) >=60 mg/dL Final   ]  GFR Estimate   Date Value Ref Range Status   06/16/2021 90 >60 mL/min/[1.73_m2] Final     Comment:     Non  GFR Calc  Starting 12/18/2018, serum creatinine based estimated GFR (eGFR) will be   calculated using the Chronic Kidney Disease Epidemiology Collaboration   (CKD-EPI) equation.       GFR Estimate If Black   Date Value Ref Range Status   06/16/2021 >90 >60 mL/min/[1.73_m2] Final     Comment:      GFR Calc  Starting 12/18/2018, serum creatinine based estimated GFR (eGFR) will be   calculated using the Chronic Kidney Disease Epidemiology Collaboration   (CKD-EPI) equation.       Lab Results   Component Value Date    CR 0.81 06/16/2021     No results found for: MICROALBUMIN    Healthy Eating:  Healthy Eating Assessed Today: Yes  Cultural/Zoroastrianism diet restrictions?: No  Meal planning/habits: Avoiding sweets  How many times a week on average do you eat food made away from home (restaurant/take-out)?: 0  Meals include: Breakfast, Lunch, Dinner  Breakfast: Bagel with cream cheese or butter or 2 eggs with 1 slice toast with butter - water or diet soda  Lunch: sandwich - diet soda  Dinner: meat, veggie or salad - occasional small portion starch - milk  Snacks: sugar free cookies during the day  Beverages: Water, Milk, Diet soda(Crystal Lite)  Has patient met with a dietitian in the past?: Yes    Being Active:  Being Active Assessed Today: Yes  Exercise:: Yes(Walks 10k per week doing usual activities)  How intense was your typical exercise? : Light (like stretching or slow walking)  Barrier to exercise: None    Monitoring:  Monitoring Assessed Today: Yes  Did patient bring glucose meter to appointment? : Yes  Blood Glucose Meter: Accu-chek(Will change to One Touch when supplies for Accu-Chek are gone per  his insurance)  Times checking blood sugar at home (number): 2  Times checking blood sugar at home (per): Day  Blood glucose trend: Decreasing  Fasting-175, 178, 176, 182, 158, 156, 170, 147, 149, 158, 156, 153, 193, 138  2 hours post supper-286, 179, 160, 155, 148, 173, 129, 181, 162, 137, 226 (pizza), 123  Two week average is 167.    Taking Medications:  Diabetes Medication(s)     Incretin Mimetic Agents (GLP-1 Receptor Agonists)       dulaglutide (TRULICITY) 1.5 MG/0.5ML pen    Inject 1.5 mg Subcutaneous every 7 days        Taking Medication Assessed Today: Yes  Current Treatments: Non-insulin Injectables, Diet(Trulicity 1.5 mg weekly)  Problems taking diabetes medications regularly?: No  Diabetes medication side effects?: No    Problem Solving:  Problem Solving Assessed Today: Yes  Is the patient at risk for hypoglycemia?: Yes  Hypoglycemia Frequency: Never    Reducing Risks:  Reducing Risks Assessed Today: No  Diabetes Risks: Age over 45 years, Sedentary Lifestyle, Family History  CAD Risks: Diabetes Mellitus, Male sex, Obesity, Family history, Sedentary lifestyle, Stress  Has dilated eye exam at least once a year?: Yes(scheduled in August)  Sees dentist every 6 months?: Yes  Feet checked by healthcare provider in the last year?: Yes    Healthy Coping:  Healthy Coping Assessed Today: Yes  Emotional response to diabetes: Ready to learn, Concern for health and well-being  Informal Support system:: Spouse  Stage of change: ACTION (Actively working towards change)  Support resources: None  Patient Activation Measure Survey Score:  No flowsheet data found.    Diabetes knowledge and skills assessment:   Patient is knowledgeable in diabetes management concepts related to: Healthy Eating, Monitoring and Taking Medication    Patient needs further education on the following diabetes management concepts: Reducing Risks    Based on learning assessment above, most appropriate setting for further diabetes education would be:  Individual setting.      INTERVENTIONS:    Education provided today on:  AADE Self-Care Behaviors:  Being Active: relationship to blood glucose  Reducing Risks: A1C - goals, relating to blood glucose levels, how often to check    Opportunities for ongoing education and support in diabetes-self management were discussed.    Pt verbalized understanding of concepts discussed and recommendations provided today.       Education Materials Provided:  No new materials today.     ASSESSMENT:  Glucose levels with significant improvement since increasing dose of Trulicity.  Pt has only been on increased dose x 3 weeks so expect continued improvement.  He notes no side effects from increasing dose.  Weight is down 4#.     Patient's most recent   Lab Results   Component Value Date    A1C 10.2 06/16/2021    is not meeting goal of <7.0    PLAN  Continue to limit foods high in carbohydrates.   Try to get some daily exercise.  Test glucose 2x/day- fasting and 2 hours post supper.  Continue Trulicity 1.5 mg weekly.   See Patient Instructions for co-developed, patient-stated behavior change goals.  AVS printed and provided to patient today.   Follow up in Sept for A1c check.     Time Spent: 30 minutes  Encounter Type: Individual    Any diabetes medication dose changes were made via the CDE Protocol and Collaborative Practice Agreement with the patient's referring provider. A copy of this encounter was shared with the provider.

## 2021-07-07 NOTE — LETTER
"    7/7/2021        RE: Donis Watt  [address]  [facility], [facility] [id number]        Diabetes Self-Management Education & Support    Presents for: Follow-up    SUBJECTIVE/OBJECTIVE:  Presents for: Follow-up  Accompanied by: Self  Diabetes education in the past 24mo: Yes  Focus of Visit: Reducing Risks, Monitoring, Assistance w/ making life changes  Diabetes type: Type 2  Date of diagnosis: 2008  Disease course: Improving  How confident are you filling out medical forms by yourself:: Quite a bit  Diabetes management related comments/concerns: No concerns  Transportation concerns: No  Difficulty affording diabetes medication?: No  Difficulty affording diabetes testing supplies?: No  Other concerns:: None  Cultural Influences/Ethnic Background:  American    Diabetes Symptoms & Complications:  Fatigue: Yes(Not sleeping well.)  Neuropathy: No  Polydipsia: No  Polyphagia: No  Polyuria: No  Visual change: No  Slow healing wounds: No  Other: No  Symptom course: Stable  Weight trend: Decreasing(Weight is down 4# since increasing dose of Trulicity on 6/19.)  Complications assessed today?: No  Autonomic neuropathy: No  CVA: No  Heart disease: No  Nephropathy: No  Peripheral neuropathy: No  Foot ulcerations: No  Peripheral Vascular Disease: No  Retinopathy: No    Patient Problem List and Family Medical History reviewed for relevant medical history, current medical status, and diabetes risk factors.    Vitals:  /79   Pulse 70   Resp 16   Wt 117 kg (258 lb)   SpO2 98%   BMI 37.28 kg/m    Estimated body mass index is 37.28 kg/m  as calculated from the following:    Height as of 1/29/20: 1.772 m (5' 9.75\").    Weight as of this encounter: 117 kg (258 lb).   Last 3 BP:   BP Readings from Last 3 Encounters:   07/07/21 137/79   06/16/21 131/74   06/14/20 (!) 165/105       History   Smoking Status     Former Smoker     Years: 25.00     Types: Cigarettes     Quit date: 1991   Smokeless Tobacco     Never Used       Labs:  Lab " Results   Component Value Date    A1C 10.2 06/16/2021     Lab Results   Component Value Date     06/16/2021     Lab Results   Component Value Date    LDL 99 12/16/2019     HDL Cholesterol   Date Value Ref Range Status   12/16/2019 30 (L) >=60 mg/dL Final   ]  GFR Estimate   Date Value Ref Range Status   06/16/2021 90 >60 mL/min/[1.73_m2] Final     Comment:     Non  GFR Calc  Starting 12/18/2018, serum creatinine based estimated GFR (eGFR) will be   calculated using the Chronic Kidney Disease Epidemiology Collaboration   (CKD-EPI) equation.       GFR Estimate If Black   Date Value Ref Range Status   06/16/2021 >90 >60 mL/min/[1.73_m2] Final     Comment:      GFR Calc  Starting 12/18/2018, serum creatinine based estimated GFR (eGFR) will be   calculated using the Chronic Kidney Disease Epidemiology Collaboration   (CKD-EPI) equation.       Lab Results   Component Value Date    CR 0.81 06/16/2021     No results found for: MICROALBUMIN    Healthy Eating:  Healthy Eating Assessed Today: Yes  Cultural/Episcopal diet restrictions?: No  Meal planning/habits: Avoiding sweets  How many times a week on average do you eat food made away from home (restaurant/take-out)?: 0  Meals include: Breakfast, Lunch, Dinner  Breakfast: Bagel with cream cheese or butter or 2 eggs with 1 slice toast with butter - water or diet soda  Lunch: sandwich - diet soda  Dinner: meat, veggie or salad - occasional small portion starch - milk  Snacks: sugar free cookies during the day  Beverages: Water, Milk, Diet soda(Crystal Lite)  Has patient met with a dietitian in the past?: Yes    Being Active:  Being Active Assessed Today: Yes  Exercise:: Yes(Walks 10k per week doing usual activities)  How intense was your typical exercise? : Light (like stretching or slow walking)  Barrier to exercise: None    Monitoring:  Monitoring Assessed Today: Yes  Did patient bring glucose meter to appointment? : Yes  Blood Glucose  Meter: Accu-chek(Will change to One Touch when supplies for Accu-Chek are gone per his insurance)  Times checking blood sugar at home (number): 2  Times checking blood sugar at home (per): Day  Blood glucose trend: Decreasing  Fasting-175, 178, 176, 182, 158, 156, 170, 147, 149, 158, 156, 153, 193, 138  2 hours post supper-286, 179, 160, 155, 148, 173, 129, 181, 162, 137, 226 (pizza), 123  Two week average is 167.    Taking Medications:  Diabetes Medication(s)     Incretin Mimetic Agents (GLP-1 Receptor Agonists)       dulaglutide (TRULICITY) 1.5 MG/0.5ML pen    Inject 1.5 mg Subcutaneous every 7 days        Taking Medication Assessed Today: Yes  Current Treatments: Non-insulin Injectables, Diet(Trulicity 1.5 mg weekly)  Problems taking diabetes medications regularly?: No  Diabetes medication side effects?: No    Problem Solving:  Problem Solving Assessed Today: Yes  Is the patient at risk for hypoglycemia?: Yes  Hypoglycemia Frequency: Never    Reducing Risks:  Reducing Risks Assessed Today: No  Diabetes Risks: Age over 45 years, Sedentary Lifestyle, Family History  CAD Risks: Diabetes Mellitus, Male sex, Obesity, Family history, Sedentary lifestyle, Stress  Has dilated eye exam at least once a year?: Yes(scheduled in August)  Sees dentist every 6 months?: Yes  Feet checked by healthcare provider in the last year?: Yes    Healthy Coping:  Healthy Coping Assessed Today: Yes  Emotional response to diabetes: Ready to learn, Concern for health and well-being  Informal Support system:: Spouse  Stage of change: ACTION (Actively working towards change)  Support resources: None  Patient Activation Measure Survey Score:  No flowsheet data found.    Diabetes knowledge and skills assessment:   Patient is knowledgeable in diabetes management concepts related to: Healthy Eating, Monitoring and Taking Medication    Patient needs further education on the following diabetes management concepts: Reducing Risks    Based on learning  assessment above, most appropriate setting for further diabetes education would be: Individual setting.      INTERVENTIONS:    Education provided today on:  AADE Self-Care Behaviors:  Being Active: relationship to blood glucose  Reducing Risks: A1C - goals, relating to blood glucose levels, how often to check    Opportunities for ongoing education and support in diabetes-self management were discussed.    Pt verbalized understanding of concepts discussed and recommendations provided today.       Education Materials Provided:  No new materials today.     ASSESSMENT:  Glucose levels with significant improvement since increasing dose of Trulicity.  Pt has only been on increased dose x 3 weeks so expect continued improvement.  He notes no side effects from increasing dose.  Weight is down 4#.     Patient's most recent   Lab Results   Component Value Date    A1C 10.2 06/16/2021    is not meeting goal of <7.0    PLAN  Continue to limit foods high in carbohydrates.   Try to get some daily exercise.  Test glucose 2x/day- fasting and 2 hours post supper.  Continue Trulicity 1.5 mg weekly.   See Patient Instructions for co-developed, patient-stated behavior change goals.  AVS printed and provided to patient today.   Follow up in Sept for A1c check.     Time Spent: 30 minutes  Encounter Type: Individual    Any diabetes medication dose changes were made via the CDE Protocol and Collaborative Practice Agreement with the patient's referring provider. A copy of this encounter was shared with the provider.          Sincerely,        Ria Morillo RD

## 2021-07-07 NOTE — PATIENT INSTRUCTIONS
-Continue to limit foods high in carbohydrates.    -Try to get some daily exercise.  Exercise helps lower glucose levels.  -Keep testing glucose 2x/day.  Fasting and 2 hours post supper.  -Target levels are fasting  and 2 hours after supper less than 180.  -Glucose average today was 167.  Goal is <150.   -Keep taking your same dose of Trulicity.   -Follow up in Sept for A1c check.  -Call with any concerns - PATRIZIA Short, [phone].

## 2021-08-18 ENCOUNTER — TRANSFERRED RECORDS (OUTPATIENT)
Dept: HEALTH INFORMATION MANAGEMENT | Facility: CLINIC | Age: 71
End: 2021-08-18

## 2021-08-18 LAB — RETINOPATHY: NEGATIVE

## 2021-09-20 ENCOUNTER — HOSPITAL ENCOUNTER (OUTPATIENT)
Dept: EDUCATION SERVICES | Facility: HOSPITAL | Age: 71
Discharge: HOME | End: 2021-09-20
Attending: DIETITIAN, REGISTERED | Admitting: FAMILY MEDICINE
Payer: MEDICARE

## 2021-09-20 VITALS
HEART RATE: 81 BPM | OXYGEN SATURATION: 98 % | DIASTOLIC BLOOD PRESSURE: 68 MMHG | BODY MASS INDEX: 34.93 KG/M2 | RESPIRATION RATE: 16 BRPM | WEIGHT: 244 LBS | HEIGHT: 70 IN | SYSTOLIC BLOOD PRESSURE: 118 MMHG

## 2021-09-20 DIAGNOSIS — E11.9 TYPE 2 DIABETES MELLITUS WITHOUT COMPLICATION, WITHOUT LONG-TERM CURRENT USE OF INSULIN (H): ICD-10-CM

## 2021-09-20 LAB — HBA1C MFR BLD: 6.5 % (ref 0–5.7)

## 2021-09-20 PROCEDURE — G0108 DIAB MANAGE TRN  PER INDIV: HCPCS | Performed by: DIETITIAN, REGISTERED

## 2021-09-20 PROCEDURE — 83036 HEMOGLOBIN GLYCOSYLATED A1C: CPT | Performed by: NURSE PRACTITIONER

## 2021-09-20 RX ORDER — DULAGLUTIDE 1.5 MG/.5ML
1.5 INJECTION, SOLUTION SUBCUTANEOUS
Qty: 6 ML | Refills: 1 | Status: SHIPPED | OUTPATIENT
Start: 2021-09-20 | End: 2022-04-18

## 2021-09-20 ASSESSMENT — PAIN SCALES - GENERAL: PAINLEVEL: NO PAIN (0)

## 2021-09-20 ASSESSMENT — MIFFLIN-ST. JEOR: SCORE: 1877

## 2021-09-20 NOTE — LETTER
"    9/20/2021        RE: Donis Watt  [address]  [facility] [address]        Diabetes Self-Management Education & Support    Presents for: Follow-up    SUBJECTIVE/OBJECTIVE:  Presents for: Follow-up  Accompanied by: Self  Diabetes education in the past 24mo: Yes  Focus of Visit: Reducing Risks, Monitoring, Assistance w/ making life changes  Diabetes type: Type 2  Date of diagnosis: 2008  Disease course: Improving  How confident are you filling out medical forms by yourself:: Quite a bit  Diabetes management related comments/concerns: No concerns  Transportation concerns: No  Difficulty affording diabetes medication?: No  Difficulty affording diabetes testing supplies?: No  Other concerns:: None  Cultural Influences/Ethnic Background:  American    Diabetes Symptoms & Complications:  Fatigue: No  Neuropathy: No  Polydipsia: No  Polyphagia: No  Polyuria: No  Visual change: No  Slow healing wounds: No  Other: No  Symptom course: Stable  Weight trend: Decreasing (Weight is down 18# since increasing dose of Trulicity June 2021.)  Complications assessed today?: No  Autonomic neuropathy: No  CVA: No  Heart disease: No  Nephropathy: No  Peripheral neuropathy: No  Foot ulcerations: No  Peripheral Vascular Disease: No  Retinopathy: No    Patient Problem List and Family Medical History reviewed for relevant medical history, current medical status, and diabetes risk factors.    Vitals:  /68   Pulse 81   Resp 16   Ht 1.784 m (5' 10.25\")   Wt 110.7 kg (244 lb)   SpO2 98%   BMI 34.76 kg/m    Estimated body mass index is 34.76 kg/m  as calculated from the following:    Height as of this encounter: 1.784 m (5' 10.25\").    Weight as of this encounter: 110.7 kg (244 lb).   Last 3 BP:   BP Readings from Last 3 Encounters:   09/20/21 118/68   07/07/21 137/79   06/16/21 131/74       History   Smoking Status     Former Smoker     Years: 25.00     Types: Cigarettes     Quit date: 1991   Smokeless Tobacco     Never Used "       Labs:  Lab Results   Component Value Date    A1C 6.5 09/20/2021     Lab Results   Component Value Date     06/16/2021     Lab Results   Component Value Date    LDL 99 12/16/2019     HDL Cholesterol   Date Value Ref Range Status   12/16/2019 30 (L) >=60 mg/dL Final   ]  GFR Estimate   Date Value Ref Range Status   06/16/2021 90 >60 mL/min/[1.73_m2] Final     Comment:     Non  GFR Calc  Starting 12/18/2018, serum creatinine based estimated GFR (eGFR) will be   calculated using the Chronic Kidney Disease Epidemiology Collaboration   (CKD-EPI) equation.       GFR Estimate If Black   Date Value Ref Range Status   06/16/2021 >90 >60 mL/min/[1.73_m2] Final     Comment:      GFR Calc  Starting 12/18/2018, serum creatinine based estimated GFR (eGFR) will be   calculated using the Chronic Kidney Disease Epidemiology Collaboration   (CKD-EPI) equation.       Lab Results   Component Value Date    CR 0.81 06/16/2021     No results found for: MICROALBUMIN    Healthy Eating:  Healthy Eating Assessed Today: Yes  Cultural/Synagogue diet restrictions?: No  Meal planning/habits: Avoiding sweets  How many times a week on average do you eat food made away from home (restaurant/take-out)?: 0  Meals include: Breakfast, Lunch, Dinner  Breakfast: 1 slice bread with cheese  Lunch: leftovers or hummus and melquiades chips  Dinner: fish, small potato, veggies  Snacks: sugar free items during the day  Beverages: Water, Milk, Diet soda, Coffee (Crystal Lite)  Has patient met with a dietitian in the past?: Yes    Being Active:  Being Active Assessed Today: Yes  Exercise:: Yes (Has E-Bike and treadmill - using E-Bike now.)  Days per week of moderate to strenuous exercise (like a brisk walk): 5  On average, minutes per day of exercise at this level: 60  How intense was your typical exercise? : Moderate (like brisk walking)  Exercise Minutes per Week: 300  Barrier to exercise: None    Monitoring:  Monitoring  Assessed Today: Yes  Did patient bring glucose meter to appointment? : Yes  Blood Glucose Meter: One Touch (Verio)  Times checking blood sugar at home (number): 2  Times checking blood sugar at home (per): Day  Blood glucose trend: Decreasing  Ouvadia-  2 hours post [id number] with one at 202  Two week average is 132.     Taking Medications:  Diabetes Medication(s)     Incretin Mimetic Agents (GLP-1 Receptor Agonists)       dulaglutide (TRULICITY) 1.5 MG/0.5ML pen    Inject 1.5 mg Subcutaneous every 7 days        Taking Medication Assessed Today: Yes  Current Treatments: Non-insulin Injectables, Diet (Trulicity 1.5 mg weekly)  Problems taking diabetes medications regularly?: No  Diabetes medication side effects?: No    Problem Solving:  Problem Solving Assessed Today: Yes  Is the patient at risk for hypoglycemia?: Yes  Hypoglycemia Frequency: Never    Reducing Risks:  Reducing Risks Assessed Today: No  Diabetes Risks: Age over 45 years, Sedentary Lifestyle, Family History  CAD Risks: Diabetes Mellitus, Male sex, Obesity, Family history, Sedentary lifestyle, Stress  Has dilated eye exam at least once a year?: Yes (scheduled in August)  Sees dentist every 6 months?: Yes  Feet checked by healthcare provider in the last year?: Yes    Healthy Coping:  Healthy Coping Assessed Today: Yes  Emotional response to diabetes: Ready to learn, Concern for health and well-being  Informal Support system:: Spouse  Stage of change: ACTION (Actively working towards change)  Support resources: None  Patient Activation Measure Survey Score:  No flowsheet data found.    Diabetes knowledge and skills assessment:   Patient is knowledgeable in diabetes management concepts related to: Healthy Eating, Being Active, Monitoring, Taking Medication, Problem Solving, Reducing Risks and Healthy Coping    Patient needs further education on the following diabetes management concepts: No concerns.  Pt is doing well.     Based on learning  assessment above, most appropriate setting for further diabetes education would be: Individual setting.      INTERVENTIONS:    Education provided today on:  AADE Self-Care Behaviors:  Being Active:  Encouraged pt to continue exercise in winter months.  Monitoring: individual blood glucose targets and frequency of monitoring.  Pt had to change to One Touch Verio meter per his insurance.  He does not like it.  Checked for accuracy today.  No concerns.    Reducing Risks: A1C - goals, relating to blood glucose levels, how often to check    Opportunities for ongoing education and support in diabetes-self management were discussed.    Pt verbalized understanding of concepts discussed and recommendations provided today.       Education Materials Provided:  No new materials today.     ASSESSMENT:  A1c today was 6.5% which is down from 10.2% in June.  Glucose levels much improved with increase in Trulicity dose.  Weight is down 18# since increasing dose.  Pt his pleased.  He is exercising more routinely now.      Goals Addressed as of 9/20/2021                    Today    1/29/20       Patient Stated       Healthy Eating (pt-stated)   On track  50%    Added 12/2/18 by Ria Morillo, TIEN      My Goal: I will follow carbohydrate counting meal plan.     What I need to meet my goal: meal planning education, written meal plan.     I plan to meet my goal by this date: next visit.           Patient's most recent   Lab Results   Component Value Date    A1C 6.5 09/20/2021    is meeting goal of <7.0    PLAN  Continue efforts to follow a healthy, low carbohydrate diet.   Continue to get some daily exercise.  Test glucose 1x/day - alternate times.  See Patient Instructions for co-developed, patient-stated behavior change goals.  AVS printed and provided to patient today.   Follow up in six months.     Time Spent: 35 minutes  Encounter Type: Individual    Any diabetes medication dose changes were made via the CDE Protocol and  Collaborative Practice Agreement with the patient's referring provider. A copy of this encounter was shared with the provider.          Sincerely,        Ria Morillo RD

## 2021-09-20 NOTE — PROGRESS NOTES
"Diabetes Self-Management Education & Support    Presents for: Follow-up    SUBJECTIVE/OBJECTIVE:  Presents for: Follow-up  Accompanied by: Self  Diabetes education in the past 24mo: Yes  Focus of Visit: Reducing Risks, Monitoring, Assistance w/ making life changes  Diabetes type: Type 2  Date of diagnosis: 2008  Disease course: Improving  How confident are you filling out medical forms by yourself:: Quite a bit  Diabetes management related comments/concerns: No concerns  Transportation concerns: No  Difficulty affording diabetes medication?: No  Difficulty affording diabetes testing supplies?: No  Other concerns:: None  Cultural Influences/Ethnic Background:  American    Diabetes Symptoms & Complications:  Fatigue: No  Neuropathy: No  Polydipsia: No  Polyphagia: No  Polyuria: No  Visual change: No  Slow healing wounds: No  Other: No  Symptom course: Stable  Weight trend: Decreasing (Weight is down 18# since increasing dose of Trulicity June 2021.)  Complications assessed today?: No  Autonomic neuropathy: No  CVA: No  Heart disease: No  Nephropathy: No  Peripheral neuropathy: No  Foot ulcerations: No  Peripheral Vascular Disease: No  Retinopathy: No    Patient Problem List and Family Medical History reviewed for relevant medical history, current medical status, and diabetes risk factors.    Vitals:  /68   Pulse 81   Resp 16   Ht 1.784 m (5' 10.25\")   Wt 110.7 kg (244 lb)   SpO2 98%   BMI 34.76 kg/m    Estimated body mass index is 34.76 kg/m  as calculated from the following:    Height as of this encounter: 1.784 m (5' 10.25\").    Weight as of this encounter: 110.7 kg (244 lb).   Last 3 BP:   BP Readings from Last 3 Encounters:   09/20/21 118/68   07/07/21 137/79   06/16/21 131/74       History   Smoking Status     Former Smoker     Years: 25.00     Types: Cigarettes     Quit date: 1991   Smokeless Tobacco     Never Used       Labs:  Lab Results   Component Value Date    A1C 6.5 09/20/2021     Lab Results "   Component Value Date     06/16/2021     Lab Results   Component Value Date    LDL 99 12/16/2019     HDL Cholesterol   Date Value Ref Range Status   12/16/2019 30 (L) >=60 mg/dL Final   ]  GFR Estimate   Date Value Ref Range Status   06/16/2021 90 >60 mL/min/[1.73_m2] Final     Comment:     Non  GFR Calc  Starting 12/18/2018, serum creatinine based estimated GFR (eGFR) will be   calculated using the Chronic Kidney Disease Epidemiology Collaboration   (CKD-EPI) equation.       GFR Estimate If Black   Date Value Ref Range Status   06/16/2021 >90 >60 mL/min/[1.73_m2] Final     Comment:      GFR Calc  Starting 12/18/2018, serum creatinine based estimated GFR (eGFR) will be   calculated using the Chronic Kidney Disease Epidemiology Collaboration   (CKD-EPI) equation.       Lab Results   Component Value Date    CR 0.81 06/16/2021     No results found for: MICROALBUMIN    Healthy Eating:  Healthy Eating Assessed Today: Yes  Cultural/Restorationist diet restrictions?: No  Meal planning/habits: Avoiding sweets  How many times a week on average do you eat food made away from home (restaurant/take-out)?: 0  Meals include: Breakfast, Lunch, Dinner  Breakfast: 1 slice bread with cheese  Lunch: leftovers or hummus and melquiades chips  Dinner: fish, small potato, veggies  Snacks: sugar free items during the day  Beverages: Water, Milk, Diet soda, Coffee (Crystal Lite)  Has patient met with a dietitian in the past?: Yes    Being Active:  Being Active Assessed Today: Yes  Exercise:: Yes (Has E-Bike and treadmill - using E-Bike now.)  Days per week of moderate to strenuous exercise (like a brisk walk): 5  On average, minutes per day of exercise at this level: 60  How intense was your typical exercise? : Moderate (like brisk walking)  Exercise Minutes per Week: 300  Barrier to exercise: None    Monitoring:  Monitoring Assessed Today: Yes  Did patient bring glucose meter to appointment? : Yes  Blood  Glucose Meter: One Touch (Verio)  Times checking blood sugar at home (number): 2  Times checking blood sugar at home (per): Day  Blood glucose trend: Decreasing  Ucguxvl-  2 hours post [id number] with one at 202  Two week average is 132.     Taking Medications:  Diabetes Medication(s)     Incretin Mimetic Agents (GLP-1 Receptor Agonists)       dulaglutide (TRULICITY) 1.5 MG/0.5ML pen    Inject 1.5 mg Subcutaneous every 7 days        Taking Medication Assessed Today: Yes  Current Treatments: Non-insulin Injectables, Diet (Trulicity 1.5 mg weekly)  Problems taking diabetes medications regularly?: No  Diabetes medication side effects?: No    Problem Solving:  Problem Solving Assessed Today: Yes  Is the patient at risk for hypoglycemia?: Yes  Hypoglycemia Frequency: Never    Reducing Risks:  Reducing Risks Assessed Today: No  Diabetes Risks: Age over 45 years, Sedentary Lifestyle, Family History  CAD Risks: Diabetes Mellitus, Male sex, Obesity, Family history, Sedentary lifestyle, Stress  Has dilated eye exam at least once a year?: Yes (scheduled in August)  Sees dentist every 6 months?: Yes  Feet checked by healthcare provider in the last year?: Yes    Healthy Coping:  Healthy Coping Assessed Today: Yes  Emotional response to diabetes: Ready to learn, Concern for health and well-being  Informal Support system:: Spouse  Stage of change: ACTION (Actively working towards change)  Support resources: None  Patient Activation Measure Survey Score:  No flowsheet data found.    Diabetes knowledge and skills assessment:   Patient is knowledgeable in diabetes management concepts related to: Healthy Eating, Being Active, Monitoring, Taking Medication, Problem Solving, Reducing Risks and Healthy Coping    Patient needs further education on the following diabetes management concepts: No concerns.  Pt is doing well.     Based on learning assessment above, most appropriate setting for further diabetes education would be:  Individual setting.      INTERVENTIONS:    Education provided today on:  AADE Self-Care Behaviors:  Being Active:  Encouraged pt to continue exercise in winter months.  Monitoring: individual blood glucose targets and frequency of monitoring.  Pt had to change to One Touch Verio meter per his insurance.  He does not like it.  Checked for accuracy today.  No concerns.    Reducing Risks: A1C - goals, relating to blood glucose levels, how often to check    Opportunities for ongoing education and support in diabetes-self management were discussed.    Pt verbalized understanding of concepts discussed and recommendations provided today.       Education Materials Provided:  No new materials today.     ASSESSMENT:  A1c today was 6.5% which is down from 10.2% in June.  Glucose levels much improved with increase in Trulicity dose.  Weight is down 18# since increasing dose.  Pt his pleased.  He is exercising more routinely now.      Goals Addressed as of 9/20/2021                    Today    1/29/20       Patient Stated       Healthy Eating (pt-stated)   On track  50%    Added 12/2/18 by Ria Morillo RD      My Goal: I will follow carbohydrate counting meal plan.     What I need to meet my goal: meal planning education, written meal plan.     I plan to meet my goal by this date: next visit.           Patient's most recent   Lab Results   Component Value Date    A1C 6.5 09/20/2021    is meeting goal of <7.0    PLAN  Continue efforts to follow a healthy, low carbohydrate diet.   Continue to get some daily exercise.  Test glucose 1x/day - alternate times.  See Patient Instructions for co-developed, patient-stated behavior change goals.  AVS printed and provided to patient today.   Follow up in six months.     Time Spent: 35 minutes  Encounter Type: Individual    Any diabetes medication dose changes were made via the CDE Protocol and Collaborative Practice Agreement with the patient's referring provider. A copy of this encounter  was shared with the provider.

## 2021-09-20 NOTE — PATIENT INSTRUCTIONS
-Keep trying to limit foods high in carbohydrates.    -Continue to try to get 30 minutes exercise most days of the week.  -Test glucose 1x/day - alternate fasting and 2 hours after supper.  -Target levels are fasting  and 2 hours after supper < 180.  -Keep taking current dose of Trulicity.  -A1c today was 6.5% which is down from 10.2% in June.  Great job!  Goal is < 7.0%.    -Weight today was 244#.  Great job!  Optimal weight loss is 1# per week.  -Follow up in six months.    -Call with any concerns - PATRIZIA Short, [phone].

## 2021-12-08 ENCOUNTER — MEDICAL CORRESPONDENCE (OUTPATIENT)
Dept: INTERVENTIONAL RADIOLOGY/VASCULAR | Facility: HOSPITAL | Age: 71
End: 2021-12-08
Payer: COMMERCIAL

## 2022-02-14 ENCOUNTER — HOSPITAL ENCOUNTER (OUTPATIENT)
Dept: INTERVENTIONAL RADIOLOGY/VASCULAR | Facility: HOSPITAL | Age: 72
End: 2022-02-14
Attending: FAMILY MEDICINE | Admitting: RADIOLOGY
Payer: MEDICARE

## 2022-02-14 ENCOUNTER — HOSPITAL ENCOUNTER (OUTPATIENT)
Facility: HOSPITAL | Age: 72
Discharge: HOME | End: 2022-02-14
Attending: RADIOLOGY | Admitting: RADIOLOGY
Payer: MEDICARE

## 2022-02-14 DIAGNOSIS — M25.552 PAIN IN LEFT HIP: ICD-10-CM

## 2022-02-14 PROCEDURE — 250N000009 HC RX 250: Performed by: RADIOLOGY

## 2022-02-14 PROCEDURE — 255N000002 HC RX 255 OP 636: Performed by: RADIOLOGY

## 2022-02-14 PROCEDURE — 250N000011 HC RX IP 250 OP 636: Performed by: RADIOLOGY

## 2022-02-14 PROCEDURE — 77002 NEEDLE LOCALIZATION BY XRAY: CPT

## 2022-02-14 RX ORDER — TRIAMCINOLONE ACETONIDE 40 MG/ML
40 INJECTION, SUSPENSION INTRA-ARTICULAR; INTRAMUSCULAR ONCE
Status: COMPLETED | OUTPATIENT
Start: 2022-02-14 | End: 2022-02-14

## 2022-02-14 RX ORDER — LIDOCAINE HYDROCHLORIDE 10 MG/ML
10 INJECTION, SOLUTION EPIDURAL; INFILTRATION; INTRACAUDAL; PERINEURAL ONCE
Status: COMPLETED | OUTPATIENT
Start: 2022-02-14 | End: 2022-02-14

## 2022-02-14 RX ORDER — IOPAMIDOL 612 MG/ML
50 INJECTION, SOLUTION INTRAVASCULAR ONCE
Status: COMPLETED | OUTPATIENT
Start: 2022-02-14 | End: 2022-02-14

## 2022-02-14 RX ADMIN — TRIAMCINOLONE ACETONIDE 40 MG: 40 INJECTION, SUSPENSION INTRA-ARTICULAR; INTRAMUSCULAR at 14:54

## 2022-02-14 RX ADMIN — LIDOCAINE HYDROCHLORIDE 5 ML: 10 INJECTION, SOLUTION EPIDURAL; INFILTRATION; INTRACAUDAL; PERINEURAL at 14:54

## 2022-02-14 RX ADMIN — IOPAMIDOL 2 ML: 612 INJECTION, SOLUTION INTRAVENOUS at 14:55

## 2022-02-14 NOTE — DISCHARGE INSTRUCTIONS
Cell number on file:    Telephone Information:   Mobile [phone]     Is it ok to leave a message at this number(s)? Yes    Dr. Garcia completed your procedure on 2/14/2022.    Current Pain Level (0-10 Scale): 3/10  Post Pain Level (0-10):  0/10    Radiology Discharge instructions for Steroid Injection    Activity Level:     Do not do any heavy activity or exercise for 24 hours.   Do not drive for 4 hours after your injection.  Diet:   Return to your normal diet.  Medications:   If you have stopped taking your Aspirin, Coumadin/Warfarin, Ibuprofen, or any   other blood thinner for this procedure you may resume in the morning unless   your primary care provider has given you other instructions.    Diabetics may see an increase in blood sugar after steroid injections. If you are concerned about your blood sugar, please contact your family doctor.    Site Care:  Remove the bandage and bathe or shower the morning after the procedure.      Please allow two weeks to experience improvement in your pain.  If you have any further issues, please contact your provider.    Call your Primary Care Provider if you have the following (if your primary care provider is not available please seek emergency care):   Nausea with vomiting   Severe headache   Drowsiness or confusion   Redness or drainage at the injection or puncture site   Temperature over 101 degrees F   Other concerns   Worsening back pain   Stiff neck

## 2022-03-21 ENCOUNTER — HOSPITAL ENCOUNTER (OUTPATIENT)
Dept: EDUCATION SERVICES | Facility: HOSPITAL | Age: 72
Discharge: HOME | End: 2022-03-21
Attending: NURSE PRACTITIONER | Admitting: FAMILY MEDICINE
Payer: MEDICARE

## 2022-03-21 VITALS
HEIGHT: 70 IN | HEART RATE: 80 BPM | DIASTOLIC BLOOD PRESSURE: 75 MMHG | OXYGEN SATURATION: 98 % | WEIGHT: 256.5 LBS | SYSTOLIC BLOOD PRESSURE: 175 MMHG | RESPIRATION RATE: 16 BRPM | BODY MASS INDEX: 36.72 KG/M2

## 2022-03-21 DIAGNOSIS — E11.65 TYPE 2 DIABETES MELLITUS WITH HYPERGLYCEMIA, WITHOUT LONG-TERM CURRENT USE OF INSULIN (H): Primary | ICD-10-CM

## 2022-03-21 LAB — HBA1C MFR BLD: 10.2 % (ref 0–5.7)

## 2022-03-21 PROCEDURE — 83036 HEMOGLOBIN GLYCOSYLATED A1C: CPT | Performed by: NURSE PRACTITIONER

## 2022-03-21 PROCEDURE — G0108 DIAB MANAGE TRN  PER INDIV: HCPCS | Performed by: DIETITIAN, REGISTERED

## 2022-03-21 ASSESSMENT — PAIN SCALES - GENERAL: PAINLEVEL: NO PAIN (0)

## 2022-03-21 NOTE — LETTER
"    3/21/2022        RE: Donis Watt  [address]  [place] [address]        Abnormal VS:    Problem: I spoke to Aurelia Palomares over the phone regarding the pt's elevated BP. Pt has no lightheadedness, HA, CP, chest tightness or chest pressure.  Plan: Provider recommended that pt be seen in UC, if refuses he should see his primary within the next week.  Pt notified, he declined UC visit. Pt will check his BP's at home and is advised to see primary within the next week. Pt satisfied with plan.    Rosalina Velazquez LPN          Diabetes Self-Management Education & Support    Presents for: Individual review    SUBJECTIVE/OBJECTIVE:  Presents for: Individual review  Accompanied by: Self  Diabetes education in the past 24mo: Yes  Focus of Visit: Monitoring, Assistance w/ making life changes  Diabetes type: Type 2  Date of diagnosis: 2008  Disease course: Worsening  How confident are you filling out medical forms by yourself:: Quite a bit  Diabetes management related comments/concerns: Glucose levels increased with steroids  Transportation concerns: No  Difficulty affording diabetes medication?: No  Difficulty affording diabetes testing supplies?: No  Other concerns:: None  Cultural Influences/Ethnic Background:  American    Diabetes Symptoms & Complications:  Fatigue: No  Neuropathy: No  Polydipsia: No  Polyphagia: No  Polyuria: No  Visual change: No  Slow healing wounds: No  Other: No  Symptom course: Stable  Weight trend: Increasing (Weight is up 13# since last visit Sept 2021.)  Complications assessed today?: No  Autonomic neuropathy: No  CVA: No  Heart disease: No  Nephropathy: No  Peripheral neuropathy: No  Foot ulcerations: No  Peripheral Vascular Disease: No  Retinopathy: No    Patient Problem List and Family Medical History reviewed for relevant medical history, current medical status, and diabetes risk factors.    Vitals:  /75   Pulse 80   Resp 16   Ht 1.784 m (5' 10.25\")   Wt 116.3 kg (256 " "lb 8 oz)   SpO2 98%   BMI 36.54 kg/m    Estimated body mass index is 36.54 kg/m  as calculated from the following:    Height as of this encounter: 1.784 m (5' 10.25\").    Weight as of this encounter: 116.3 kg (256 lb 8 oz).   Last 3 BP:   BP Readings from Last 3 Encounters:   03/21/22 175/75   09/20/21 118/68   07/07/21 137/79       History   Smoking Status     Former Smoker     Years: 25.00     Types: Cigarettes     Quit date: 1991   Smokeless Tobacco     Never Used       Labs:  Lab Results   Component Value Date    A1C 10.2 03/21/2022     Lab Results   Component Value Date     06/16/2021     Lab Results   Component Value Date    LDL 99 12/16/2019     HDL Cholesterol   Date Value Ref Range Status   12/16/2019 30 (L) >=60 mg/dL Final   ]  GFR Estimate   Date Value Ref Range Status   06/16/2021 90 >60 mL/min/[1.73_m2] Final     Comment:     Non  GFR Calc  Starting 12/18/2018, serum creatinine based estimated GFR (eGFR) will be   calculated using the Chronic Kidney Disease Epidemiology Collaboration   (CKD-EPI) equation.       GFR Estimate If Black   Date Value Ref Range Status   06/16/2021 >90 >60 mL/min/[1.73_m2] Final     Comment:      GFR Calc  Starting 12/18/2018, serum creatinine based estimated GFR (eGFR) will be   calculated using the Chronic Kidney Disease Epidemiology Collaboration   (CKD-EPI) equation.       Lab Results   Component Value Date    CR 0.81 06/16/2021     No results found for: MICROALBUMIN    Healthy Eating:  Healthy Eating Assessed Today: Yes  Cultural/Islam diet restrictions?: No  Meal planning/habits: Avoiding sweets  How many times a week on average do you eat food made away from home (restaurant/take-out)?: 0  Meals include: Breakfast, Lunch, Dinner  Breakfast: 2 slices raisin toast  Lunch: bagel with egg/cheese  Dinner: meat, potatoes, salad  Snacks: sugar free items during the day, 2 clementines  Beverages: Water, Milk, Diet soda, Coffee " (Brenda Lockhart)  Has patient met with a dietitian in the past?: Yes    Being Active:  Being Active Assessed Today: Yes  Exercise:: Currently not exercising (Has E-Bike and treadmill)  Barrier to exercise: Physical limitation (Has hip and back issues - cannot use treadmill but feels can use e-bike when weather improves.)    Monitoring:  Monitoring Assessed Today: Yes  Did patient bring glucose meter to appointment? : Yes  Blood Glucose Meter: One Touch (Verio)  Times checking blood sugar at home (number): 1  Times checking blood sugar at home (per): Day  Blood glucose trend: Increasing  Pt tests only fasting- 196-288.    Taking Medications:  Diabetes Medication(s)     Incretin Mimetic Agents (GLP-1 Receptor Agonists)       dulaglutide (TRULICITY) 1.5 MG/0.5ML pen    Inject 1.5 mg Subcutaneous every 7 days        Taking Medication Assessed Today: Yes  Current Treatments: Non-insulin Injectables, Diet (Trulicity 1.5 mg weekly)  Problems taking diabetes medications regularly?: No  Diabetes medication side effects?: No    Problem Solving:  Problem Solving Assessed Today: Yes  Is the patient at risk for hypoglycemia?: No  Hypoglycemia Frequency: Never    Reducing Risks:  Reducing Risks Assessed Today: No  Diabetes Risks: Age over 45 years, Sedentary Lifestyle, Family History  CAD Risks: Diabetes Mellitus, Male sex, Obesity, Family history, Sedentary lifestyle, Stress  Has dilated eye exam at least once a year?: Yes (scheduled in August)  Sees dentist every 6 months?: Yes    Healthy Coping:  Healthy Coping Assessed Today: Yes  Emotional response to diabetes: Ready to learn, Concern for health and well-being  Informal Support system:: Spouse  Stage of change: ACTION (Actively working towards change)  Support resources: None  Patient Activation Measure Survey Score:  No flowsheet data found.    Diabetes knowledge and skills assessment:   Patient is knowledgeable in diabetes management concepts related to: Healthy Eating,  Being Active, Monitoring, Taking Medication, Problem Solving, Reducing Risks and Healthy Coping    Patient needs further education on the following diabetes management concepts: Reducing Risks    Based on learning assessment above, most appropriate setting for further diabetes education would be: Individual setting.      INTERVENTIONS:    Education provided today on:  AADE Self-Care Behaviors:  Healthy Eating:  Weight loss   Being Active: relationship to blood glucose.  Encouraged him to use E-bike as able once weather improves.    Medications:  Pt declines increase in Trulicity dose today - wants to try to add exercise and lose weight.   Monitoring: individual blood glucose targets and frequency of monitoring  Reducing Risks: A1C - goals, relating to blood glucose levels, how often to check and blood pressure and goals    Opportunities for ongoing education and support in diabetes-self management were discussed.    Pt verbalized understanding of concepts discussed and recommendations provided today.       Education Materials Provided:  No new materials today.     ASSESSMENT:  A1c today was 10.2% which is up from previous A1c of 6.5% in Sept.  Pt completed a 5-6 week Prednisone taper that began mid-January.  He also had cortisone injections in thumb and hip with the last being February 14th.  He states glucose levels have increased with steroids and he has had weight gain of 13# also.  BP high on two checks today.      Goals Addressed as of 3/21/2022                    Today    9/20/21       Patient Stated       Healthy Eating (pt-stated)   On track  On track    Added 12/2/18 by Ria Morillo RD      My Goal: I will follow carbohydrate counting meal plan.     What I need to meet my goal: meal planning education, written meal plan.     I plan to meet my goal by this date: next visit.           Patient's most recent   Lab Results   Component Value Date    A1C 10.2 03/21/2022    is not meeting goal of  <7.0    PLAN  Continue to work on healthy, low carbohydrate food choices.  Begin using E-bike as able.  Test glucose 1x/day - alternate times.  See provider regarding BP.   See Patient Instructions for co-developed, patient-stated behavior change goals.  AVS printed and provided to patient today.   Follow up in three months for glucose review/A1c check.  If A1c remains elevated will need med adjust - declined today.     Time Spent: 40 minutes  Encounter Type: Individual    Any diabetes medication dose changes were made via the CDE Protocol and Collaborative Practice Agreement with the patient's referring provider. A copy of this encounter was shared with the provider.          Sincerely,        Ria Morillo RD

## 2022-03-21 NOTE — PROGRESS NOTES
Abnormal VS:    Problem: I spoke to Aurelia Palomares over the phone regarding the pt's elevated BP. Pt has no lightheadedness, HA, CP, chest tightness or chest pressure.  Plan: Provider recommended that pt be seen in UC, if refuses he should see his primary within the next week.  Pt notified, he declined UC visit. Pt will check his BP's at home and is advised to see primary within the next week. Pt satisfied with plan.    Rosalina Velazquez LPN

## 2022-03-21 NOTE — PATIENT INSTRUCTIONS
-Follow healthy, low carbohydrate meal plan.  -Begin using your E-bike when able.   -Keep testing glucose 1x/day.  Good times are fasting, before a meal or 2 hours after a meal.  -Target levels are fasting or before a meal , 2 hours after a meal < 180.  -Keep taking current dose of Trulicity for now.  -A1c today was 10.2%.  Goal is < 7.0%.   -Weight today was 256#.  Weight is up 13# since last visit in Sept 2021.   -Follow up in three months for glucose review and A1c check.  -Call with any concerns - PATRIZIA Short, [facility]  [phone].

## 2022-03-21 NOTE — PROGRESS NOTES
"Diabetes Self-Management Education & Support    Presents for: Individual review    SUBJECTIVE/OBJECTIVE:  Presents for: Individual review  Accompanied by: Self  Diabetes education in the past 24mo: Yes  Focus of Visit: Monitoring, Assistance w/ making life changes  Diabetes type: Type 2  Date of diagnosis: 2008  Disease course: Worsening  How confident are you filling out medical forms by yourself:: Quite a bit  Diabetes management related comments/concerns: Glucose levels increased with steroids  Transportation concerns: No  Difficulty affording diabetes medication?: No  Difficulty affording diabetes testing supplies?: No  Other concerns:: None  Cultural Influences/Ethnic Background:  American    Diabetes Symptoms & Complications:  Fatigue: No  Neuropathy: No  Polydipsia: No  Polyphagia: No  Polyuria: No  Visual change: No  Slow healing wounds: No  Other: No  Symptom course: Stable  Weight trend: Increasing (Weight is up 13# since last visit Sept 2021.)  Complications assessed today?: No  Autonomic neuropathy: No  CVA: No  Heart disease: No  Nephropathy: No  Peripheral neuropathy: No  Foot ulcerations: No  Peripheral Vascular Disease: No  Retinopathy: No    Patient Problem List and Family Medical History reviewed for relevant medical history, current medical status, and diabetes risk factors.    Vitals:  /75   Pulse 80   Resp 16   Ht 1.784 m (5' 10.25\")   Wt 116.3 kg (256 lb 8 oz)   SpO2 98%   BMI 36.54 kg/m    Estimated body mass index is 36.54 kg/m  as calculated from the following:    Height as of this encounter: 1.784 m (5' 10.25\").    Weight as of this encounter: 116.3 kg (256 lb 8 oz).   Last 3 BP:   BP Readings from Last 3 Encounters:   03/21/22 175/75   09/20/21 118/68   07/07/21 137/79       History   Smoking Status     Former Smoker     Years: 25.00     Types: Cigarettes     Quit date: 1991   Smokeless Tobacco     Never Used       Labs:  Lab Results   Component Value Date    A1C 10.2 " 03/21/2022     Lab Results   Component Value Date     06/16/2021     Lab Results   Component Value Date    LDL 99 12/16/2019     HDL Cholesterol   Date Value Ref Range Status   12/16/2019 30 (L) >=60 mg/dL Final   ]  GFR Estimate   Date Value Ref Range Status   06/16/2021 90 >60 mL/min/[1.73_m2] Final     Comment:     Non  GFR Calc  Starting 12/18/2018, serum creatinine based estimated GFR (eGFR) will be   calculated using the Chronic Kidney Disease Epidemiology Collaboration   (CKD-EPI) equation.       GFR Estimate If Black   Date Value Ref Range Status   06/16/2021 >90 >60 mL/min/[1.73_m2] Final     Comment:      GFR Calc  Starting 12/18/2018, serum creatinine based estimated GFR (eGFR) will be   calculated using the Chronic Kidney Disease Epidemiology Collaboration   (CKD-EPI) equation.       Lab Results   Component Value Date    CR 0.81 06/16/2021     No results found for: MICROALBUMIN    Healthy Eating:  Healthy Eating Assessed Today: Yes  Cultural/Congregational diet restrictions?: No  Meal planning/habits: Avoiding sweets  How many times a week on average do you eat food made away from home (restaurant/take-out)?: 0  Meals include: Breakfast, Lunch, Dinner  Breakfast: 2 slices raisin toast  Lunch: bagel with egg/cheese  Dinner: meat, potatoes, salad  Snacks: sugar free items during the day, 2 clementines  Beverages: Water, Milk, Diet soda, Coffee (Crystal Lite)  Has patient met with a dietitian in the past?: Yes    Being Active:  Being Active Assessed Today: Yes  Exercise:: Currently not exercising (Has E-Bike and treadmill)  Barrier to exercise: Physical limitation (Has hip and back issues - cannot use treadmill but feels can use e-bike when weather improves.)    Monitoring:  Monitoring Assessed Today: Yes  Did patient bring glucose meter to appointment? : Yes  Blood Glucose Meter: One Touch (Verio)  Times checking blood sugar at home (number): 1  Times checking blood sugar  at home (per): Day  Blood glucose trend: Increasing  Pt tests only fasting- 196-288.    Taking Medications:  Diabetes Medication(s)     Incretin Mimetic Agents (GLP-1 Receptor Agonists)       dulaglutide (TRULICITY) 1.5 MG/0.5ML pen    Inject 1.5 mg Subcutaneous every 7 days        Taking Medication Assessed Today: Yes  Current Treatments: Non-insulin Injectables, Diet (Trulicity 1.5 mg weekly)  Problems taking diabetes medications regularly?: No  Diabetes medication side effects?: No    Problem Solving:  Problem Solving Assessed Today: Yes  Is the patient at risk for hypoglycemia?: No  Hypoglycemia Frequency: Never    Reducing Risks:  Reducing Risks Assessed Today: No  Diabetes Risks: Age over 45 years, Sedentary Lifestyle, Family History  CAD Risks: Diabetes Mellitus, Male sex, Obesity, Family history, Sedentary lifestyle, Stress  Has dilated eye exam at least once a year?: Yes (scheduled in August)  Sees dentist every 6 months?: Yes    Healthy Coping:  Healthy Coping Assessed Today: Yes  Emotional response to diabetes: Ready to learn, Concern for health and well-being  Informal Support system:: Spouse  Stage of change: ACTION (Actively working towards change)  Support resources: None  Patient Activation Measure Survey Score:  No flowsheet data found.    Diabetes knowledge and skills assessment:   Patient is knowledgeable in diabetes management concepts related to: Healthy Eating, Being Active, Monitoring, Taking Medication, Problem Solving, Reducing Risks and Healthy Coping    Patient needs further education on the following diabetes management concepts: Reducing Risks    Based on learning assessment above, most appropriate setting for further diabetes education would be: Individual setting.      INTERVENTIONS:    Education provided today on:  AADE Self-Care Behaviors:  Healthy Eating:  Weight loss   Being Active: relationship to blood glucose.  Encouraged him to use E-bike as able once weather improves.     Medications:  Pt declines increase in Trulicity dose today - wants to try to add exercise and lose weight.   Monitoring: individual blood glucose targets and frequency of monitoring  Reducing Risks: A1C - goals, relating to blood glucose levels, how often to check and blood pressure and goals    Opportunities for ongoing education and support in diabetes-self management were discussed.    Pt verbalized understanding of concepts discussed and recommendations provided today.       Education Materials Provided:  No new materials today.     ASSESSMENT:  A1c today was 10.2% which is up from previous A1c of 6.5% in Sept.  Pt completed a 5-6 week Prednisone taper that began mid-January.  He also had cortisone injections in thumb and hip with the last being February 14th.  He states glucose levels have increased with steroids and he has had weight gain of 13# also.  BP high on two checks today.      Goals Addressed as of 3/21/2022                    Today    9/20/21       Patient Stated       Healthy Eating (pt-stated)   On track  On track    Added 12/2/18 by Ria Morillo RD      My Goal: I will follow carbohydrate counting meal plan.     What I need to meet my goal: meal planning education, written meal plan.     I plan to meet my goal by this date: next visit.           Patient's most recent   Lab Results   Component Value Date    A1C 10.2 03/21/2022    is not meeting goal of <7.0    PLAN  Continue to work on healthy, low carbohydrate food choices.  Begin using E-bike as able.  Test glucose 1x/day - alternate times.  See provider regarding BP.   See Patient Instructions for co-developed, patient-stated behavior change goals.  AVS printed and provided to patient today.   Follow up in three months for glucose review/A1c check.  If A1c remains elevated will need med adjust - declined today.     Time Spent: 40 minutes  Encounter Type: Individual    Any diabetes medication dose changes were made via the CDE Protocol and  Collaborative Practice Agreement with the patient's referring provider. A copy of this encounter was shared with the provider.

## 2022-06-22 ENCOUNTER — HOSPITAL ENCOUNTER (OUTPATIENT)
Dept: EDUCATION SERVICES | Facility: HOSPITAL | Age: 72
Discharge: HOME | End: 2022-06-22
Attending: DIETITIAN, REGISTERED | Admitting: FAMILY MEDICINE
Payer: MEDICARE

## 2022-06-22 VITALS
SYSTOLIC BLOOD PRESSURE: 137 MMHG | BODY MASS INDEX: 35.58 KG/M2 | DIASTOLIC BLOOD PRESSURE: 76 MMHG | OXYGEN SATURATION: 98 % | HEART RATE: 76 BPM | HEIGHT: 70 IN | WEIGHT: 248.5 LBS

## 2022-06-22 DIAGNOSIS — E11.9 TYPE 2 DIABETES MELLITUS WITHOUT COMPLICATION, WITHOUT LONG-TERM CURRENT USE OF INSULIN (H): Primary | ICD-10-CM

## 2022-06-22 LAB — HBA1C MFR BLD: 7 % (ref 0–5.7)

## 2022-06-22 PROCEDURE — 83036 HEMOGLOBIN GLYCOSYLATED A1C: CPT | Performed by: NURSE PRACTITIONER

## 2022-06-22 PROCEDURE — G0108 DIAB MANAGE TRN  PER INDIV: HCPCS | Performed by: DIETITIAN, REGISTERED

## 2022-06-22 ASSESSMENT — ANXIETY QUESTIONNAIRES
2. NOT BEING ABLE TO STOP OR CONTROL WORRYING: NOT AT ALL
4. TROUBLE RELAXING: NOT AT ALL
GAD7 TOTAL SCORE: 1
3. WORRYING TOO MUCH ABOUT DIFFERENT THINGS: NOT AT ALL
GAD7 TOTAL SCORE: 1
IF YOU CHECKED OFF ANY PROBLEMS ON THIS QUESTIONNAIRE, HOW DIFFICULT HAVE THESE PROBLEMS MADE IT FOR YOU TO DO YOUR WORK, TAKE CARE OF THINGS AT HOME, OR GET ALONG WITH OTHER PEOPLE: NOT DIFFICULT AT ALL
6. BECOMING EASILY ANNOYED OR IRRITABLE: SEVERAL DAYS
1. FEELING NERVOUS, ANXIOUS, OR ON EDGE: NOT AT ALL
7. FEELING AFRAID AS IF SOMETHING AWFUL MIGHT HAPPEN: NOT AT ALL
5. BEING SO RESTLESS THAT IT IS HARD TO SIT STILL: NOT AT ALL

## 2022-06-22 ASSESSMENT — PATIENT HEALTH QUESTIONNAIRE - PHQ9: SUM OF ALL RESPONSES TO PHQ QUESTIONS 1-9: 1

## 2022-06-22 ASSESSMENT — PAIN SCALES - GENERAL: PAINLEVEL: MILD PAIN (2)

## 2022-06-22 NOTE — PROGRESS NOTES
"Diabetes Self-Management Education & Support    Presents for: Individual review    SUBJECTIVE/OBJECTIVE:  Presents for: Individual review  Accompanied by: Self  Diabetes education in the past 24mo: Yes  Focus of Visit: Monitoring, Assistance w/ making life changes  Diabetes type: Type 2  Date of diagnosis: 2008  Disease course: Improving  How confident are you filling out medical forms by yourself:: Quite a bit  Diabetes management related comments/concerns: No concerns.  Transportation concerns: No  Difficulty affording diabetes medication?: No  Difficulty affording diabetes testing supplies?: No  Other concerns:: None  Cultural Influences/Ethnic Background:  Not  or     Diabetes Symptoms & Complications:  Fatigue: No  Neuropathy: No  Polydipsia: No  Polyphagia: No  Polyuria: No  Visual change: No  Slow healing wounds: No  Other: No  Symptom course: Stable  Weight trend: Decreasing (Weight is down 8# since last visit 3/21/22.)  Complications assessed today?: Yes  Autonomic neuropathy: No  CVA: No  Heart disease: No  Nephropathy: No  Peripheral neuropathy: No  Foot ulcerations: No  Peripheral Vascular Disease: No  Retinopathy: No    Patient Problem List and Family Medical History reviewed for relevant medical history, current medical status, and diabetes risk factors.    Vitals:  /76   Pulse 76   Ht 1.784 m (5' 10.25\")   Wt 112.7 kg (248 lb 8 oz)   SpO2 98%   BMI 35.40 kg/m    Estimated body mass index is 35.4 kg/m  as calculated from the following:    Height as of this encounter: 1.784 m (5' 10.25\").    Weight as of this encounter: 112.7 kg (248 lb 8 oz).   Last 3 BP:   BP Readings from Last 3 Encounters:   06/22/22 137/76   03/21/22 175/75   09/20/21 118/68       History   Smoking Status     Former Smoker     Years: 25.00     Types: Cigarettes     Quit date: 1991   Smokeless Tobacco     Never Used       Labs:  Lab Results   Component Value Date    A1C 7.0 06/22/2022     Lab Results "   Component Value Date     06/16/2021     Lab Results   Component Value Date    LDL 99 12/16/2019     HDL Cholesterol   Date Value Ref Range Status   12/16/2019 30 (L) >=60 mg/dL Final   ]  GFR Estimate   Date Value Ref Range Status   06/16/2021 90 >60 mL/min/[1.73_m2] Final     Comment:     Non  GFR Calc  Starting 12/18/2018, serum creatinine based estimated GFR (eGFR) will be   calculated using the Chronic Kidney Disease Epidemiology Collaboration   (CKD-EPI) equation.       GFR Estimate If Black   Date Value Ref Range Status   06/16/2021 >90 >60 mL/min/[1.73_m2] Final     Comment:      GFR Calc  Starting 12/18/2018, serum creatinine based estimated GFR (eGFR) will be   calculated using the Chronic Kidney Disease Epidemiology Collaboration   (CKD-EPI) equation.       Lab Results   Component Value Date    CR 0.81 06/16/2021     No results found for: MICROALBUMIN    Healthy Eating:  Healthy Eating Assessed Today: Yes  Cultural/Protestant diet restrictions?: No  Meal planning/habits: Avoiding sweets  How many times a week on average do you eat food made away from home (restaurant/take-out)?: 0  Meals include: Breakfast, Lunch, Dinner  Breakfast: bagel with egg/cheese  Lunch: 1/2 sandwich, small bag chips  Dinner: 1.5 pasties, salad or spaghetti, 3 small toast  Snacks: sugar free popsicles, sugar free cookies  Beverages: Water, Milk, Diet soda, Coffee (Crystal Lite)  Has patient met with a dietitian in the past?: Yes    Being Active:  Being Active Assessed Today: Yes  Exercise:: Yes (E-bike)  Days per week of moderate to strenuous exercise (like a brisk walk): 4  On average, minutes per day of exercise at this level: 50  How intense was your typical exercise? : Moderate (like brisk walking)  Exercise Minutes per Week: 200  Barrier to exercise: Physical limitation (Has hip/back issues)    Monitoring:  Monitoring Assessed Today: Yes  Did patient bring glucose meter to appointment? :  Yes  Blood Glucose Meter: One Touch (Verio)  Times checking blood sugar at home (number): 1  Times checking blood sugar at home (per): Day  Blood glucose trend: Decreasing  Fasting-161, 151, 144, 128, 134, 145, 134, 132, 142, 110, 152, 130, 139, 118    Taking Medications:  Diabetes Medication(s)     Incretin Mimetic Agents (GLP-1 Receptor Agonists)       TRULICITY 1.5 MG/0.5ML pen    INJECT 0.5ML (=1.5 MG)     SUBCUTANEOUSLY EVERY 7 DAYS        Taking Medication Assessed Today: Yes  Current Treatments: Non-insulin Injectables, Diet (Trulicity 1.5 mg weekly)  Problems taking diabetes medications regularly?: No  Diabetes medication side effects?: No    Problem Solving:  Problem Solving Assessed Today: Yes  Is the patient at risk for hypoglycemia?: No  Hypoglycemia Frequency: Never  Is the patient at risk for DKA?: No    Reducing Risks:  Reducing Risks Assessed Today: No  Diabetes Risks: Age over 45 years, Sedentary Lifestyle, Family History  CAD Risks: Diabetes Mellitus, Male sex, Obesity, Family history, Sedentary lifestyle, Stress  Has dilated eye exam at least once a year?: Yes  Sees dentist every 6 months?: Yes  Feet checked by healthcare provider in the last year?: No    Healthy Coping:  Healthy Coping Assessed Today: Yes  Emotional response to diabetes: Ready to learn, Concern for health and well-being  Informal Support system:: Spouse  Stage of change: MAINTENANCE (Working to maintain change, with risk of relapse)  Support resources: None  Patient Activation Measure Survey Score:  No flowsheet data found.    Diabetes knowledge and skills assessment:   Patient is knowledgeable in diabetes management concepts related to: Healthy Eating, Being Active, Monitoring, Taking Medication, Problem Solving, Reducing Risks and Healthy Coping    Patient needs further education on the following diabetes management concepts:  No concerns.  Pt is doing well.     Based on learning assessment above, most appropriate setting for  further diabetes education would be: Individual setting.      INTERVENTIONS:    Education provided today on:  AADE Self-Care Behaviors:  Reducing Risks: foot care, A1C - goals, relating to blood glucose levels, how often to check and blood pressure and goals    Opportunities for ongoing education and support in diabetes-self management were discussed.    Pt verbalized understanding of concepts discussed and recommendations provided today.       Education Materials Provided:  No new materials today.     ASSESSMENT:  A1c today was 7.0% which is down from 10.2% at last check in March.  Pt had steroids prior to check in March (oral and injected) and is convinced that caused increase.  He has started riding his E-bike for exercise and has lost 8#.       Goals Addressed as of 6/22/2022                    Today    3/21/22       Patient Stated       Healthy Eating (pt-stated)   On track  On track    Added 12/2/18 by Ria Morillo RD      My Goal: I will follow carbohydrate counting meal plan.     What I need to meet my goal: meal planning education, written meal plan.     I plan to meet my goal by this date: next visit.             Patient's most recent   Lab Results   Component Value Date    A1C 7.0 06/22/2022    is meeting goal of <7.5% for age.     PLAN  Continue to work on making healthy, low carbohydrate food choices.  Exercise goal is 150 minutes weekly.  Test glucose 1x/day - alternate times.   See Patient Instructions for co-developed, patient-stated behavior change goals.  AVS printed and provided to patient today.   Follow up in three months for glucose review/A1c check.     Time Spent: 40 minutes  Encounter Type: Individual    Any diabetes medication dose changes were made via the CDE Protocol and Collaborative Practice Agreement with the patient's referring provider. A copy of this encounter was shared with the provider.

## 2022-06-22 NOTE — LETTER
"    6/22/2022        RE: Donis Watt  [address]  [place] [address]        Diabetes Self-Management Education & Support    Presents for: Individual review    SUBJECTIVE/OBJECTIVE:  Presents for: Individual review  Accompanied by: Self  Diabetes education in the past 24mo: Yes  Focus of Visit: Monitoring, Assistance w/ making life changes  Diabetes type: Type 2  Date of diagnosis: 2008  Disease course: Improving  How confident are you filling out medical forms by yourself:: Quite a bit  Diabetes management related comments/concerns: No concerns.  Transportation concerns: No  Difficulty affording diabetes medication?: No  Difficulty affording diabetes testing supplies?: No  Other concerns:: None  Cultural Influences/Ethnic Background:  Not  or     Diabetes Symptoms & Complications:  Fatigue: No  Neuropathy: No  Polydipsia: No  Polyphagia: No  Polyuria: No  Visual change: No  Slow healing wounds: No  Other: No  Symptom course: Stable  Weight trend: Decreasing (Weight is down 8# since last visit 3/21/22.)  Complications assessed today?: Yes  Autonomic neuropathy: No  CVA: No  Heart disease: No  Nephropathy: No  Peripheral neuropathy: No  Foot ulcerations: No  Peripheral Vascular Disease: No  Retinopathy: No    Patient Problem List and Family Medical History reviewed for relevant medical history, current medical status, and diabetes risk factors.    Vitals:  /76   Pulse 76   Ht 1.784 m (5' 10.25\")   Wt 112.7 kg (248 lb 8 oz)   SpO2 98%   BMI 35.40 kg/m    Estimated body mass index is 35.4 kg/m  as calculated from the following:    Height as of this encounter: 1.784 m (5' 10.25\").    Weight as of this encounter: 112.7 kg (248 lb 8 oz).   Last 3 BP:   BP Readings from Last 3 Encounters:   06/22/22 137/76   03/21/22 175/75   09/20/21 118/68       History   Smoking Status     Former Smoker     Years: 25.00     Types: Cigarettes     Quit date: 1991   Smokeless Tobacco     Never " Used       Labs:  Lab Results   Component Value Date    A1C 7.0 06/22/2022     Lab Results   Component Value Date     06/16/2021     Lab Results   Component Value Date    LDL 99 12/16/2019     HDL Cholesterol   Date Value Ref Range Status   12/16/2019 30 (L) >=60 mg/dL Final   ]  GFR Estimate   Date Value Ref Range Status   06/16/2021 90 >60 mL/min/[1.73_m2] Final     Comment:     Non  GFR Calc  Starting 12/18/2018, serum creatinine based estimated GFR (eGFR) will be   calculated using the Chronic Kidney Disease Epidemiology Collaboration   (CKD-EPI) equation.       GFR Estimate If Black   Date Value Ref Range Status   06/16/2021 >90 >60 mL/min/[1.73_m2] Final     Comment:      GFR Calc  Starting 12/18/2018, serum creatinine based estimated GFR (eGFR) will be   calculated using the Chronic Kidney Disease Epidemiology Collaboration   (CKD-EPI) equation.       Lab Results   Component Value Date    CR 0.81 06/16/2021     No results found for: MICROALBUMIN    Healthy Eating:  Healthy Eating Assessed Today: Yes  Cultural/Latter-day diet restrictions?: No  Meal planning/habits: Avoiding sweets  How many times a week on average do you eat food made away from home (restaurant/take-out)?: 0  Meals include: Breakfast, Lunch, Dinner  Breakfast: bagel with egg/cheese  Lunch: 1/2 sandwich, small bag chips  Dinner: 1.5 pasties, salad or spaghetti, 3 small toast  Snacks: sugar free popsicles, sugar free cookies  Beverages: Water, Milk, Diet soda, Coffee (Crystal Lite)  Has patient met with a dietitian in the past?: Yes    Being Active:  Being Active Assessed Today: Yes  Exercise:: Yes (E-bike)  Days per week of moderate to strenuous exercise (like a brisk walk): 4  On average, minutes per day of exercise at this level: 50  How intense was your typical exercise? : Moderate (like brisk walking)  Exercise Minutes per Week: 200  Barrier to exercise: Physical limitation (Has hip/back  issues)    Monitoring:  Monitoring Assessed Today: Yes  Did patient bring glucose meter to appointment? : Yes  Blood Glucose Meter: One Touch (Verio)  Times checking blood sugar at home (number): 1  Times checking blood sugar at home (per): Day  Blood glucose trend: Decreasing  Fasting-161, 151, 144, 128, 134, 145, 134, 132, 142, 110, 152, 130, 139, 118    Taking Medications:  Diabetes Medication(s)     Incretin Mimetic Agents (GLP-1 Receptor Agonists)       TRULICITY 1.5 MG/0.5ML pen    INJECT 0.5ML (=1.5 MG)     SUBCUTANEOUSLY EVERY 7 DAYS        Taking Medication Assessed Today: Yes  Current Treatments: Non-insulin Injectables, Diet (Trulicity 1.5 mg weekly)  Problems taking diabetes medications regularly?: No  Diabetes medication side effects?: No    Problem Solving:  Problem Solving Assessed Today: Yes  Is the patient at risk for hypoglycemia?: No  Hypoglycemia Frequency: Never  Is the patient at risk for DKA?: No    Reducing Risks:  Reducing Risks Assessed Today: No  Diabetes Risks: Age over 45 years, Sedentary Lifestyle, Family History  CAD Risks: Diabetes Mellitus, Male sex, Obesity, Family history, Sedentary lifestyle, Stress  Has dilated eye exam at least once a year?: Yes  Sees dentist every 6 months?: Yes  Feet checked by healthcare provider in the last year?: No    Healthy Coping:  Healthy Coping Assessed Today: Yes  Emotional response to diabetes: Ready to learn, Concern for health and well-being  Informal Support system:: Spouse  Stage of change: MAINTENANCE (Working to maintain change, with risk of relapse)  Support resources: None  Patient Activation Measure Survey Score:  No flowsheet data found.    Diabetes knowledge and skills assessment:   Patient is knowledgeable in diabetes management concepts related to: Healthy Eating, Being Active, Monitoring, Taking Medication, Problem Solving, Reducing Risks and Healthy Coping    Patient needs further education on the following diabetes management  concepts:  No concerns.  Pt is doing well.     Based on learning assessment above, most appropriate setting for further diabetes education would be: Individual setting.      INTERVENTIONS:    Education provided today on:  AADE Self-Care Behaviors:  Reducing Risks: foot care, A1C - goals, relating to blood glucose levels, how often to check and blood pressure and goals    Opportunities for ongoing education and support in diabetes-self management were discussed.    Pt verbalized understanding of concepts discussed and recommendations provided today.       Education Materials Provided:  No new materials today.     ASSESSMENT:  A1c today was 7.0% which is down from 10.2% at last check in March.  Pt had steroids prior to check in March (oral and injected) and is convinced that caused increase.  He has started riding his E-bike for exercise and has lost 8#.       Goals Addressed as of 6/22/2022                    Today    3/21/22       Patient Stated       Healthy Eating (pt-stated)   On track  On track    Added 12/2/18 by Ria Morillo RD      My Goal: I will follow carbohydrate counting meal plan.     What I need to meet my goal: meal planning education, written meal plan.     I plan to meet my goal by this date: next visit.             Patient's most recent   Lab Results   Component Value Date    A1C 7.0 06/22/2022    is meeting goal of <7.5% for age.     PLAN  Continue to work on making healthy, low carbohydrate food choices.  Exercise goal is 150 minutes weekly.  Test glucose 1x/day - alternate times.   See Patient Instructions for co-developed, patient-stated behavior change goals.  AVS printed and provided to patient today.   Follow up in three months for glucose review/A1c check.     Time Spent: 40 minutes  Encounter Type: Individual    Any diabetes medication dose changes were made via the CDE Protocol and Collaborative Practice Agreement with the patient's referring provider. A copy of this encounter was  shared with the provider.          Sincerely,        Ria Morillo RD

## 2022-06-22 NOTE — PATIENT INSTRUCTIONS
-Continue to work on healthy, low carbohydrate meal plan.  -Exercise goal is 150 minutes weekly.   -Test glucose 1x/day.  Good times are fasting or 2 hours after a meal.  -Target levels are fasting , 2 hours after a meal < 180.  -A1c today was 7.0% which is down from 10.2% at last check in March.  Great job!  -Follow up in three months.  -Call with any concerns - PATRIZIA Short, [phone].

## 2022-08-17 ENCOUNTER — TRANSFERRED RECORDS (OUTPATIENT)
Dept: HEALTH INFORMATION MANAGEMENT | Facility: CLINIC | Age: 72
End: 2022-08-17

## 2022-08-17 LAB
RETINOPATHY: NEGATIVE
RETINOPATHY: NEGATIVE

## 2022-09-26 ENCOUNTER — HOSPITAL ENCOUNTER (OUTPATIENT)
Dept: EDUCATION SERVICES | Facility: HOSPITAL | Age: 72
Discharge: HOME | End: 2022-09-26
Attending: DIETITIAN, REGISTERED | Admitting: FAMILY MEDICINE
Payer: MEDICARE

## 2022-09-26 VITALS
HEIGHT: 70 IN | OXYGEN SATURATION: 98 % | HEART RATE: 83 BPM | SYSTOLIC BLOOD PRESSURE: 169 MMHG | WEIGHT: 248.9 LBS | RESPIRATION RATE: 16 BRPM | DIASTOLIC BLOOD PRESSURE: 94 MMHG | BODY MASS INDEX: 35.63 KG/M2

## 2022-09-26 DIAGNOSIS — E11.9 TYPE 2 DIABETES MELLITUS WITHOUT COMPLICATION, WITHOUT LONG-TERM CURRENT USE OF INSULIN (H): Primary | ICD-10-CM

## 2022-09-26 LAB — HBA1C MFR BLD: 6.9 % (ref 4.3–?)

## 2022-09-26 PROCEDURE — G0108 DIAB MANAGE TRN  PER INDIV: HCPCS | Performed by: DIETITIAN, REGISTERED

## 2022-09-26 PROCEDURE — 83036 HEMOGLOBIN GLYCOSYLATED A1C: CPT | Performed by: NURSE PRACTITIONER

## 2022-09-26 ASSESSMENT — PAIN SCALES - GENERAL: PAINLEVEL: NO PAIN (0)

## 2022-09-26 NOTE — PROGRESS NOTES
Diabetes Self-Management Education & Support    Presents for: Individual review    Type of Service: In Person Visit    ASSESSMENT:  A1c today acceptable at 6.9%.  Weight is stable.  BP elevated on two checks today.  Foot exam is due.      Patient's most recent   Lab Results   Component Value Date    A1C 7.0 06/22/2022    HEMOGLOBINA1 6.9 09/26/2022     is meeting goal of <7.5% for age.    Diabetes knowledge and skills assessment:   Patient is knowledgeable in diabetes management concepts related to: Healthy Eating, Being Active, Monitoring and Taking Medication    Education today:  BP goals, importance on continuing exercise in winter months, foot exam is due.     Based on learning assessment above, most appropriate setting for further diabetes education would be: Individual setting.      PLAN  Continue to work on healthy food choices.  Continue exercise in winter months.  Test glucose 1x/day.  Check BP at home and see provider or call him with results.   Foot exam due at next provider visit.     Follow-up: 6 months    See Care Plan for co-developed, patient-state behavior change goals.  AVS provided for patient today.    Education Materials Provided:  No new materials today.     SUBJECTIVE/OBJECTIVE:  Presents for: Individual review  Accompanied by: Self  Diabetes education in the past 24mo: Yes  Focus of Visit: Assistance w/ making life changes  Diabetes type: Type 2  Date of diagnosis: 2008  Disease course: Stable  How confident are you filling out medical forms by yourself:: Extremely  Diabetes management related comments/concerns: none  Transportation concerns: No  Difficulty affording diabetes medication?: No  Difficulty affording diabetes testing supplies?: No  Other concerns:: None  Cultural Influences/Ethnic Background:  Not  or     Diabetes Symptoms & Complications:  Fatigue: No  Neuropathy: No  Polydipsia: No  Polyphagia: No  Polyuria: No  Visual change: No  Slow healing wounds: No  Symptom  "course: Stable  Weight trend: Stable  Complications assessed today?: Yes  CVA: No  Heart disease: No  Nephropathy: No  Retinopathy: No    Patient Problem List and Family Medical History reviewed for relevant medical history, current medical status, and diabetes risk factors.    Vitals:  /94   Pulse 83   Resp 16   Ht 1.765 m (5' 9.5\")   Wt 112.9 kg (248 lb 14.4 oz)   SpO2 98%   BMI 36.23 kg/m    Estimated body mass index is 36.23 kg/m  as calculated from the following:    Height as of this encounter: 1.765 m (5' 9.5\").    Weight as of this encounter: 112.9 kg (248 lb 14.4 oz).   Last 3 BP:   BP Readings from Last 3 Encounters:   09/26/22 169/94   06/22/22 137/76   03/21/22 175/75       History   Smoking Status     Former Smoker     Years: 25.00     Types: Cigarettes     Quit date: 1991   Smokeless Tobacco     Never Used       Labs:  Lab Results   Component Value Date    A1C 7.0 06/22/2022    HEMOGLOBINA1 6.9 09/26/2022     Lab Results   Component Value Date     06/16/2021     Lab Results   Component Value Date    LDL 99 12/16/2019     HDL Cholesterol   Date Value Ref Range Status   12/16/2019 30 (L) >=60 mg/dL Final   ]  GFR Estimate   Date Value Ref Range Status   06/16/2021 90 >60 mL/min/[1.73_m2] Final     Comment:     Non  GFR Calc  Starting 12/18/2018, serum creatinine based estimated GFR (eGFR) will be   calculated using the Chronic Kidney Disease Epidemiology Collaboration   (CKD-EPI) equation.       GFR Estimate If Black   Date Value Ref Range Status   06/16/2021 >90 >60 mL/min/[1.73_m2] Final     Comment:      GFR Calc  Starting 12/18/2018, serum creatinine based estimated GFR (eGFR) will be   calculated using the Chronic Kidney Disease Epidemiology Collaboration   (CKD-EPI) equation.       Lab Results   Component Value Date    CR 0.81 06/16/2021     No results found for: MICROALBUMIN    Healthy Eating:  Healthy Eating Assessed Today: Yes  Cultural/Mosque " diet restrictions?: No  Meal planning/habits: Avoiding sweets  How many times a week on average do you eat food made away from home (restaurant/take-out)?: 1 (2 burgers - no fries)  Meals include: Breakfast, Lunch, Dinner  Breakfast: bagel with egg/cheese  Lunch: sandwich  Dinner: meat, starch, veggies or salad - last night had pasta dish with sausage and 1 corn on cob  Snacks: occasional sugar free cookies  Beverages: Water, Coffee, Diet soda, Other (Crystal Lite)  Has patient met with a dietitian in the past?: Yes    Being Active:  Being Active Assessed Today: Yes  Exercise:: Yes (Has E-Bike and a treadmill)  Days per week of moderate to strenuous exercise (like a brisk walk): 2  On average, minutes per day of exercise at this level: 60  How intense was your typical exercise? : Moderate (like brisk walking)  Exercise Minutes per Week: 120  Barrier to exercise: None    Monitoring:  Monitoring Assessed Today: Yes  Did patient bring glucose meter to appointment? : Yes  Blood Glucose Meter: One Touch  Times checking blood sugar at home (number): 1  Times checking blood sugar at home (per): Day  Blood glucose trend: No change  Fasting-131, 144, 155, 139, 126, 127, 148, 134, 145, 152, 121, 146, 148  Later in day-108, 131, 96  Two week average is 129.    Taking Medications:  Diabetes Medication(s)     Incretin Mimetic Agents (GLP-1 Receptor Agonists)       TRULICITY 1.5 MG/0.5ML pen    INJECT 0.5ML (=1.5 MG)     SUBCUTANEOUSLY EVERY 7 DAYS          Taking Medication Assessed Today: Yes  Current Treatments: Non-insulin Injectables (Trulicity 1.5 mg weekly)  Problems taking diabetes medications regularly?: No  Diabetes medication side effects?: No    Problem Solving:  Problem Solving Assessed Today: Yes  Is the patient at risk for hypoglycemia?: No  Is the patient at risk for DKA?: No    Reducing Risks:  Reducing Risks Assessed Today: Yes  Has dilated eye exam at least once a year?: Yes (Requested 9/26/22)  Feet checked by  healthcare provider in the last year?: No    Healthy Coping:  Healthy Coping Assessed Today: Yes  Emotional response to diabetes: Acceptance  Informal Support system:: Spouse  Stage of change: MAINTENANCE (Working to maintain change, with risk of relapse)  Patient Activation Measure Survey Score:  No flowsheet data found.      Care Plan and Education Provided:  Care Plan: Diabetes   Updates made by Ria Morillo RD since 9/26/2022 12:00 AM      Problem: HbA1C Not In Goal       Goal: Establish Regular Follow-Ups with PCP       Task: Discuss with PCP the recommended timing for patient's next follow up visit(s)    Responsible User: Ria Morillo RD      Task: Discuss schedule for PCP visits with patient    Responsible User: Ria Morillo RD      Goal: Get HbA1C Level in Goal       Task: Educate patient on diabetes education self-management topics    Responsible User: Ria Morillo RD      Task: Educate patient on benefits of regular glucose monitoring    Responsible User: Ria Morillo RD      Task: Refer patient to appropriate extended care team member, as needed (Medication Therapy Management, Behavioral Health, Physical Therapy, etc.)    Responsible User: Ria Morillo RD      Task: Discuss diabetes treatment plan with patient    Responsible User: Ria Morillo RD      Problem: Diabetes Self-Management Education Needed to Optimize Self-Care Behaviors       Goal: Understand diabetes pathophysiology and disease progression       Task: Provide education on diabetes pathophysiology and disease progression specfic to patient's diabetes type    Responsible User: Ria Morillo RD      Goal: Healthy Eating - follow a healthy eating pattern for diabetes       Task: Provide education on portion control and consistency in amount, composition and timing of food intake    Responsible User: Ria Morillo RD      Task: Provide education on managing carbohydrate intake (carbohydrate counting, plate planning method, etc.)     Responsible User: Ria Morillo RD      Task: Provide education on weight management    Responsible User: Ria Morillo RD      Task: Provide education on heart healthy eating    Responsible User: Ria Morillo RD      Task: Provide education on eating out    Responsible User: Ria Morillo RD      Task: Develop individualized healthy eating plan with patient    Responsible User: Ria Morillo RD      Goal: Being Active - get regular physical activity, working up to at least 150 minutes per week    Start Date: 9/26/2022   This Visit's Progress: 0%   Note:    Pt will exercise for 30 minutes at least 5x/week.      Task: Provide education on relationship of activity to glucose and precautions to take if at risk for low glucose    Responsible User: Ria Morillo RD      Task: Discuss barriers to physical activity with patient    Responsible User: Ria Morillo RD      Task: Develop physical activity plan with patient    Responsible User: Ria Morillo RD      Task: Explore community resources including walking groups, assistance programs, and home videos    Responsible User: Ria Morillo RD      Goal: Monitoring - monitor glucose and ketones as directed       Task: Provide education on blood glucose monitoring (purpose, proper technique, frequency, glucose targets, interpreting results, when to use glucose control solution, sharps disposal)    Responsible User: Ria Morillo RD      Task: Provide education on continuous glucose monitoring (sensor placement, use of aníbal or /reader, understanding glucose trends, alerts and alarms, differences between sensor glucose and blood glucose)    Responsible User: Ria Morillo RD      Task: Provide education on ketone monitoring (when to monitor, frequency, etc.)    Responsible User: Ria Morillo RD      Goal: Taking Medication - patient is consistently taking medications as directed       Task: Provide education on action of prescribed medication, including  when to take and possible side effects    Responsible User: Ria Morillo RD      Task: Provide education on insulin and injectable diabetes medications, including administration, storage, site selection and rotation for injection sites    Responsible User: Ria Morillo RD      Task: Discuss barriers to medication adherence with patient and provide management technique ideas as appropriate    Responsible User: Ria Morillo RD      Task: Provide education on frequency and refill details of medications    Responsible User: Ria Morillo RD      Goal: Problem Solving - know how to prevent and manage short-term diabetes complications       Task: Provide education on high blood glucose - causes, signs/symptoms, prevention and treatment    Responsible User: iRa Morillo RD      Task: Provide education on low blood glucose - causes, signs/symptoms, prevention, treatment, carrying a carbohydrate source at all times, and medical identification    Responsible User: Ria Morillo RD      Task: Provide education on safe travel with diabetes    Responsible User: Ria Morillo RD      Task: Provide education on how to care for diabetes on sick days    Responsible User: Ria Morillo RD      Task: Provide education on when to call a health care provider    Responsible User: Ria Morillo RD      Goal: Reducing Risks - know how to prevent and treat long-term diabetes complications       Task: Provide education on major complications of diabetes, prevention, early diagnostic measures and treatment of complications    Responsible User: Ria Morillo RD      Task: Provide education on recommended care for dental, eye and foot health Completed 9/26/2022   Responsible User: Ria Morillo RD      Task: Provide education on Hemoglobin A1c - goals and relationship to blood glucose levels Completed 9/26/2022   Responsible User: Ria Morillo RD      Task: Provide education on recommendations for heart health - lipid levels  and goals, blood pressure and goals, and aspirin therapy, if indicated Completed 9/26/2022   Responsible User: Ria Morillo RD      Task: Provide education on tobacco cessation    Responsible User: Ria Morillo RD      Goal: Healthy Coping - use available resources to cope with the challenges of managing diabetes       Task: Discuss recognizing feelings about having diabetes    Responsible User: Ria Morillo RD      Task: Provide education on the benefits of making appropriate lifestyle changes    Responsible User: Ria Morillo RD      Task: Provide education on benefits of utilizing support systems    Responsible User: Ria Morillo RD      Task: Discuss methods for coping with stress    Responsible User: Ria Morillo RD      Task: Provide education on when to seek professional counseling    Responsible User: Ria Morillo RD          Time Spent: 40 minutes  Encounter Type: Individual    Any diabetes medication dose changes were made via the CDE Protocol per the patient's referring provider. A copy of this encounter was shared with the provider.

## 2022-09-26 NOTE — LETTER
9/26/2022        RE: Donis Watt  [address] [facility] [address]  [place] [id number]        Diabetes Self-Management Education & Support    Presents for: Individual review    Type of Service: In Person Visit    ASSESSMENT:  A1c today acceptable at 6.9%.  Weight is stable.  BP elevated on two checks today.  Foot exam is due.      Patient's most recent   Lab Results   Component Value Date    A1C 7.0 06/22/2022    HEMOGLOBINA1 6.9 09/26/2022     is meeting goal of <7.5% for age.    Diabetes knowledge and skills assessment:   Patient is knowledgeable in diabetes management concepts related to: Healthy Eating, Being Active, Monitoring and Taking Medication    Education today:  BP goals, importance on continuing exercise in winter months, foot exam is due.     Based on learning assessment above, most appropriate setting for further diabetes education would be: Individual setting.      PLAN  Continue to work on healthy food choices.  Continue exercise in winter months.  Test glucose 1x/day.  Check BP at home and see provider or call him with results.   Foot exam due at next provider visit.     Follow-up: 6 months    See Care Plan for co-developed, patient-state behavior change goals.  AVS provided for patient today.    Education Materials Provided:  No new materials today.     SUBJECTIVE/OBJECTIVE:  Presents for: Individual review  Accompanied by: Self  Diabetes education in the past 24mo: Yes  Focus of Visit: Assistance w/ making life changes  Diabetes type: Type 2  Date of diagnosis: 2008  Disease course: Stable  How confident are you filling out medical forms by yourself:: Extremely  Diabetes management related comments/concerns: none  Transportation concerns: No  Difficulty affording diabetes medication?: No  Difficulty affording diabetes testing supplies?: No  Other concerns:: None  Cultural Influences/Ethnic Background:  Not  or     Diabetes Symptoms & Complications:  Fatigue: No  Neuropathy:  "No  Polydipsia: No  Polyphagia: No  Polyuria: No  Visual change: No  Slow healing wounds: No  Symptom course: Stable  Weight trend: Stable  Complications assessed today?: Yes  CVA: No  Heart disease: No  Nephropathy: No  Retinopathy: No    Patient Problem List and Family Medical History reviewed for relevant medical history, current medical status, and diabetes risk factors.    Vitals:  /94   Pulse 83   Resp 16   Ht 1.765 m (5' 9.5\")   Wt 112.9 kg (248 lb 14.4 oz)   SpO2 98%   BMI 36.23 kg/m    Estimated body mass index is 36.23 kg/m  as calculated from the following:    Height as of this encounter: 1.765 m (5' 9.5\").    Weight as of this encounter: 112.9 kg (248 lb 14.4 oz).   Last 3 BP:   BP Readings from Last 3 Encounters:   09/26/22 169/94   06/22/22 137/76   03/21/22 175/75       History   Smoking Status     Former Smoker     Years: 25.00     Types: Cigarettes     Quit date: 1991   Smokeless Tobacco     Never Used       Labs:  Lab Results   Component Value Date    A1C 7.0 06/22/2022    HEMOGLOBINA1 6.9 09/26/2022     Lab Results   Component Value Date     06/16/2021     Lab Results   Component Value Date    LDL 99 12/16/2019     HDL Cholesterol   Date Value Ref Range Status   12/16/2019 30 (L) >=60 mg/dL Final   ]  GFR Estimate   Date Value Ref Range Status   06/16/2021 90 >60 mL/min/[1.73_m2] Final     Comment:     Non  GFR Calc  Starting 12/18/2018, serum creatinine based estimated GFR (eGFR) will be   calculated using the Chronic Kidney Disease Epidemiology Collaboration   (CKD-EPI) equation.       GFR Estimate If Black   Date Value Ref Range Status   06/16/2021 >90 >60 mL/min/[1.73_m2] Final     Comment:      GFR Calc  Starting 12/18/2018, serum creatinine based estimated GFR (eGFR) will be   calculated using the Chronic Kidney Disease Epidemiology Collaboration   (CKD-EPI) equation.       Lab Results   Component Value Date    CR 0.81 06/16/2021     No " results found for: MICROALBUMIN    Healthy Eating:  Healthy Eating Assessed Today: Yes  Cultural/Mandaen diet restrictions?: No  Meal planning/habits: Avoiding sweets  How many times a week on average do you eat food made away from home (restaurant/take-out)?: 1 (2 burgers - no fries)  Meals include: Breakfast, Lunch, Dinner  Breakfast: bagel with egg/cheese  Lunch: sandwich  Dinner: meat, starch, veggies or salad - last night had pasta dish with sausage and 1 corn on cob  Snacks: occasional sugar free cookies  Beverages: Water, Coffee, Diet soda, Other (Crystal Lite)  Has patient met with a dietitian in the past?: Yes    Being Active:  Being Active Assessed Today: Yes  Exercise:: Yes (Has E-Bike and a treadmill)  Days per week of moderate to strenuous exercise (like a brisk walk): 2  On average, minutes per day of exercise at this level: 60  How intense was your typical exercise? : Moderate (like brisk walking)  Exercise Minutes per Week: 120  Barrier to exercise: None    Monitoring:  Monitoring Assessed Today: Yes  Did patient bring glucose meter to appointment? : Yes  Blood Glucose Meter: One Touch  Times checking blood sugar at home (number): 1  Times checking blood sugar at home (per): Day  Blood glucose trend: No change  Fasting-131, 144, 155, 139, 126, 127, 148, 134, 145, 152, 121, 146, 148  Later in day-108, 131, 96  Two week average is 129.    Taking Medications:  Diabetes Medication(s)     Incretin Mimetic Agents (GLP-1 Receptor Agonists)       TRULICITY 1.5 MG/0.5ML pen    INJECT 0.5ML (=1.5 MG)     SUBCUTANEOUSLY EVERY 7 DAYS          Taking Medication Assessed Today: Yes  Current Treatments: Non-insulin Injectables (Trulicity 1.5 mg weekly)  Problems taking diabetes medications regularly?: No  Diabetes medication side effects?: No    Problem Solving:  Problem Solving Assessed Today: Yes  Is the patient at risk for hypoglycemia?: No  Is the patient at risk for DKA?: No    Reducing Risks:  Reducing  Risks Assessed Today: Yes  Has dilated eye exam at least once a year?: Yes (Requested 9/26/22)  Feet checked by healthcare provider in the last year?: No    Healthy Coping:  Healthy Coping Assessed Today: Yes  Emotional response to diabetes: Acceptance  Informal Support system:: Spouse  Stage of change: MAINTENANCE (Working to maintain change, with risk of relapse)  Patient Activation Measure Survey Score:  No flowsheet data found.      Care Plan and Education Provided:  Care Plan: Diabetes   Updates made by Ria Morillo RD since 9/26/2022 12:00 AM      Problem: HbA1C Not In Goal       Goal: Establish Regular Follow-Ups with PCP       Task: Discuss with PCP the recommended timing for patient's next follow up visit(s)    Responsible User: Ria Morillo RD      Task: Discuss schedule for PCP visits with patient    Responsible User: Ria Morillo RD      Goal: Get HbA1C Level in Goal       Task: Educate patient on diabetes education self-management topics    Responsible User: Ria Morillo RD      Task: Educate patient on benefits of regular glucose monitoring    Responsible User: Ria Morillo RD      Task: Refer patient to appropriate extended care team member, as needed (Medication Therapy Management, Behavioral Health, Physical Therapy, etc.)    Responsible User: Ria Morillo RD      Task: Discuss diabetes treatment plan with patient    Responsible User: Ria Morillo RD      Problem: Diabetes Self-Management Education Needed to Optimize Self-Care Behaviors       Goal: Understand diabetes pathophysiology and disease progression       Task: Provide education on diabetes pathophysiology and disease progression specfic to patient's diabetes type    Responsible User: Ria Morillo RD      Goal: Healthy Eating - follow a healthy eating pattern for diabetes       Task: Provide education on portion control and consistency in amount, composition and timing of food intake    Responsible User: Ria Morillo RD       Task: Provide education on managing carbohydrate intake (carbohydrate counting, plate planning method, etc.)    Responsible User: Ria Morillo RD      Task: Provide education on weight management    Responsible User: Ria Morillo RD      Task: Provide education on heart healthy eating    Responsible User: Ria Morillo RD      Task: Provide education on eating out    Responsible User: Ria Morillo RD      Task: Develop individualized healthy eating plan with patient    Responsible User: Ria Morillo RD      Goal: Being Active - get regular physical activity, working up to at least 150 minutes per week    Start Date: 9/26/2022   This Visit's Progress: 0%   Note:    Pt will exercise for 30 minutes at least 5x/week.      Task: Provide education on relationship of activity to glucose and precautions to take if at risk for low glucose    Responsible User: Ria Morillo RD      Task: Discuss barriers to physical activity with patient    Responsible User: Ria Morillo RD      Task: Develop physical activity plan with patient    Responsible User: Ria Morillo RD      Task: Explore community resources including walking groups, assistance programs, and home videos    Responsible User: Ria Morillo RD      Goal: Monitoring - monitor glucose and ketones as directed       Task: Provide education on blood glucose monitoring (purpose, proper technique, frequency, glucose targets, interpreting results, when to use glucose control solution, sharps disposal)    Responsible User: Ria Morillo RD      Task: Provide education on continuous glucose monitoring (sensor placement, use of aníbal or /reader, understanding glucose trends, alerts and alarms, differences between sensor glucose and blood glucose)    Responsible User: Ria Morillo RD      Task: Provide education on ketone monitoring (when to monitor, frequency, etc.)    Responsible User: Ria Morillo RD      Goal: Taking Medication - patient is  consistently taking medications as directed       Task: Provide education on action of prescribed medication, including when to take and possible side effects    Responsible User: Ria Morillo RD      Task: Provide education on insulin and injectable diabetes medications, including administration, storage, site selection and rotation for injection sites    Responsible User: Ria Morillo RD      Task: Discuss barriers to medication adherence with patient and provide management technique ideas as appropriate    Responsible User: Ria Morillo RD      Task: Provide education on frequency and refill details of medications    Responsible User: Ria Morillo RD      Goal: Problem Solving - know how to prevent and manage short-term diabetes complications       Task: Provide education on high blood glucose - causes, signs/symptoms, prevention and treatment    Responsible User: Ria Morillo RD      Task: Provide education on low blood glucose - causes, signs/symptoms, prevention, treatment, carrying a carbohydrate source at all times, and medical identification    Responsible User: Ria Morillo RD      Task: Provide education on safe travel with diabetes    Responsible User: Ria Morillo RD      Task: Provide education on how to care for diabetes on sick days    Responsible User: Ria Morillo RD      Task: Provide education on when to call a health care provider    Responsible User: Ria Morillo RD      Goal: Reducing Risks - know how to prevent and treat long-term diabetes complications       Task: Provide education on major complications of diabetes, prevention, early diagnostic measures and treatment of complications    Responsible User: Ria Morillo RD      Task: Provide education on recommended care for dental, eye and foot health Completed 9/26/2022   Responsible User: Ria Morillo RD      Task: Provide education on Hemoglobin A1c - goals and relationship to blood glucose levels Completed 9/26/2022    Responsible User: Ria Morillo RD      Task: Provide education on recommendations for heart health - lipid levels and goals, blood pressure and goals, and aspirin therapy, if indicated Completed 9/26/2022   Responsible User: Ria Morillo RD      Task: Provide education on tobacco cessation    Responsible User: Ria Morillo RD      Goal: Healthy Coping - use available resources to cope with the challenges of managing diabetes       Task: Discuss recognizing feelings about having diabetes    Responsible User: Ria Morillo RD      Task: Provide education on the benefits of making appropriate lifestyle changes    Responsible User: Ria Morillo RD      Task: Provide education on benefits of utilizing support systems    Responsible User: Ria Morillo RD      Task: Discuss methods for coping with stress    Responsible User: Ria Morillo RD      Task: Provide education on when to seek professional counseling    Responsible User: Ria Morillo RD          Time Spent: 40 minutes  Encounter Type: Individual    Any diabetes medication dose changes were made via the CDE Protocol per the patient's referring provider. A copy of this encounter was shared with the provider.        Sincerely,        Ria Morillo RD

## 2022-09-26 NOTE — PATIENT INSTRUCTIONS
-Continue to follow healthy, low carbohydrate meal plan.  -Exercise goal is 30 minutes most days of the week.   -Test glucose 1x/day.  Good times are fasting or 2 hours after a meal.  -Target levels are fasting , 2 hours after a meal < 180.  -A1c today - 6.9%.  Great job!  Goal is < 7.5%, less than 7.0% is better.  -Foot exam is due when you see your provider next.   -Follow up in six months.   -Call with any concerns - PATRIZIA Short, [facility] [phone].

## 2023-02-28 ENCOUNTER — TRANSFERRED RECORDS (OUTPATIENT)
Dept: HEALTH INFORMATION MANAGEMENT | Facility: CLINIC | Age: 73
End: 2023-02-28

## 2023-02-28 LAB
ALBUMIN (EXTERNAL): 4.2 G/DL (ref 3.5–5)
ALBUMIN (URINE) MG/SPEC: 8 MG/L
ALBUMIN/CREATININE RATIO: 4 MG/GM (ref 0–30)
ALKALINE PHOSPHATASE (EXTERNAL): 97 U/L (ref 45–122)
ALT SERPL-CCNC: 42 U/L (ref 18–65)
ANION GAP SERPL CALC-SCNC: 8 MMOL/L (ref 5–15)
AST SERPL-CCNC: 27 U/L (ref 10–40)
BILIRUB SERPL-MCNC: 0.4 MG/DL (ref 0.2–1)
BUN SERPL-MCNC: 19 MG/DL (ref 8–23)
CALCIUM (EXTERNAL): 8.9 MG/DL (ref 8.5–10.5)
CHLORIDE (EXTERNAL): 103 MMOL/L (ref 98–107)
CHOLESTEROL (EXTERNAL): 164 MG/DL
CO2 (EXTERNAL): 30 MMOL/L (ref 23–32)
CREATININE (EXTERNAL): 0.84 MG/DL (ref 0.8–1.5)
CREATININE (URINE): 207 MG/DL
GFR ESTIMATED (EXTERNAL): >90 ML/MIN/1.73M2
GFR ESTIMATED (IF AFRICAN AMERICAN) (EXTERNAL): ABNORMAL ML/MIN/1.73M2
GLUCOSE (EXTERNAL): 200 MG/DL (ref 60–99)
HBA1C MFR BLD: 8.7 %
HDLC SERPL-MCNC: 32 MG/DL
LDL CHOLESTEROL CALCULATED (EXTERNAL): 101 MG/DL
POTASSIUM (EXTERNAL): 3.9 MMOL/L (ref 3.5–5.1)
PROTEIN TOTAL (EXTERNAL): 6.7 G/DL (ref 6–8)
SODIUM (EXTERNAL): 141 MMOL/L (ref 136–145)
TRIGLYCERIDES (EXTERNAL): 157 MG/DL

## 2023-03-07 ENCOUNTER — TRANSFERRED RECORDS (OUTPATIENT)
Dept: HEALTH INFORMATION MANAGEMENT | Facility: CLINIC | Age: 73
End: 2023-03-07

## 2023-03-27 ENCOUNTER — HOSPITAL ENCOUNTER (OUTPATIENT)
Dept: EDUCATION SERVICES | Facility: HOSPITAL | Age: 73
Discharge: HOME | End: 2023-03-27
Attending: DIETITIAN, REGISTERED | Admitting: FAMILY MEDICINE
Payer: COMMERCIAL

## 2023-03-27 VITALS
HEART RATE: 84 BPM | DIASTOLIC BLOOD PRESSURE: 75 MMHG | HEIGHT: 70 IN | BODY MASS INDEX: 36.82 KG/M2 | RESPIRATION RATE: 16 BRPM | SYSTOLIC BLOOD PRESSURE: 147 MMHG | WEIGHT: 257.2 LBS | OXYGEN SATURATION: 98 %

## 2023-03-27 DIAGNOSIS — E11.65 TYPE 2 DIABETES MELLITUS WITH HYPERGLYCEMIA, WITHOUT LONG-TERM CURRENT USE OF INSULIN (H): Primary | ICD-10-CM

## 2023-03-27 PROCEDURE — G0108 DIAB MANAGE TRN  PER INDIV: HCPCS | Performed by: DIETITIAN, REGISTERED

## 2023-03-27 RX ORDER — DULAGLUTIDE 3 MG/.5ML
3 INJECTION, SOLUTION SUBCUTANEOUS WEEKLY
COMMUNITY

## 2023-03-27 ASSESSMENT — PAIN SCALES - GENERAL: PAINLEVEL: NO PAIN (0)

## 2023-03-27 NOTE — LETTER
3/27/2023        RE: Donis Watt  [address] [facility] [address]  [place] [id number]        Diabetes Self-Management Education & Support    Presents for: Individual review    Type of Service: In Person Visit    ASSESSMENT:  Recent A1c was 8.7% which is up from previous A1c of 6.9% Sept 2022.  Pt saw provider at the beginning of the month and Trulicity was increased from 1.5 mg weekly to 3 mg weekly.  He has taken three doses thus far and is having some intermittent diarrhea.  He did not have this on 1.5 mg weekly dose.  Note patient was ill with respiratory type infection in February and glucose levels trended up to 220's fasting.  They are now 150-180's fasting.  He has gained 9# since last visit to [facility] Sept 2022.  He continues to make efforts to limit foods high in carbohydrates and walks on treadmill daily.  Will need to consider reducing Trulicity dose and adding additional medication if side effects do not resolve.      Patient's most recent   Lab Results   Component Value Date    A1C 8.7 02/28/2023    HEMOGLOBINA1 6.9 09/26/2022     Is not meeting goal of <7.5% for age.     Diabetes knowledge and skills assessment:   Patient is knowledgeable in diabetes management concepts related to: Healthy Eating, Being Active, Monitoring and Taking Medication    Education today focused on:   Taking Medications - Side effects.  Discussed possible use of SGLT-2 inhibitor.  Discussed increasing fiber intake to see if helps with intermittent diarrhea.      Based on learning assessment above, most appropriate setting for further diabetes education would be: Individual setting.      PLAN  Gradually increase fiber intake.  Continue glucose monitoring 1x/day - try some alternate time tests.  Take 3 mg Trulicity - one more dose this Friday.     Follow-up: Will speak with pt via phone on April 5th to see if will continue with Trulicity at 3 mg weekly or consider reduced dose and addition of SGLT-2.      See Care Plan for  "co-developed, patient-state behavior change goals.  AVS provided for patient today.    Education Materials Provided:  High fiber diet educational materials    SUBJECTIVE/OBJECTIVE:  Presents for: Individual review  Accompanied by: Self  Diabetes education in the past 24mo: Yes  Focus of Visit: Assistance w/ making life changes  Diabetes type: Type 2  Date of diagnosis: 2008  Disease course: Getting harder to manage  How confident are you filling out medical forms by yourself:: Extremely  Diabetes management related comments/concerns: Glucose levels trended up.  Trulicity increased to 3 mg weekly by provider and pt is having some issues with loose stools/diarrhea.  Transportation concerns: No  Difficulty affording diabetes medication?: No  Difficulty affording diabetes testing supplies?: No  Other concerns:: None  Cultural Influences/Ethnic Background:  Not  or     Diabetes Symptoms & Complications:  Fatigue: No  Neuropathy: No  Polydipsia: No  Polyphagia: No  Polyuria: No  Visual change: No  Slow healing wounds: No  Symptom course: Stable  Weight trend: Increasing (Weight is up 9# since last visit to [facility] in September.)  Complications assessed today?: Yes  CVA: No  Heart disease: No  Nephropathy: No  Retinopathy: No    Patient Problem List and Family Medical History reviewed for relevant medical history, current medical status, and diabetes risk factors.    Vitals:  /75   Pulse 84   Resp 16   Ht 1.765 m (5' 9.5\")   Wt 116.7 kg (257 lb 3.2 oz)   SpO2 98%   BMI 37.44 kg/m    Estimated body mass index is 37.44 kg/m  as calculated from the following:    Height as of this encounter: 1.765 m (5' 9.5\").    Weight as of this encounter: 116.7 kg (257 lb 3.2 oz).   Last 3 BP:   BP Readings from Last 3 Encounters:   03/27/23 147/75   09/26/22 169/94   06/22/22 137/76       History   Smoking Status     Former     Years: 25.00     Types: Cigarettes     Quit date: 1991   Smokeless Tobacco     Never "       Labs:  Lab Results   Component Value Date    A1C 7.0 06/22/2022    HEMOGLOBINA1 6.9 09/26/2022     Lab Results   Component Value Date     06/16/2021     Lab Results   Component Value Date    LDL 99 12/16/2019     HDL Cholesterol   Date Value Ref Range Status   12/16/2019 30 (L) >=60 mg/dL Final   ]  GFR Estimate   Date Value Ref Range Status   06/16/2021 90 >60 mL/min/[1.73_m2] Final     Comment:     Non  GFR Calc  Starting 12/18/2018, serum creatinine based estimated GFR (eGFR) will be   calculated using the Chronic Kidney Disease Epidemiology Collaboration   (CKD-EPI) equation.       GFR Estimate If Black   Date Value Ref Range Status   06/16/2021 >90 >60 mL/min/[1.73_m2] Final     Comment:      GFR Calc  Starting 12/18/2018, serum creatinine based estimated GFR (eGFR) will be   calculated using the Chronic Kidney Disease Epidemiology Collaboration   (CKD-EPI) equation.       Lab Results   Component Value Date    CR 0.81 06/16/2021     No results found for: MICROALBUMIN    Healthy Eating:  Healthy Eating Assessed Today: Yes  Cultural/Faith diet restrictions?: No  Meal planning/habits: Avoiding sweets  How many times a week on average do you eat food made away from home (restaurant/take-out)?: 1 (2 burgers - no fries)  Meals include: Breakfast, Lunch, Dinner  Breakfast: Rice Krispies with 1 tsp sugar, 4 oz milk  Lunch: sandwich with 4-5 oz milk  Dinner: meat, starch, veggies or salad - last night had salmon, small potato and salad  Snacks: occasional sugar free cookies (3-4 each)  Beverages: Water, Coffee, Diet soda, Other (Crystal Lite)  Has patient met with a dietitian in the past?: Yes    Being Active:  Being Active Assessed Today: Yes  Exercise:: Yes (Has E-Bike and a treadmill)  Days per week of moderate to strenuous exercise (like a brisk walk): 7  On average, minutes per day of exercise at this level: 20  How intense was your typical exercise? : Moderate (like  brisk walking)  Exercise Minutes per Week: 140  Barrier to exercise: None    Monitoring:  Monitoring Assessed Today: Yes  Did patient bring glucose meter to appointment? : Yes  Blood Glucose Meter: One Touch (Verio)  Times checking blood sugar at home (number): 1  Times checking blood sugar at home (per): Day  Blood glucose trend: Decreasing  Fasting-201, 189, 175, 181, 174, 166, 164, 194, 157, 165, 187, 190, 153, 180    Taking Medications:  Diabetes Medication(s)     Incretin Mimetic Agents       TRULICITY 1.5 MG/0.5ML pen    INJECT 0.5ML (=1.5 MG)     SUBCUTANEOUSLY EVERY 7 DAYS        Taking Medication Assessed Today: Yes  Current Treatments: Non-insulin Injectables (Trulicity 3 mg weekly (pt has taken 3 doses - takes on Friday))  Problems taking diabetes medications regularly?: No  Diabetes medication side effects?: Yes (Did not tolerate Metformin r/t gi side effects.  Pt has been having some intermittent diarrhea since increasing Trulicity to 3 mg dose.)    Problem Solving:  Problem Solving Assessed Today: Yes  Is the patient at risk for hypoglycemia?: No  Is the patient at risk for DKA?: No    Reducing Risks:  Reducing Risks Assessed Today: Yes  Diabetes Risks: Age over 45 years  CAD Risks: Diabetes Mellitus  Has dilated eye exam at least once a year?: Yes  Feet checked by healthcare provider in the last year?: Yes    Healthy Coping:  Healthy Coping Assessed Today: Yes  Emotional response to diabetes: Acceptance  Informal Support system:: Spouse  Stage of change: MAINTENANCE (Working to maintain change, with risk of relapse)  Patient Activation Measure Survey Score:  No flowsheet data found.    Care Plan and Education Provided:  Care Plan: Diabetes   Updates made by Ria Morillo RD since 3/27/2023 12:00 AM      Problem: HbA1C Not In Goal       Goal: Establish Regular Follow-Ups with PCP       Task: Discuss with PCP the recommended timing for patient's next follow up visit(s)    Responsible User: Ilia  TIEN Rollins      Task: Discuss schedule for PCP visits with patient    Responsible User: Ria Morillo RD      Goal: Get HbA1C Level in Goal       Task: Educate patient on diabetes education self-management topics    Responsible User: Ria Morillo RD      Task: Educate patient on benefits of regular glucose monitoring    Responsible User: Ria Morillo RD      Task: Refer patient to appropriate extended care team member, as needed (Medication Therapy Management, Behavioral Health, Physical Therapy, etc.)    Responsible User: Ria Morillo RD      Task: Discuss diabetes treatment plan with patient    Responsible User: Ria oMrillo RD      Problem: Diabetes Self-Management Education Needed to Optimize Self-Care Behaviors       Goal: Understand diabetes pathophysiology and disease progression       Task: Provide education on diabetes pathophysiology and disease progression specfic to patient's diabetes type    Responsible User: Ria Morillo RD      Goal: Healthy Eating - follow a healthy eating pattern for diabetes       Task: Provide education on portion control and consistency in amount, composition and timing of food intake    Responsible User: Ria Morillo RD      Task: Provide education on managing carbohydrate intake (carbohydrate counting, plate planning method, etc.)    Responsible User: Ria Morillo RD      Task: Provide education on weight management    Responsible User: Ria Morillo RD      Task: Provide education on heart healthy eating    Responsible User: Ria Morillo RD      Task: Provide education on eating out    Responsible User: Ria Morillo RD      Task: Develop individualized healthy eating plan with patient    Responsible User: Ria Morillo RD      Goal: Being Active - get regular physical activity, working up to at least 150 minutes per week       Task: Provide education on relationship of activity to glucose and precautions to take if at risk for low glucose Completed  3/27/2023   Responsible User: Ria Morillo RD      Task: Discuss barriers to physical activity with patient    Responsible User: Ria Morillo RD      Task: Develop physical activity plan with patient    Responsible User: Ria Morillo RD      Task: Explore community resources including walking groups, assistance programs, and home videos    Responsible User: Ria Morillo RD      Goal: Monitoring - monitor glucose and ketones as directed       Task: Provide education on blood glucose monitoring (purpose, proper technique, frequency, glucose targets, interpreting results, when to use glucose control solution, sharps disposal) Completed 3/27/2023   Responsible User: Ria Morillo RD   Note:    Reviewed testing times and target glucose levels.      Task: Provide education on continuous glucose monitoring (sensor placement, use of aníbal or /reader, understanding glucose trends, alerts and alarms, differences between sensor glucose and blood glucose)    Responsible User: Ria Morillo RD      Task: Provide education on ketone monitoring (when to monitor, frequency, etc.)    Responsible User: Ria Morillo RD      Goal: Taking Medication - patient is consistently taking medications as directed       Task: Provide education on action of prescribed medication, including when to take and possible side effects Completed 3/27/2023   Responsible User: Ria Morillo RD      Task: Provide education on insulin and injectable diabetes medications, including administration, storage, site selection and rotation for injection sites    Responsible User: Ria Morillo RD      Task: Discuss barriers to medication adherence with patient and provide management technique ideas as appropriate    Responsible User: Ria Morillo RD      Task: Provide education on frequency and refill details of medications    Responsible User: Ria Morillo RD      Goal: Problem Solving - know how to prevent and manage short-term diabetes  complications       Task: Provide education on high blood glucose - causes, signs/symptoms, prevention and treatment    Responsible User: Ria Morillo RD      Task: Provide education on low blood glucose - causes, signs/symptoms, prevention, treatment, carrying a carbohydrate source at all times, and medical identification    Responsible User: Ria Morillo RD      Task: Provide education on safe travel with diabetes    Responsible User: Ria Morillo RD      Task: Provide education on how to care for diabetes on sick days    Responsible User: Ria Morillo RD      Task: Provide education on when to call a health care provider    Responsible User: Ria Morillo RD      Goal: Reducing Risks - know how to prevent and treat long-term diabetes complications       Task: Provide education on major complications of diabetes, prevention, early diagnostic measures and treatment of complications    Responsible User: Ria Morillo RD      Task: Provide education on recommended care for dental, eye and foot health    Responsible User: Ria Morillo RD      Task: Provide education on Hemoglobin A1c - goals and relationship to blood glucose levels Completed 3/27/2023   Responsible User: Ria Morillo RD      Task: Provide education on recommendations for heart health - lipid levels and goals, blood pressure and goals, and aspirin therapy, if indicated    Responsible User: Ria Morillo RD      Task: Provide education on tobacco cessation    Responsible User: Ria Morillo RD      Goal: Healthy Coping - use available resources to cope with the challenges of managing diabetes       Task: Discuss recognizing feelings about having diabetes    Responsible User: Ria Morillo RD      Task: Provide education on the benefits of making appropriate lifestyle changes    Responsible User: Ria Morillo RD      Task: Provide education on benefits of utilizing support systems    Responsible User: Ria Morillo RD      Task:  Discuss methods for coping with stress    Responsible User: Ria Morillo RD      Task: Provide education on when to seek professional counseling    Responsible User: Ria Morillo RD        Time Spent: 45 minutes  Encounter Type: Individual    Any diabetes medication dose changes were made via the CDE Protocol per the patient's referring provider. A copy of this encounter was shared with the provider.        Sincerely,        Ria Morillo RD

## 2023-03-27 NOTE — PROGRESS NOTES
Diabetes Self-Management Education & Support    Presents for: Individual review    Type of Service: In Person Visit    ASSESSMENT:  Recent A1c was 8.7% which is up from previous A1c of 6.9% Sept 2022.  Pt saw provider at the beginning of the month and Trulicity was increased from 1.5 mg weekly to 3 mg weekly.  He has taken three doses thus far and is having some intermittent diarrhea.  He did not have this on 1.5 mg weekly dose.  Note patient was ill with respiratory type infection in February and glucose levels trended up to 220's fasting.  They are now 150-180's fasting.  He has gained 9# since last visit to [facility] Sept 2022.  He continues to make efforts to limit foods high in carbohydrates and walks on treadmill daily.  Will need to consider reducing Trulicity dose and adding additional medication if side effects do not resolve.      Patient's most recent   Lab Results   Component Value Date    A1C 8.7 02/28/2023    HEMOGLOBINA1 6.9 09/26/2022     Is not meeting goal of <7.5% for age.     Diabetes knowledge and skills assessment:   Patient is knowledgeable in diabetes management concepts related to: Healthy Eating, Being Active, Monitoring and Taking Medication    Education today focused on:   Taking Medications - Side effects.  Discussed possible use of SGLT-2 inhibitor.  Discussed increasing fiber intake to see if helps with intermittent diarrhea.      Based on learning assessment above, most appropriate setting for further diabetes education would be: Individual setting.      PLAN  Gradually increase fiber intake.  Continue glucose monitoring 1x/day - try some alternate time tests.  Take 3 mg Trulicity - one more dose this Friday.     Follow-up: Will speak with pt via phone on April 5th to see if will continue with Trulicity at 3 mg weekly or consider reduced dose and addition of SGLT-2.      See Care Plan for co-developed, patient-state behavior change goals.  AVS provided for patient today.    Education  "Materials Provided:  High fiber diet educational materials    SUBJECTIVE/OBJECTIVE:  Presents for: Individual review  Accompanied by: Self  Diabetes education in the past 24mo: Yes  Focus of Visit: Assistance w/ making life changes  Diabetes type: Type 2  Date of diagnosis: 2008  Disease course: Getting harder to manage  How confident are you filling out medical forms by yourself:: Extremely  Diabetes management related comments/concerns: Glucose levels trended up.  Trulicity increased to 3 mg weekly by provider and pt is having some issues with loose stools/diarrhea.  Transportation concerns: No  Difficulty affording diabetes medication?: No  Difficulty affording diabetes testing supplies?: No  Other concerns:: None  Cultural Influences/Ethnic Background:  Not  or     Diabetes Symptoms & Complications:  Fatigue: No  Neuropathy: No  Polydipsia: No  Polyphagia: No  Polyuria: No  Visual change: No  Slow healing wounds: No  Symptom course: Stable  Weight trend: Increasing (Weight is up 9# since last visit to [facility] in September.)  Complications assessed today?: Yes  CVA: No  Heart disease: No  Nephropathy: No  Retinopathy: No    Patient Problem List and Family Medical History reviewed for relevant medical history, current medical status, and diabetes risk factors.    Vitals:  /75   Pulse 84   Resp 16   Ht 1.765 m (5' 9.5\")   Wt 116.7 kg (257 lb 3.2 oz)   SpO2 98%   BMI 37.44 kg/m    Estimated body mass index is 37.44 kg/m  as calculated from the following:    Height as of this encounter: 1.765 m (5' 9.5\").    Weight as of this encounter: 116.7 kg (257 lb 3.2 oz).   Last 3 BP:   BP Readings from Last 3 Encounters:   03/27/23 147/75   09/26/22 169/94   06/22/22 137/76       History   Smoking Status     Former     Years: 25.00     Types: Cigarettes     Quit date: 1991   Smokeless Tobacco     Never       Labs:  Lab Results   Component Value Date    A1C 7.0 06/22/2022    HEMOGLOBINA1 6.9 09/26/2022 "     Lab Results   Component Value Date     06/16/2021     Lab Results   Component Value Date    LDL 99 12/16/2019     HDL Cholesterol   Date Value Ref Range Status   12/16/2019 30 (L) >=60 mg/dL Final   ]  GFR Estimate   Date Value Ref Range Status   06/16/2021 90 >60 mL/min/[1.73_m2] Final     Comment:     Non  GFR Calc  Starting 12/18/2018, serum creatinine based estimated GFR (eGFR) will be   calculated using the Chronic Kidney Disease Epidemiology Collaboration   (CKD-EPI) equation.       GFR Estimate If Black   Date Value Ref Range Status   06/16/2021 >90 >60 mL/min/[1.73_m2] Final     Comment:      GFR Calc  Starting 12/18/2018, serum creatinine based estimated GFR (eGFR) will be   calculated using the Chronic Kidney Disease Epidemiology Collaboration   (CKD-EPI) equation.       Lab Results   Component Value Date    CR 0.81 06/16/2021     No results found for: MICROALBUMIN    Healthy Eating:  Healthy Eating Assessed Today: Yes  Cultural/Adventism diet restrictions?: No  Meal planning/habits: Avoiding sweets  How many times a week on average do you eat food made away from home (restaurant/take-out)?: 1 (2 burgers - no fries)  Meals include: Breakfast, Lunch, Dinner  Breakfast: Rice Krispies with 1 tsp sugar, 4 oz milk  Lunch: sandwich with 4-5 oz milk  Dinner: meat, starch, veggies or salad - last night had salmon, small potato and salad  Snacks: occasional sugar free cookies (3-4 each)  Beverages: Water, Coffee, Diet soda, Other (Crystal Lite)  Has patient met with a dietitian in the past?: Yes    Being Active:  Being Active Assessed Today: Yes  Exercise:: Yes (Has E-Bike and a treadmill)  Days per week of moderate to strenuous exercise (like a brisk walk): 7  On average, minutes per day of exercise at this level: 20  How intense was your typical exercise? : Moderate (like brisk walking)  Exercise Minutes per Week: 140  Barrier to exercise: None    Monitoring:  Monitoring  Assessed Today: Yes  Did patient bring glucose meter to appointment? : Yes  Blood Glucose Meter: One Touch (Verio)  Times checking blood sugar at home (number): 1  Times checking blood sugar at home (per): Day  Blood glucose trend: Decreasing  Fasting-201, 189, 175, 181, 174, 166, 164, 194, 157, 165, 187, 190, 153, 180    Taking Medications:  Diabetes Medication(s)     Incretin Mimetic Agents       TRULICITY 1.5 MG/0.5ML pen    INJECT 0.5ML (=1.5 MG)     SUBCUTANEOUSLY EVERY 7 DAYS        Taking Medication Assessed Today: Yes  Current Treatments: Non-insulin Injectables (Trulicity 3 mg weekly (pt has taken 3 doses - takes on Friday))  Problems taking diabetes medications regularly?: No  Diabetes medication side effects?: Yes (Did not tolerate Metformin r/t gi side effects.  Pt has been having some intermittent diarrhea since increasing Trulicity to 3 mg dose.)    Problem Solving:  Problem Solving Assessed Today: Yes  Is the patient at risk for hypoglycemia?: No  Is the patient at risk for DKA?: No    Reducing Risks:  Reducing Risks Assessed Today: Yes  Diabetes Risks: Age over 45 years  CAD Risks: Diabetes Mellitus  Has dilated eye exam at least once a year?: Yes  Feet checked by healthcare provider in the last year?: Yes    Healthy Coping:  Healthy Coping Assessed Today: Yes  Emotional response to diabetes: Acceptance  Informal Support system:: Spouse  Stage of change: MAINTENANCE (Working to maintain change, with risk of relapse)  Patient Activation Measure Survey Score:  No flowsheet data found.    Care Plan and Education Provided:  Care Plan: Diabetes   Updates made by Ria Morillo RD since 3/27/2023 12:00 AM      Problem: HbA1C Not In Goal       Goal: Establish Regular Follow-Ups with PCP       Task: Discuss with PCP the recommended timing for patient's next follow up visit(s)    Responsible User: Ria Morillo RD      Task: Discuss schedule for PCP visits with patient    Responsible User: Ria Morillo RD       Goal: Get HbA1C Level in Goal       Task: Educate patient on diabetes education self-management topics    Responsible User: Ria Morillo RD      Task: Educate patient on benefits of regular glucose monitoring    Responsible User: Ria Morillo RD      Task: Refer patient to appropriate extended care team member, as needed (Medication Therapy Management, Behavioral Health, Physical Therapy, etc.)    Responsible User: Ria Morillo RD      Task: Discuss diabetes treatment plan with patient    Responsible User: Ria Morillo RD      Problem: Diabetes Self-Management Education Needed to Optimize Self-Care Behaviors       Goal: Understand diabetes pathophysiology and disease progression       Task: Provide education on diabetes pathophysiology and disease progression specfic to patient's diabetes type    Responsible User: Ria Morillo RD      Goal: Healthy Eating - follow a healthy eating pattern for diabetes       Task: Provide education on portion control and consistency in amount, composition and timing of food intake    Responsible User: Ria Morillo RD      Task: Provide education on managing carbohydrate intake (carbohydrate counting, plate planning method, etc.)    Responsible User: Ria Morillo RD      Task: Provide education on weight management    Responsible User: Ria Morillo RD      Task: Provide education on heart healthy eating    Responsible User: Ria Morillo RD      Task: Provide education on eating out    Responsible User: Ria Morillo RD      Task: Develop individualized healthy eating plan with patient    Responsible User: Ria Morillo RD      Goal: Being Active - get regular physical activity, working up to at least 150 minutes per week       Task: Provide education on relationship of activity to glucose and precautions to take if at risk for low glucose Completed 3/27/2023   Responsible User: Ria Morillo RD      Task: Discuss barriers to physical activity with patient     Responsible User: Ria Morillo RD      Task: Develop physical activity plan with patient    Responsible User: Ria Morillo RD      Task: Explore community resources including walking groups, assistance programs, and home videos    Responsible User: Ria Morillo RD      Goal: Monitoring - monitor glucose and ketones as directed       Task: Provide education on blood glucose monitoring (purpose, proper technique, frequency, glucose targets, interpreting results, when to use glucose control solution, sharps disposal) Completed 3/27/2023   Responsible User: Ria Morillo RD   Note:    Reviewed testing times and target glucose levels.      Task: Provide education on continuous glucose monitoring (sensor placement, use of aníbal or /reader, understanding glucose trends, alerts and alarms, differences between sensor glucose and blood glucose)    Responsible User: Ria Morillo RD      Task: Provide education on ketone monitoring (when to monitor, frequency, etc.)    Responsible User: Ria Morillo RD      Goal: Taking Medication - patient is consistently taking medications as directed       Task: Provide education on action of prescribed medication, including when to take and possible side effects Completed 3/27/2023   Responsible User: Ria Morillo RD      Task: Provide education on insulin and injectable diabetes medications, including administration, storage, site selection and rotation for injection sites    Responsible User: Ria Morillo RD      Task: Discuss barriers to medication adherence with patient and provide management technique ideas as appropriate    Responsible User: Ria Morillo RD      Task: Provide education on frequency and refill details of medications    Responsible User: Ria Morillo RD      Goal: Problem Solving - know how to prevent and manage short-term diabetes complications       Task: Provide education on high blood glucose - causes, signs/symptoms, prevention and  treatment    Responsible User: Ria Morillo RD      Task: Provide education on low blood glucose - causes, signs/symptoms, prevention, treatment, carrying a carbohydrate source at all times, and medical identification    Responsible User: Ria Morillo RD      Task: Provide education on safe travel with diabetes    Responsible User: Ria Morillo RD      Task: Provide education on how to care for diabetes on sick days    Responsible User: Ria Morillo RD      Task: Provide education on when to call a health care provider    Responsible User: Ria Morillo RD      Goal: Reducing Risks - know how to prevent and treat long-term diabetes complications       Task: Provide education on major complications of diabetes, prevention, early diagnostic measures and treatment of complications    Responsible User: Ria Morillo RD      Task: Provide education on recommended care for dental, eye and foot health    Responsible User: Ria Morillo RD      Task: Provide education on Hemoglobin A1c - goals and relationship to blood glucose levels Completed 3/27/2023   Responsible User: Ria Morillo RD      Task: Provide education on recommendations for heart health - lipid levels and goals, blood pressure and goals, and aspirin therapy, if indicated    Responsible User: Ria Morillo RD      Task: Provide education on tobacco cessation    Responsible User: Ria Morillo RD      Goal: Healthy Coping - use available resources to cope with the challenges of managing diabetes       Task: Discuss recognizing feelings about having diabetes    Responsible User: Ria Morillo RD      Task: Provide education on the benefits of making appropriate lifestyle changes    Responsible User: iRa Morillo RD      Task: Provide education on benefits of utilizing support systems    Responsible User: Ria Morillo RD      Task: Discuss methods for coping with stress    Responsible User: Ria Morillo RD      Task: Provide education on  when to seek professional counseling    Responsible User: Ria Morillo RD        Time Spent: 45 minutes  Encounter Type: Individual    Any diabetes medication dose changes were made via the CDE Protocol per the patient's referring provider. A copy of this encounter was shared with the provider.

## 2023-03-27 NOTE — PATIENT INSTRUCTIONS
-Keep limiting foods high in carbohydrates.   -Continue walking on your treadmill.   Exercise goal is 30 minutes most days of the week.  -Target glucose levels are fasting and before meals  , 2 hours after a meal < 180.    -Keep taking the Trulicity 3 mg weekly for now.    -I will call you April 5th to see how it's going and we can decide if we want to continue with Trulicity 3 mg or add another medication.  Consider - Jardiance or Farxiga.   -Call with concerns - PATRIZIA Short, [facility] [phone].

## 2023-03-28 DIAGNOSIS — E11.9 TYPE 2 DIABETES MELLITUS WITHOUT COMPLICATION, WITHOUT LONG-TERM CURRENT USE OF INSULIN (H): Primary | ICD-10-CM

## 2023-03-30 DIAGNOSIS — E11.9 TYPE 2 DIABETES MELLITUS WITHOUT COMPLICATION, WITHOUT LONG-TERM CURRENT USE OF INSULIN (H): Primary | ICD-10-CM

## 2023-04-03 ENCOUNTER — OFFICE VISIT (OUTPATIENT)
Dept: PODIATRY | Facility: OTHER | Age: 73
End: 2023-04-03
Attending: PODIATRIST
Payer: COMMERCIAL

## 2023-04-03 VITALS
DIASTOLIC BLOOD PRESSURE: 89 MMHG | HEART RATE: 89 BPM | OXYGEN SATURATION: 96 % | SYSTOLIC BLOOD PRESSURE: 144 MMHG | TEMPERATURE: 97.7 F

## 2023-04-03 DIAGNOSIS — L60.3 ONYCHODYSTROPHY: Primary | ICD-10-CM

## 2023-04-03 DIAGNOSIS — B35.3 TINEA PEDIS OF BOTH FEET: ICD-10-CM

## 2023-04-03 DIAGNOSIS — E11.9 DIABETES MELLITUS TYPE 2, INSULIN DEPENDENT (H): ICD-10-CM

## 2023-04-03 DIAGNOSIS — Z79.4 DIABETES MELLITUS TYPE 2, INSULIN DEPENDENT (H): ICD-10-CM

## 2023-04-03 PROCEDURE — 99203 OFFICE O/P NEW LOW 30 MIN: CPT | Mod: 25 | Performed by: PODIATRIST

## 2023-04-03 PROCEDURE — G0463 HOSPITAL OUTPT CLINIC VISIT: HCPCS | Mod: 25

## 2023-04-03 PROCEDURE — 11721 DEBRIDE NAIL 6 OR MORE: CPT | Mod: GZ | Performed by: PODIATRIST

## 2023-04-03 ASSESSMENT — PAIN SCALES - GENERAL: PAINLEVEL: NO PAIN (0)

## 2023-04-03 NOTE — PROGRESS NOTES
Chief complaint: Patient presents with:  Toenail: DFE      History of Present Illness: This 73 year old NIDDM II male is seen at the request of No ref. provider found for evaluation and suggestions of management of elongated, thickened toenails. Patient says he cannot bend to reach the toes and he made his skin bleed the last time he trimmed the toenails. He says he has fungi in the toenails and he cannot treat it.    He denies burning, tingling, and numbness in his feet.     He says he has dry, flaking skin on his feet. He puts some lotion on it, but it doesn't help to treat it. He is not sure how to treat it.    No further pedal complaints today.       Last HbA1C was 6.9% on 09/26/2022.  Last self-reported A1C was 8.2% in March, 2023.    Lab Results   Component Value Date    A1C 7.0 06/22/2022    A1C 10.2 03/21/2022    A1C 6.5 09/20/2021    A1C 10.2 06/16/2021    A1C 6.9 01/29/2020           BP (!) 144/89 (BP Location: Left arm, Patient Position: Sitting, Cuff Size: Adult Regular)   Pulse 89   Temp 97.7  F (36.5  C) (Tympanic)   SpO2 96%     Patient Active Problem List   Diagnosis     Type 2 diabetes mellitus without complication, without long-term current use of insulin (H)     Mixed hyperlipidemia     Obesity     Nocturia     Vitamin D deficiency     Essential hypertension     Dry eye syndrome of both eyes     Diarrhea     Benign prostatic hyperplasia without lower urinary tract symptoms     Age-related nuclear cataract of both eyes     Tear film insufficiency, bilateral     Anatomical narrow angle of both eyes     SREE (obstructive sleep apnea)     Insomnia       Past Surgical History:   Procedure Laterality Date     APPENDECTOMY      1980's     CARPAL TUNNEL RELEASE RT/LT Left      COLONOSCOPY  12/2014    colonoscopy with Dr Horn, repeat in 5 years     ENT SURGERY      nasal polyps     EYE SURGERY      laser iridotomy     MOUTH SURGERY  07/21/2014    oral surgery     ORTHOPEDIC SURGERY Right 1995    rotator  cuff repair     ORTHOPEDIC SURGERY Left 2002    left shoulder rotator cuff repair x's 2     ORTHOPEDIC SURGERY Left     ulnar nerve surgery       Current Outpatient Medications   Medication     ascorbic acid 1000 MG TABS tablet     aspirin 81 MG tablet     blood glucose (ACCU-CHEK GUIDE) test strip     blood glucose monitoring (SOFTCLIX) lancets     Carboxymethylcellulose Sodium (REFRESH TEARS OP)     Dulaglutide (TRULICITY) 3 MG/0.5ML SOPN     ENALAPRIL-HYDROCHLOROTHIAZIDE PO     finasteride (PROSCAR) 5 MG tablet     mirtazapine (REMERON) 15 MG tablet     Multiple Vitamin (MULTIVITAMIN ADULT PO)     PANTOPRAZOLE SODIUM PO     Probiotic Product (PROBIOTIC-10) CHEW     rosuvastatin (CRESTOR) 10 MG tablet     tamsulosin (FLOMAX) 0.4 MG capsule     VITAMIN D, CHOLECALCIFEROL, PO     No current facility-administered medications for this visit.          Allergies   Allergen Reactions     Flexeril [Cyclobenzaprine]      Codeine Anxiety     hyperactivity       No Clinical Screening - See Comments Anxiety     All Narcotics give him hyperactivity.        Family History   Problem Relation Age of Onset     Hypertension Mother      Cancer Mother      Cerebrovascular Disease Mother      CABG Father      Hypertension Sister      Cerebrovascular Disease Sister      Cerebrovascular Disease Sister        Social History     Socioeconomic History     Marital status:      Spouse name: None     Number of children: None     Years of education: None     Highest education level: None   Tobacco Use     Smoking status: Former     Years: 25.00     Types: Cigarettes     Quit date:      Years since quittin.2     Smokeless tobacco: Never   Substance and Sexual Activity     Alcohol use: Yes     Comment: social     Drug use: No       ROS: 10 point ROS neg other than the symptoms noted above in the HPI.  EXAM  Constitutional: healthy, alert and no distress    Psychiatric: mentation appears normal and affect  normal/bright    VASCULAR:  -Dorsalis pedis pulse +2/4 b/l  -Posterior tibial pulse +1/4 b/l  -Capillary refill time < 3 seconds to b/l hallux  -Hair growth Absent to b/l anterior legs and ankles  NEURO:  -Light touch sensation intact to b/l plantar forefoot  DERM:  -Skin temperature within normal limits bilaterally   -Skin mild-to-moderately erythematous with flaking of skin to bilateral plantar feet in a moccasin distribution  -Toenails elongated, thickened, dystrophic and discolored x 10  MSK:    -Muscle strength of ankles +5/5 for dorsiflexion, plantarflexion, ABDUction and ADDuction b/l    -Ankle joint passive ROM within normal limits except for dorsiflexion:    Dorsiflexion, RIGHT Straight knee 0 degrees    Dorsiflexion, LEFT Straight knee 0 degrees    ============================================================    ASSESSMENT:  (L60.3) Onychodystrophy  (primary encounter diagnosis)    (B35.3) Tinea pedis of both feet    (E11.9,  Z79.4) Diabetes mellitus type 2, insulin dependent (H)      PLAN:  -Patient evaluated and examined. Treatment options discussed with no educational barriers noted.    -High risk toenail debridement x 10 toenails without incident    -Diabetic Foot Education provided. This included checking the feet daily looking for new new blisters or wounds, wearing shoes at all times when walking including around the house, and avoiding lotion application between the toes. If there are any signs of infection, the patient should present to the ED as soon as possible. Infections of the foot can be life threatening or lead to amputations of the foot or leg.    -Discussed Athlete's Foot and treatment regimens.  Athlete's foot is a common condition in the foot. Fungi thrive in dark, warm, moist environments such as in shoe gear. Symptoms can include flaking skin, a red rash and/or itching, burning or flaking of skin. Moisture and/or raw skin can also develop between the toes. The following treatment is  adv ised?  ---Apply an anti-fungal foot cream to the feet every morning and evening  ---Apply an anti-fungal foot powder to the feet  Between the toes  ---Apply white, cotton socks to the feet daily  ---Switch out socks with a fresh powder senior care through the day if socks are moist  ---Spray an anti-fungal spray or Lysol spray to shoes daily   ---Spray tub/shower with a bleach based shower  daily after use    Diabetes Mellitus: Patient's DM is managed by their PCP. The DM appears to be stable although the blood sugars have recently increased. Patient's last HbA1C was 6.9% on 09/26/2022. His last self-reported HbA1C was 8.2% in March, 2023.    -DM shoes: Patient declined on 04/03/2023  ---Discussed DM shoes and advantages to DM shoes. DM shoes have a thicker sole which helps protect the feet from puncture wounds of sharp objects that can puncture through shoes. They also have more depth to the toe box and a wider toe box in attempt to prevent the shoes from rubbing on the toes and causing blisters/wounds. Additionally, the shoes come with a custom molded insert that is designed to also attempt to prevent blisters or ulcers of the foot. Blisters and ulcers can still occur while wearing DM shoes (espeically when the shoes are new), but they are aimed at helping prevent blisters and ulcers.    -Patient in agreement with the above treatment plan and all of patient's questions were answered.      Return to clinic 3 months for diabetic foot exam and high risk nail debridement         Renée Evans DPM

## 2023-04-04 ENCOUNTER — TRANSFERRED RECORDS (OUTPATIENT)
Dept: OTHER | Facility: HOSPITAL | Age: 73
End: 2023-04-04

## 2023-04-05 ENCOUNTER — PATIENT OUTREACH (OUTPATIENT)
Dept: EDUCATION SERVICES | Facility: HOSPITAL | Age: 73
End: 2023-04-05

## 2023-04-05 NOTE — PROGRESS NOTES
Returned pt's call.  Pt states diarrhea has improved with adding more fiber.  He is agreeable to continuing Trulicity at 3 mg weekly.  Glucose levels still 160-180 fasting.  Will follow up with him week of April 17th and if glucose levels with no improvement and diarrhea remains controlled pt would like to consider SGLT-2 vs increasing Trulicity.  Will speak with him then.

## 2023-04-17 ENCOUNTER — PATIENT OUTREACH (OUTPATIENT)
Dept: EDUCATION SERVICES | Facility: HOSPITAL | Age: 73
End: 2023-04-17

## 2023-04-17 NOTE — PROGRESS NOTES
Called pt today regarding glucose levels.  Pt states diarrhea remains controlled with more fiber in diet.  Current diabetes medication - Trulicity 3 mg weekly.  He reports fasting glucose levels remain most often in 160-180's.   He prefers to add additional medication vs increase dose of Trulicity r/t diarrhea issue.  Fax sent to Dr. London to begin Jardiance 10 mg daily.  Awaiting reply.

## 2023-04-19 DIAGNOSIS — E11.65 TYPE 2 DIABETES MELLITUS WITH HYPERGLYCEMIA, WITHOUT LONG-TERM CURRENT USE OF INSULIN (H): Primary | ICD-10-CM

## 2023-04-19 NOTE — TELEPHONE ENCOUNTER
Pediatric Gastroenterology,   Hepatology, and Nutrition               Outpatient follow up consultation    Consultation requested by [facility]     Diagnoses:  Patient Active Problem List   Diagnosis     Slow transit constipation     Lactose intolerance         HPI: Claudia is a 20 year old female with multiple allergies, constipation, lactose intolerance, and abdominal pain.      Claudia had been doing well until fall when she started to have more trouble with nausea.  She first attributed to the new water at school as her symptoms happen only with drinking water.  She reports that when she drank water she would get nauseous without any vomiting, and then at times will develop pain in the epigastric region that radiated to both her flanks.  She did feel very weak after the nausea and run down.  The symptoms did not happen with food.    About a week ago her symptoms acutely worsened and she started to have the pain and nausea with food and liquids.  She was seen at a local emergency department and reports that lab work at that time was normal (labs not available to me today in clinic).  2 days later she has seen at her gynecologist's office and had a pelvic ultrasound which was normal.    She had not stooled for a week and so was recommended that she do a cleanout when she did this weekend.  She reports that she had 2 large stools and then was just having liquid out.  She states that over the last several days her pain has improved she is able to eat but she continues to have trouble with the nausea.  She did not start the omeprazole.    She denies regurgitation or sour taste in her mouth.  She received 4 days of Zantac which did not help her symptoms.    She does not feel that she is really stressed her that stress is contributing to her symptoms.  She does note that she has had increased burping over the last week.    Stool: Every other day, mashed potato in consistency.  No pain or blood.   Please sign pended order for Jardiance.  Received approval from Dr. London.  Thanks!    Miralax 1 cap daily.    Water: 4 large glasses a day    Claudia has had weight gain since I last saw her in clinic about 1 year ago, although she has had weight loss of about 11 lbs over the last 6 months based on outside weights.    She had a pelvic US on Friday which was normal, she also had gonorrhea and chlamydia screening which was negative.      Review of Systems: A complete 10 point review of systems was negative except as note in this note and below.        Allergies: Lentil; Peas; Cats; Cumin oil; Nutmeg oil (myristica oil); Nuts; and Seasonal allergies  Prescription Medications as of 3/12/2018             polyethylene glycol (MIRALAX) powder Take 17 g (1 capful) by mouth daily    Fexofenadine HCl (ALLEGRA PO) Take 180 mg by mouth daily as needed     Montelukast Sodium (SINGULAIR PO) Take by mouth At Bedtime     polyethylene glycol (MIRALAX) powder Take 1 capful by mouth daily    omeprazole (PRILOSEC) 40 MG capsule Take 1 capsule (40 mg) by mouth daily Take 30-60 minutes before a meal.          Past Medical History: I have reviewed this patient's past medical history and updated as appropriate.   Past Medical History:   Diagnosis Date     Constipation      Eructation      Slow transit constipation         Past Surgical History: I have reviewed this patient's past medical history and updated as appropriate.   Past Surgical History:   Procedure Laterality Date     COLONOSCOPY N/A 12/2/2016    Procedure: COMBINED COLONOSCOPY, SINGLE OR MULTIPLE BIOPSY/POLYPECTOMY BY BIOPSY;  Surgeon: Yani Bella MD;  Location: [facility]      ESOPHAGOSCOPY, GASTROSCOPY, DUODENOSCOPY (EGD), COMBINED N/A 12/2/2016    Procedure: COMBINED ESOPHAGOSCOPY, GASTROSCOPY, DUODENOSCOPY (EGD), BIOPSY SINGLE OR MULTIPLE;  Surgeon: Yani Bella MD;  Location: [facility]        Family History:   Maternal grandmother with reflux, Cash's esophagus and esophageal cancer. 2 older sisters  "with GERD, 1 of whom has vocal cord dysfunction 2/2 GERD. Negative for: Cystic fibrosis, Celiac disease, Crohn's disease, Ulcerative Colitis, Polyposis syndromes, Hepatitis, Other liver disorders, Pancreatitis, GI cancers in young family members, Thyroid disease, Insulin dependent diabetes.     Social History: Is currently in college at [employer] in [place] [facility] and is enjoying school.  She got an extension for her finals due to the fact she ahs not been feeling well.    She is a senior and is going to [employer] in the fall    Physical exam:  Vital Signs: Ht 5' 5.32\" (165.9 cm)  Wt 153 lb 14.1 oz (69.8 kg)  BMI 25.36 kg/m2. (66 %ile based on CDC 2-20 Years stature-for-age data using vitals from 3/12/2018. 84 %ile based on CDC 2-20 Years weight-for-age data using vitals from 3/12/2018. Body mass index is 25.36 kg/(m^2). 81 %ile based on CDC 2-20 Years BMI-for-age data using vitals from 3/12/2018.)  Constitutional: Healthy, alert and no distress  Head: Normocephalic. No masses, lesions, tenderness or abnormalities  Neck: Neck supple.  EYE: MAICO, EOMI, anicteric  ENT: Ears: Normal position, Nose: No discharge and Mouth: Normal, moist mucous membranes  Cardiovascular: Heart: Regular rate and rhythm  Respiratory: Lungs clear to auscultation bilaterally.  Gastrointestinal: Abdomen:, Soft, Nontender, Nondistended, Normal bowel sounds, No hepatomegaly, No splenomegaly, Rectal: Deferred  Musculoskeletal: Extremities warm, well perfused.   Skin: No suspicious lesions or rashes      I personally reviewed results of laboratory evaluation, imaging studies and past medical records that were available during this outpatient visit.      Assessment and Plan:  19 yo female with nausea, abdominal pain, lactose intolerance, and constipation.  Most likely she has functional dyspepsia/nasuea and symptoms have been worsened by her chronic constipation.  GERD must also be considered as a possible cause of symptoms.  " Given reported normal lab workup and symptoms history, gallbladder, liver, and pancreatic disease is unlikely.      -Continue Miralax 1 cap daily, if not stooling daily recommend increasing to 1 cap 2 times a day  -Fluid goal of 80oz a day to help soften stools   -Start omeprazole 40 mg daily  -Start enteric coated peppermint oil PRN nausea, if symptoms not improving can consider a trial of cyproheptadine given recent weight loss.  Would also consider repeating bloodwork (CMP, CBC, lipase, and amylase)  -Continue to avoid lactose  -Encouraged to call if symptoms not improving  -I discussed with Claudia that I do not feel like the water is the cause of her symptoms, but may have been a trigger for symptoms starting.  I also explained the pathophysiology of functional disorders and natural course       No orders of the defined types were placed in this encounter.        I discussed the plan of care with Claudia and her mom including symptoms , differential diagnosis, diagnostic work up, treament , potential side effects and complications and follow up plan.       Follow up: Data Unavailable or earlier should patient become symptomatic.      Yani Bella MD, [facility]  Pediatric Gastroenterology  [facility]    CC  Patient Care Team:  [facility], [facility] as PCP - General  Yani Bella MD as MD (Pediatrics)

## 2023-05-08 ENCOUNTER — PATIENT OUTREACH (OUTPATIENT)
Dept: EDUCATION SERVICES | Facility: HOSPITAL | Age: 73
End: 2023-05-08

## 2023-05-08 DIAGNOSIS — E11.65 TYPE 2 DIABETES MELLITUS WITH HYPERGLYCEMIA, WITHOUT LONG-TERM CURRENT USE OF INSULIN (H): Primary | ICD-10-CM

## 2023-05-08 NOTE — PROGRESS NOTES
Called pt today.  Current diabetes medications:  Trulicity 3 mg weekly, Jardiance 10 mg daily.  Pt has been taking Jardiance for approximately 3 weeks.  He notes no side effects.  Reports drinking adequate fluids.  Glucose levels in 160's fasting.  Pt will take 10 mg pills until gone and then increase to 25 mg daily as long as he continues to tolerate.  Will speak with him again at the beginning of June to review glucose levels.

## 2023-06-06 ENCOUNTER — PATIENT OUTREACH (OUTPATIENT)
Dept: EDUCATION SERVICES | Facility: HOSPITAL | Age: 73
End: 2023-06-06

## 2023-06-06 NOTE — PROGRESS NOTES
Called pt today regarding glucose levels.  Current diabetes medications: Trulicity 3 mg weekly, Jardiance 25 mg daily (pt has been taking this dose x 8 days).  He reports no side effects.  Fasting glucose levels 122 and 149 the last two days.  Pt will call with any concerns and return in approximately six weeks for glucose review/A1c check.

## 2023-07-06 ENCOUNTER — OFFICE VISIT (OUTPATIENT)
Dept: PODIATRY | Facility: OTHER | Age: 73
End: 2023-07-06
Attending: PODIATRIST
Payer: COMMERCIAL

## 2023-07-06 VITALS
TEMPERATURE: 97.4 F | SYSTOLIC BLOOD PRESSURE: 146 MMHG | WEIGHT: 250.5 LBS | OXYGEN SATURATION: 96 % | DIASTOLIC BLOOD PRESSURE: 80 MMHG | BODY MASS INDEX: 35.86 KG/M2 | HEIGHT: 70 IN | HEART RATE: 89 BPM

## 2023-07-06 DIAGNOSIS — M76.62 ACHILLES TENDINITIS OF LEFT LOWER EXTREMITY: ICD-10-CM

## 2023-07-06 DIAGNOSIS — L60.3 ONYCHODYSTROPHY: Primary | ICD-10-CM

## 2023-07-06 DIAGNOSIS — E11.9 DIABETES MELLITUS TYPE 2, NONINSULIN DEPENDENT (H): ICD-10-CM

## 2023-07-06 PROBLEM — E66.01 CLASS 2 SEVERE OBESITY DUE TO EXCESS CALORIES WITH SERIOUS COMORBIDITY IN ADULT (H): Status: ACTIVE | Noted: 2023-07-06

## 2023-07-06 PROBLEM — E66.812 CLASS 2 SEVERE OBESITY DUE TO EXCESS CALORIES WITH SERIOUS COMORBIDITY IN ADULT (H): Status: ACTIVE | Noted: 2023-07-06

## 2023-07-06 PROCEDURE — G0463 HOSPITAL OUTPT CLINIC VISIT: HCPCS | Mod: 25

## 2023-07-06 PROCEDURE — 11721 DEBRIDE NAIL 6 OR MORE: CPT | Mod: GZ | Performed by: PODIATRIST

## 2023-07-06 PROCEDURE — 99213 OFFICE O/P EST LOW 20 MIN: CPT | Mod: 25 | Performed by: PODIATRIST

## 2023-07-06 PROCEDURE — G0463 HOSPITAL OUTPT CLINIC VISIT: HCPCS

## 2023-07-06 NOTE — PROGRESS NOTES
"Chief complaint: Patient presents with:  RECHECK: DFE, Toenails trimming       History of Present Illness: This 73 year old NIDDM II male is seen at the request of No ref. provider found for evaluation and suggestions of management of elongated, thickened toenails.     He awoke three weeks ago in mid June, 2023, and his LEFT Achilles tendon caused him a lot of pain. Rest has improved the pain, but it is still painful. First step pain is very painful, but he can limit pain if he limits the DORSIFLEXION of of the ankle. He says he can't get in to see his PCP until 07/18/2023. He is wondering how to treat this.    No further pedal complaints today.       Last HbA1C was 6.9% on 09/26/2022.  Last self-reported A1C was 8.2% in March, 2023.    Lab Results   Component Value Date    A1C 7.0 06/22/2022    A1C 10.2 03/21/2022    A1C 6.5 09/20/2021    A1C 10.2 06/16/2021    A1C 6.9 01/29/2020           BP (!) 146/80   Pulse 89   Temp 97.4  F (36.3  C) (Tympanic)   Ht 1.778 m (5' 10\")   Wt 113.6 kg (250 lb 8 oz)   SpO2 96%   BMI 35.94 kg/m      Patient Active Problem List   Diagnosis     Type 2 diabetes mellitus without complication, without long-term current use of insulin (H)     Mixed hyperlipidemia     Obesity     Nocturia     Vitamin D deficiency     Essential hypertension     Dry eye syndrome of both eyes     Diarrhea     Benign prostatic hyperplasia without lower urinary tract symptoms     Age-related nuclear cataract of both eyes     Tear film insufficiency, bilateral     Anatomical narrow angle of both eyes     SREE (obstructive sleep apnea)     Insomnia       Past Surgical History:   Procedure Laterality Date     APPENDECTOMY      1980's     CARPAL TUNNEL RELEASE RT/LT Left      COLONOSCOPY  12/2014    colonoscopy with Dr Horn, repeat in 5 years     ENT SURGERY      nasal polyps     EYE SURGERY      laser iridotomy     MOUTH SURGERY  07/21/2014    oral surgery     ORTHOPEDIC SURGERY Right 1995    rotator cuff " repair     ORTHOPEDIC SURGERY Left 2002    left shoulder rotator cuff repair x's 2     ORTHOPEDIC SURGERY Left     ulnar nerve surgery       Current Outpatient Medications   Medication     ascorbic acid 1000 MG TABS tablet     aspirin 81 MG tablet     blood glucose (ACCU-CHEK GUIDE) test strip     blood glucose monitoring (SOFTCLIX) lancets     Carboxymethylcellulose Sodium (REFRESH TEARS OP)     Dulaglutide (TRULICITY) 3 MG/0.5ML SOPN     empagliflozin (JARDIANCE) 25 MG TABS tablet     ENALAPRIL-HYDROCHLOROTHIAZIDE PO     finasteride (PROSCAR) 5 MG tablet     mirtazapine (REMERON) 15 MG tablet     Multiple Vitamin (MULTIVITAMIN ADULT PO)     PANTOPRAZOLE SODIUM PO     Probiotic Product (PROBIOTIC-10) CHEW     rosuvastatin (CRESTOR) 10 MG tablet     tamsulosin (FLOMAX) 0.4 MG capsule     VITAMIN D, CHOLECALCIFEROL, PO     No current facility-administered medications for this visit.          Allergies   Allergen Reactions     Flexeril [Cyclobenzaprine]      Codeine Anxiety     hyperactivity       No Clinical Screening - See Comments Anxiety     All Narcotics give him hyperactivity.        Family History   Problem Relation Age of Onset     Hypertension Mother      Cancer Mother      Cerebrovascular Disease Mother      CABG Father      Hypertension Sister      Cerebrovascular Disease Sister      Cerebrovascular Disease Sister        Social History     Socioeconomic History     Marital status:      Spouse name: None     Number of children: None     Years of education: None     Highest education level: None   Tobacco Use     Smoking status: Former     Years: 25.00     Types: Cigarettes     Quit date:      Years since quittin.2     Smokeless tobacco: Never   Substance and Sexual Activity     Alcohol use: Yes     Comment: social     Drug use: No       ROS: 10 point ROS neg other than the symptoms noted above in the HPI.  EXAM  Constitutional: healthy, alert and no distress    Psychiatric: mentation appears  normal and affect normal/bright    VASCULAR:  -Dorsalis pedis pulse +2/4 b/l  -Posterior tibial pulse +1/4 b/l  -Capillary refill time < 3 seconds to b/l hallux  -Hair growth Absent to b/l anterior legs and ankles  NEURO:  -Light touch sensation intact to b/l plantar forefoot  DERM:  -Skin temperature within normal limits bilaterally   -Skin mild-to-moderately erythematous with flaking of skin to bilateral plantar feet in a moccasin distribution  -Toenails elongated, thickened, dystrophic and discolored x 10  MSK:  -Pain on palpation to the LEFT Achilles tendon insertion  -Muscle strength of ankles +5/5 for dorsiflexion, plantarflexion, ABDUction and ADDuction b/l    -Ankle joint passive ROM within normal limits except for dorsiflexion:    Dorsiflexion, RIGHT Straight knee 0 degrees    Dorsiflexion, LEFT Straight knee 0 degrees    ============================================================    ASSESSMENT:  (L60.3) Onychodystrophy  (primary encounter diagnosis)    (M76.62) Achilles tendinitis of left lower extremity    (E11.9) Diabetes mellitus type 2, noninsulin dependent (H)          PLAN:  -Patient evaluated and examined. Treatment options discussed with no educational barriers noted.    -High risk toenail debridement x 10 toenails without incident    -Diabetic Foot Education provided. This included checking the feet daily looking for new new blisters or wounds, wearing shoes at all times when walking including around the house, and avoiding lotion application between the toes. If there are any signs of infection, the patient should present to the ED as soon as possible. Infections of the foot can be life threatening or lead to amputations of the foot or leg.    Diabetes Mellitus: Patient's DM is managed by their PCP. The DM appears to be stable although the blood sugars have recently increased. Patient's last HbA1C was 6.9% on 09/26/2022. His last self-reported HbA1C was 8.2% in March, 2023.    -DM shoes: Patient  declined on 04/03/2023. A stability tennis shoe and insert may also help reduce his Achilles tendon pain, but he is still declining this.    -Discussed Achilles tendon pain:  ---Discussed etiology and treatment options for Achilles tendon pain. Patient has tried conservative treatment options including rest, but the patient still has a moderate bilateral gastroc equinus which contributes to this pain. The patient has not yet attempted physical therapy or inserts which can help improve pain. The Achilles pain relief can be a slow process, but consistency with treatment is important.  ---A heel lift may help decrease pain for days when the patient is very busy and doing a lot of walking, but it is not recommended for long-term treatment as it may worsen the Achilles contracture.   ---Will exhaust conservative treatment before considering surgical options.  ---Physical therapy referral placed on 07/06/2023.  ---If pain worsens, patient is to call and a tapering prednisone and/or CAM boot boot will be considered.    -Patient in agreement with the above treatment plan and all of patient's questions were answered.      Return to clinic 3 months for diabetic foot exam and high risk nail debridement         Renée Evans DPM

## 2023-07-18 ENCOUNTER — HOSPITAL ENCOUNTER (OUTPATIENT)
Dept: EDUCATION SERVICES | Facility: HOSPITAL | Age: 73
Discharge: HOME | End: 2023-07-18
Attending: NURSE PRACTITIONER | Admitting: NURSE PRACTITIONER
Payer: COMMERCIAL

## 2023-07-18 VITALS
HEIGHT: 70 IN | RESPIRATION RATE: 16 BRPM | WEIGHT: 248.9 LBS | DIASTOLIC BLOOD PRESSURE: 66 MMHG | HEART RATE: 77 BPM | SYSTOLIC BLOOD PRESSURE: 98 MMHG | BODY MASS INDEX: 35.63 KG/M2 | OXYGEN SATURATION: 98 %

## 2023-07-18 DIAGNOSIS — E11.9 TYPE 2 DIABETES MELLITUS WITHOUT COMPLICATION, WITHOUT LONG-TERM CURRENT USE OF INSULIN (H): Primary | ICD-10-CM

## 2023-07-18 LAB — HBA1C MFR BLD: 7.5 % (ref 4.3–?)

## 2023-07-18 PROCEDURE — 83036 HEMOGLOBIN GLYCOSYLATED A1C: CPT | Performed by: DIETITIAN, REGISTERED

## 2023-07-18 PROCEDURE — G0108 DIAB MANAGE TRN  PER INDIV: HCPCS | Performed by: DIETITIAN, REGISTERED

## 2023-07-18 ASSESSMENT — PAIN SCALES - GENERAL: PAINLEVEL: NO PAIN (0)

## 2023-07-18 NOTE — PROGRESS NOTES
Diabetes Self-Management Education & Support    Presents for: Individual review    Type of Service: In Person Visit    ASSESSMENT:  A1c today was 7.5% which is down from 8.7% at last check in February.  Pt started Jardiance at that time and is tolerating without any reported side effects.  He currently is having some issues with his achilles and back pain which is hindering his ability to ride his bike for exercise.  I suspect if he was able to be active A1c would have noted even more improvement.  Pt is seeing provider this week regarding above issues.  Eye exam and foot exam are up to date.      Patient's most recent   Lab Results   Component Value Date    A1C 7.0 06/22/2022    HEMOGLOBINA1 7.5 07/18/2023     is not meeting goal of <7.5% for age.     Diabetes knowledge and skills assessment:   Patient is knowledgeable in diabetes management concepts related to: Healthy Eating, Being Active, Monitoring, Taking Medication, Problem Solving, Reducing Risks and Healthy Coping    Based on learning assessment above, most appropriate setting for further diabetes education would be: Individual setting.      PLAN  Continue to work on healthy food choices.  Exercise as able.   Test glucose 1x/day at alternating times.     Follow-up: 3 months - A1c check    See Care Plan for co-developed, patient-state behavior change goals.  AVS provided for patient today.    Education Materials Provided:  No new materials today.     SUBJECTIVE/OBJECTIVE:  Presents for: Individual review  Accompanied by: Self  Diabetes education in the past 24mo: Yes  Focus of Visit: Assistance w/ making life changes  Diabetes type: Type 2  Date of diagnosis: 2008  Disease course: Improving  How confident are you filling out medical forms by yourself:: Extremely  Diabetes management related comments/concerns: Pt has been unable to exercise much as of late as he hurt his achilles and is now having some back problems.  Sees provider later this  "week.  Transportation concerns: No  Difficulty affording diabetes medication?: No  Difficulty affording diabetes testing supplies?: No  Other concerns:: None  Cultural Influences/Ethnic Background:  Not  or     Diabetes Symptoms & Complications:  Fatigue: No  Neuropathy: No  Polydipsia: No  Polyphagia: No  Polyuria: No  Visual change: No  Slow healing wounds: No  Symptom course: Stable  Weight trend: Increasing (Weight is down 9# since last visit March 2023 - note however at last visit pt had a 9# weight gain.)  Complications assessed today?: Yes  CVA: No  Heart disease: No  Nephropathy: No  Retinopathy: No    Patient Problem List and Family Medical History reviewed for relevant medical history, current medical status, and diabetes risk factors.    Vitals:  BP 98/66   Pulse 77   Resp 16   Ht 1.765 m (5' 9.5\")   Wt 112.9 kg (248 lb 14.4 oz)   SpO2 98%   BMI 36.23 kg/m    Estimated body mass index is 36.23 kg/m  as calculated from the following:    Height as of this encounter: 1.765 m (5' 9.5\").    Weight as of this encounter: 112.9 kg (248 lb 14.4 oz).   Last 3 BP:   BP Readings from Last 3 Encounters:   07/18/23 98/66   07/06/23 (!) 146/80   04/03/23 (!) 144/89       History   Smoking Status     Former     Years: 25.00     Types: Cigarettes     Quit date: 1991   Smokeless Tobacco     Never       Labs:  Lab Results   Component Value Date    A1C 7.0 06/22/2022    HEMOGLOBINA1 7.5 07/18/2023     Lab Results   Component Value Date     06/16/2021     Lab Results   Component Value Date    LDL 99 12/16/2019     HDL Cholesterol   Date Value Ref Range Status   12/16/2019 30 (L) >=60 mg/dL Final   ]  GFR Estimate   Date Value Ref Range Status   06/16/2021 90 >60 mL/min/[1.73_m2] Final     Comment:     Non  GFR Calc  Starting 12/18/2018, serum creatinine based estimated GFR (eGFR) will be   calculated using the Chronic Kidney Disease Epidemiology Collaboration   (CKD-EPI) equation.   "     GFR Estimate If Black   Date Value Ref Range Status   06/16/2021 >90 >60 mL/min/[1.73_m2] Final     Comment:      GFR Calc  Starting 12/18/2018, serum creatinine based estimated GFR (eGFR) will be   calculated using the Chronic Kidney Disease Epidemiology Collaboration   (CKD-EPI) equation.       Lab Results   Component Value Date    CR 0.81 06/16/2021     No results found for: MICROALBUMIN    Healthy Eating:  Healthy Eating Assessed Today: Yes  Cultural/Oriental orthodox diet restrictions?: No  Meal planning/habits: Avoiding sweets  How many times a week on average do you eat food made away from home (restaurant/take-out)?: 1 (2 burgers - no fries)  Meals include: Breakfast, Lunch, Dinner  Breakfast: egg sandwich on toast or bagel, banana or banana muffin  Lunch: veggies with dip  Dinner: soup and salad with Swedish dressing or 3 slices pizza or meat, starch, veggies  Snacks: occasional sugar free cookies (1-2) or sugar free candy  Beverages: Water, Coffee, Diet soda, Other (Crystal Lite)  Has patient met with a dietitian in the past?: Yes    Being Active:  Being Active Assessed Today: Yes  Exercise:: Currently not exercising (Has E-Bike and a treadmill but unable to use r/t achilles/back pain.)  Days per week of moderate to strenuous exercise (like a brisk walk): 0  Barrier to exercise: Physical limitation (Having issues with achilles and back pain.)    Monitoring:  Monitoring Assessed Today: Yes  Did patient bring glucose meter to appointment? : Yes  Blood Glucose Meter: One Touch (Verio)  Times checking blood sugar at home (number): 1  Times checking blood sugar at home (per): Day  Blood glucose trend: Decreasing  Fasting-137, 134, 151, 237, 159, 148, 140, 149, 140, 147, 140, 136, 145    Taking Medications:  Diabetes Medication(s)     Sodium-Glucose Co-Transporter 2 (SGLT2) Inhibitors       empagliflozin (JARDIANCE) 25 MG TABS tablet    Take 1 tablet (25 mg) by mouth daily    Incretin Mimetic Agents        Dulaglutide (TRULICITY) 3 MG/0.5ML SOPN    Inject 3 mg Subcutaneous once a week          Taking Medication Assessed Today: Yes  Current Treatments: Non-insulin Injectables, Oral Medication (taken by mouth) (Trulicity 3 mg weekly, Jardiance 25 mg am)  Problems taking diabetes medications regularly?: No  Diabetes medication side effects?: Yes (Did not tolerate Metformin r/t gi side effects.  Pt has been having some intermittent diarrhea since increasing Trulicity to 3 mg dose but is able to control by eating more fiber.)    Problem Solving:  Problem Solving Assessed Today: Yes  Is the patient at risk for hypoglycemia?: No  Hypoglycemia Frequency: Never  Is the patient at risk for DKA?: No    Reducing Risks:  Reducing Risks Assessed Today: Yes  Diabetes Risks: Age over 45 years  CAD Risks: Diabetes Mellitus  Has dilated eye exam at least once a year?: Yes  Sees dentist every 6 months?: No  Feet checked by healthcare provider in the last year?: Yes    Healthy Coping:  Healthy Coping Assessed Today: Yes  Emotional response to diabetes: Acceptance  Informal Support system:: Spouse  Stage of change: MAINTENANCE (Working to maintain change, with risk of relapse)  Patient Activation Measure Survey Score:       No data to display                Time Spent: 45 minutes  Encounter Type: Individual    Any diabetes medication dose changes were made via the CDE Protocol per the patient's referring provider. A copy of this encounter was shared with the provider.

## 2023-07-18 NOTE — LETTER
7/18/2023        RE: Donis Watt  [address] [facility] [address]  [facility] [address]        Diabetes Self-Management Education & Support    Presents for: Individual review    Type of Service: In Person Visit    ASSESSMENT:  A1c today was 7.5% which is down from 8.7% at last check in February.  Pt started Jardiance at that time and is tolerating without any reported side effects.  He currently is having some issues with his achilles and back pain which is hindering his ability to ride his bike for exercise.  I suspect if he was able to be active A1c would have noted even more improvement.  Pt is seeing provider this week regarding above issues.  Eye exam and foot exam are up to date.      Patient's most recent   Lab Results   Component Value Date    A1C 7.0 06/22/2022    HEMOGLOBINA1 7.5 07/18/2023     is not meeting goal of <7.5% for age.     Diabetes knowledge and skills assessment:   Patient is knowledgeable in diabetes management concepts related to: Healthy Eating, Being Active, Monitoring, Taking Medication, Problem Solving, Reducing Risks and Healthy Coping    Based on learning assessment above, most appropriate setting for further diabetes education would be: Individual setting.      PLAN  Continue to work on healthy food choices.  Exercise as able.   Test glucose 1x/day at alternating times.     Follow-up: 3 months - A1c check    See Care Plan for co-developed, patient-state behavior change goals.  AVS provided for patient today.    Education Materials Provided:  No new materials today.     SUBJECTIVE/OBJECTIVE:  Presents for: Individual review  Accompanied by: Self  Diabetes education in the past 24mo: Yes  Focus of Visit: Assistance w/ making life changes  Diabetes type: Type 2  Date of diagnosis: 2008  Disease course: Improving  How confident are you filling out medical forms by yourself:: Extremely  Diabetes management related comments/concerns: Pt has been unable to exercise much as of late as he  "hurt his achilles and is now having some back problems.  Sees provider later this week.  Transportation concerns: No  Difficulty affording diabetes medication?: No  Difficulty affording diabetes testing supplies?: No  Other concerns:: None  Cultural Influences/Ethnic Background:  Not  or     Diabetes Symptoms & Complications:  Fatigue: No  Neuropathy: No  Polydipsia: No  Polyphagia: No  Polyuria: No  Visual change: No  Slow healing wounds: No  Symptom course: Stable  Weight trend: Increasing (Weight is down 9# since last visit March 2023 - note however at last visit pt had a 9# weight gain.)  Complications assessed today?: Yes  CVA: No  Heart disease: No  Nephropathy: No  Retinopathy: No    Patient Problem List and Family Medical History reviewed for relevant medical history, current medical status, and diabetes risk factors.    Vitals:  BP 98/66   Pulse 77   Resp 16   Ht 1.765 m (5' 9.5\")   Wt 112.9 kg (248 lb 14.4 oz)   SpO2 98%   BMI 36.23 kg/m    Estimated body mass index is 36.23 kg/m  as calculated from the following:    Height as of this encounter: 1.765 m (5' 9.5\").    Weight as of this encounter: 112.9 kg (248 lb 14.4 oz).   Last 3 BP:   BP Readings from Last 3 Encounters:   07/18/23 98/66   07/06/23 (!) 146/80   04/03/23 (!) 144/89       History   Smoking Status     Former     Years: 25.00     Types: Cigarettes     Quit date: 1991   Smokeless Tobacco     Never       Labs:  Lab Results   Component Value Date    A1C 7.0 06/22/2022    HEMOGLOBINA1 7.5 07/18/2023     Lab Results   Component Value Date     06/16/2021     Lab Results   Component Value Date    LDL 99 12/16/2019     HDL Cholesterol   Date Value Ref Range Status   12/16/2019 30 (L) >=60 mg/dL Final   ]  GFR Estimate   Date Value Ref Range Status   06/16/2021 90 >60 mL/min/[1.73_m2] Final     Comment:     Non  GFR Calc  Starting 12/18/2018, serum creatinine based estimated GFR (eGFR) will be   calculated " using the Chronic Kidney Disease Epidemiology Collaboration   (CKD-EPI) equation.       GFR Estimate If Black   Date Value Ref Range Status   06/16/2021 >90 >60 mL/min/[1.73_m2] Final     Comment:      GFR Calc  Starting 12/18/2018, serum creatinine based estimated GFR (eGFR) will be   calculated using the Chronic Kidney Disease Epidemiology Collaboration   (CKD-EPI) equation.       Lab Results   Component Value Date    CR 0.81 06/16/2021     No results found for: MICROALBUMIN    Healthy Eating:  Healthy Eating Assessed Today: Yes  Cultural/Taoism diet restrictions?: No  Meal planning/habits: Avoiding sweets  How many times a week on average do you eat food made away from home (restaurant/take-out)?: 1 (2 burgers - no fries)  Meals include: Breakfast, Lunch, Dinner  Breakfast: egg sandwich on toast or bagel, banana or banana muffin  Lunch: veggies with dip  Dinner: soup and salad with Malagasy dressing or 3 slices pizza or meat, starch, veggies  Snacks: occasional sugar free cookies (1-2) or sugar free candy  Beverages: Water, Coffee, Diet soda, Other (Crystal Lite)  Has patient met with a dietitian in the past?: Yes    Being Active:  Being Active Assessed Today: Yes  Exercise:: Currently not exercising (Has E-Bike and a treadmill but unable to use r/t achilles/back pain.)  Days per week of moderate to strenuous exercise (like a brisk walk): 0  Barrier to exercise: Physical limitation (Having issues with achilles and back pain.)    Monitoring:  Monitoring Assessed Today: Yes  Did patient bring glucose meter to appointment? : Yes  Blood Glucose Meter: One Touch (Verio)  Times checking blood sugar at home (number): 1  Times checking blood sugar at home (per): Day  Blood glucose trend: Decreasing  Fasting-137, 134, 151, 237, 159, 148, 140, 149, 140, 147, 140, 136, 145    Taking Medications:  Diabetes Medication(s)     Sodium-Glucose Co-Transporter 2 (SGLT2) Inhibitors       empagliflozin (JARDIANCE) 25  MG TABS tablet    Take 1 tablet (25 mg) by mouth daily    Incretin Mimetic Agents       Dulaglutide (TRULICITY) 3 MG/0.5ML SOPN    Inject 3 mg Subcutaneous once a week          Taking Medication Assessed Today: Yes  Current Treatments: Non-insulin Injectables, Oral Medication (taken by mouth) (Trulicity 3 mg weekly, Jardiance 25 mg am)  Problems taking diabetes medications regularly?: No  Diabetes medication side effects?: Yes (Did not tolerate Metformin r/t gi side effects.  Pt has been having some intermittent diarrhea since increasing Trulicity to 3 mg dose but is able to control by eating more fiber.)    Problem Solving:  Problem Solving Assessed Today: Yes  Is the patient at risk for hypoglycemia?: No  Hypoglycemia Frequency: Never  Is the patient at risk for DKA?: No    Reducing Risks:  Reducing Risks Assessed Today: Yes  Diabetes Risks: Age over 45 years  CAD Risks: Diabetes Mellitus  Has dilated eye exam at least once a year?: Yes  Sees dentist every 6 months?: No  Feet checked by healthcare provider in the last year?: Yes    Healthy Coping:  Healthy Coping Assessed Today: Yes  Emotional response to diabetes: Acceptance  Informal Support system:: Spouse  Stage of change: MAINTENANCE (Working to maintain change, with risk of relapse)  Patient Activation Measure Survey Score:       No data to display                Time Spent: 45 minutes  Encounter Type: Individual    Any diabetes medication dose changes were made via the CDE Protocol per the patient's referring provider. A copy of this encounter was shared with the provider.      Sincerely,        Ria Morillo RD

## 2023-08-02 ENCOUNTER — THERAPY VISIT (OUTPATIENT)
Dept: PHYSICAL THERAPY | Facility: HOSPITAL | Age: 73
End: 2023-08-02
Attending: PODIATRIST
Payer: COMMERCIAL

## 2023-08-02 DIAGNOSIS — M76.62 ACHILLES TENDINITIS OF LEFT LOWER EXTREMITY: ICD-10-CM

## 2023-08-02 PROCEDURE — 97163 PT EVAL HIGH COMPLEX 45 MIN: CPT | Mod: GP | Performed by: PHYSICAL THERAPIST

## 2023-08-02 NOTE — PROGRESS NOTES
"PHYSICAL THERAPY EVALUATION  Type of Visit: Evaluation    See electronic medical record for Abuse and Falls Screening details.    Subjective   Presenting condition or subjective complaint:  Patient work up in the morning with extreme pain in his left foot. Had difficulty straightening ankle with walking.  Currently feels a tightness in his posterior calf region.    Date of onset: May 15, 2023  Relevant medical history:   Arthritis, Diabetes, Josiane blood pressure, overweight  Dates & types of surgery:  RTC (2) right, (1) left, CTS left wrist, appendectomy    Prior diagnostic imaging/testing results:     None reported  Prior therapy history for the same diagnosis, illness or injury:        Prior Level of Function  Transfers: Independent  Ambulation: Independent  ADL: Independent  IADL: Driving, Finances, Housekeeping, Laundry, Meal preparation, Medication management    Living Environment  Social support:   Spouse  Type of home:   2 stories  Stairs to enter the home:       4  Ramp:   None  Stairs inside the home:       12  Help at home:  No  Equipment owned:   Straight cane, walker    Employment:    Retired  Hobbies/Interests:  Woodworking    Patient goals for therapy:  \"Not Hurt\"      Objective   FOOT/ANKLE EVALUATION  PAIN: Pain Level at Rest: 0/10  Pain Level with Use: 10/10  Pain Location: ankle  Pain Quality: Sharp  Pain Frequency: constant  Pain is Worst: nighttime  Pain is Exacerbated By: Pushing, prolonged walking  Pain is Relieved By: otc medications  Pain Progression: Improved    POSTURE: Standing Posture: Rounded shoulders, Forward head  Sitting Posture: Rounded shoulders, Forward head  GAIT: WFL  Weightbearing Status: WBAT  Assistive Device(s): Cane (single end), Walker (standard)  Gait Deviations: WNL  BALANCE/PROPRIOCEPTION: Single Leg Stance Eyes Open (seconds): fair  WEIGHT BEARING ALIGNMENT: Bilateral pronation of feet  NON-WEIGHTBEARING ALIGNMENT: WFL   ROM: AROM WFL Discomfort with left ankle " dorsiflexion    STRENGTH:  Right WFL,  left limited due to pain  FLEXIBILITY:  Tightness: HS, Piriformis, Gastroc/soleus  SPECIAL TESTS:  Negative Homans  FUNCTIONAL TESTS:  Pain left ankle with squatting  PALPATION:  Tender lateral achilles and lower gastroc/soleus  JOINT MOBILITY:     Assessment & Plan   CLINICAL IMPRESSIONS  Medical Diagnosis: Achilles Tendonitis    Treatment Diagnosis: Achilles tendonitis left with medial calf tenderness, tight achilles tendon, pain with walking and balance disturbance. Achilles Tendonitis  with associated trigger points in left calf, decreased flexibility and strength, balance disturbance    Impression/Assessment: Patient is a 73 year old male with left ankle pain  complaints.  The following significant findings have been identified: Pain, Decreased ROM/flexibility, Decreased strength, Impaired balance, Impaired gait, Impaired muscle performance, and Decreased activity tolerance. These impairments interfere with their ability to perform recreational activities, household chores, household mobility, and community mobility as compared to previous level of function.     Clinical Decision Making (Complexity):  Clinical Presentation: Evolving/Changing  Clinical Presentation Rationale: based on medical and personal factors listed in PT evaluation  Clinical Decision Making (Complexity): Moderate complexity    PLAN OF CARE  Treatment Interventions:  Modalities: Cryotherapy, Dry Needling, E-stim, Hot Pack, Iontophoresis, Ultrasound  Interventions: Gait Training, Manual Therapy, Therapeutic Exercise    Long Term Goals     PT Goal 1  Goal Identifier: STG #1  Goal Description: Patient will demonstrate a good understanding and compliance with HEP  Rationale: to maximize safety and independence with performance of ADLs and functional tasks  Target Date: 08/31/23  PT Goal 2  Goal Identifier: LTG #1  Goal Description: Patient will report of minimal discomfort in his left calf region with  palpation and motion for increased tolerance to walking and stairs.  Rationale: to maximize safety and independence with performance of ADLs and functional tasks  Target Date: 09/30/23  PT Goal 3  Goal Identifier: LTG #2  Goal Description: Patient will demonstrate improved foot positioning  and balance for the above stated goal.  Rationale: to maximize safety and independence with performance of ADLs and functional tasks  Target Date: 09/30/23      Frequency of Treatment: 2x/week  Duration of Treatment: 8 weeks    Recommended Referrals to Other Professionals:  None  Education Assessment:   Learner/Method: Patient;No Barriers to Learning    Risks and benefits of evaluation/treatment have been explained.   Patient/Family/caregiver agrees with Plan of Care.     Evaluation Time:     PT Eval, High Complexity Minutes (52727): 30       Signing Clinician: KATHY HAYES [facility]                                                                                   OUTPATIENT PHYSICAL THERAPY      PLAN OF TREATMENT FOR OUTPATIENT REHABILITATION   Patient's Last Name, First Name, Donis Serrano YOB: 1950   Provider's Name   [facility]   Medical Record No.  [id number]     Onset Date: 07/06/23 (Mid June 2023)  Start of Care Date:  8-2-2023     Medical Diagnosis:  Achilles Tendonitis      PT Treatment Diagnosis:  Achilles tendonitis left with medial calf tenderness, tight achilles tendon, pain with walking and balance disturbance. Plan of Treatment  Frequency/Duration: 2x/week/ 8 weeks    Certification date from   8-2-2023 to    9/30/2023       See note for plan of treatment details and functional goals     SANDRA MAHMOOD, PT                         I CERTIFY THE NEED FOR THESE SERVICES FURNISHED UNDER        THIS PLAN OF TREATMENT AND WHILE UNDER MY CARE     (Physician attestation of this document indicates review and certification of  the therapy plan).                Referring Provider:  Renée Evans      Initial Assessment  See Epic Evaluation-

## 2023-08-09 ENCOUNTER — THERAPY VISIT (OUTPATIENT)
Dept: PHYSICAL THERAPY | Facility: HOSPITAL | Age: 73
End: 2023-08-09
Attending: PODIATRIST
Payer: COMMERCIAL

## 2023-08-09 DIAGNOSIS — M76.62 ACHILLES TENDINITIS OF LEFT LOWER EXTREMITY: Primary | ICD-10-CM

## 2023-08-09 PROCEDURE — 97110 THERAPEUTIC EXERCISES: CPT | Mod: GP | Performed by: PHYSICAL THERAPIST

## 2023-08-16 ENCOUNTER — THERAPY VISIT (OUTPATIENT)
Dept: PHYSICAL THERAPY | Facility: HOSPITAL | Age: 73
End: 2023-08-16
Attending: PODIATRIST
Payer: COMMERCIAL

## 2023-08-16 DIAGNOSIS — M76.60 ACHILLES TENDONITIS: Primary | ICD-10-CM

## 2023-08-16 PROCEDURE — 97110 THERAPEUTIC EXERCISES: CPT | Mod: GP,CQ

## 2023-08-21 ENCOUNTER — THERAPY VISIT (OUTPATIENT)
Dept: PHYSICAL THERAPY | Facility: HOSPITAL | Age: 73
End: 2023-08-21
Attending: PODIATRIST
Payer: COMMERCIAL

## 2023-08-21 DIAGNOSIS — M76.62 ACHILLES TENDINITIS OF LEFT LOWER EXTREMITY: Primary | ICD-10-CM

## 2023-08-21 PROCEDURE — 97110 THERAPEUTIC EXERCISES: CPT | Mod: GP | Performed by: PHYSICAL THERAPIST

## 2023-09-05 ENCOUNTER — THERAPY VISIT (OUTPATIENT)
Dept: PHYSICAL THERAPY | Facility: HOSPITAL | Age: 73
End: 2023-09-05
Attending: PODIATRIST
Payer: COMMERCIAL

## 2023-09-05 DIAGNOSIS — M76.62 ACHILLES TENDINITIS OF LEFT LOWER EXTREMITY: Primary | ICD-10-CM

## 2023-09-05 PROCEDURE — 97110 THERAPEUTIC EXERCISES: CPT | Mod: GP | Performed by: PHYSICAL THERAPIST

## 2023-09-05 NOTE — PROGRESS NOTES
09/05/23 0500   Appointment Info   Signing clinician's name / credentials Crystal Briggs PT, JOSE   Visits Used 5  [employer] [place] medicare Replacement   Medical Diagnosis Achilles Tendonitis   PT Tx Diagnosis Achilles tendonitis left with medial calf tenderness, tight achilles tendon, pain with walking and balance disturbance.   Precautions/Limitations Exercise without increasing pain   Other pertinent information Please administer the Alfredson protocol and gradually increase the intensity of the PT to decrease this pain (please work on bilateral legs). Please also provide other modalities per your recommendation. Thank you.   Progress Note/Certification   Onset of illness/injury or Date of Surgery 07/06/23   Therapy Frequency 2x/week   Predicted Duration 8 weeks   Progress Note Due Date 09/30/23       Present No   GOALS   PT Goals 2;3;4   PT Goal 1   Goal Identifier STG #1   Goal Description Patient will demonstrate a good understanding and compliance with HEP   Rationale to maximize safety and independence with performance of ADLs and functional tasks   Goal Progress Met   Target Date 08/31/23   Date Met 08/21/23   PT Goal 2   Goal Identifier LTG #1   Goal Description Patient will report of minimal discomfort in his left calf region with palpation and motion for increased tolerance to walking and stairs.   Rationale to maximize safety and independence with performance of ADLs and functional tasks   Goal Progress Met   Target Date 09/30/23   Date Met 09/05/23   PT Goal 3   Goal Identifier LTG #2   Goal Description Patient will demonstrate improved foot positioning  and balance for the above stated goal.   Rationale to maximize safety and independence with performance of ADLs and functional tasks   Goal Progress Met   Target Date 09/30/23   Date Met 09/05/23   Subjective Report   Subjective Report Patient reports that he is doing very well. Walked 35 kilometers yesterday and had no problems  "with fishing. Reports of compliance with HEP.   Objective Measures   Objective Measures Objective Measure 1;Objective Measure 2;Objective Measure 3   Objective Measure 1   Objective Measure Motion   Details WFL   Objective Measure 2   Objective Measure Palpation   Details No pain with palpation   Objective Measure 3   Objective Measure Balance   Details Improving   Treatment Interventions (PT)   Interventions Therapeutic Procedure/Exercise   Therapeutic Procedure/Exercise   Therapeutic Procedures: strength, endurance, ROM, flexibillity minutes (45775) 25   Ther Proc 1 stretching   Ther Proc 1 - Details Gastroc stretch (1/3/10\"), standing HS stretch (1/3/10\"B)   Ther Proc 2 Strengthening   Ther Proc 2 - Details Recumbent 6 minutes level 3, Access Code: [email or web address]  URL: [email or web address]  Date: 09/05/2023  Prepared by: Crystal Briggs    Exercises  - Supine Active Straight Leg Raise  - 1 x daily - 3 x weekly - 1-3 sets - 10 reps - 2 secondond hold  - Sidelying Hip Abduction  - 1 x daily - 3 x weekly - 1-3 sets - 10 reps - 2 second hold  - Supine Bridge  - 1 x daily - 3 x weekly - 1-3 sets - 10 reps - 5 second hold   Skilled Intervention as above   Patient Response/Progress Good   Education   Learner/Method Patient;No Barriers to Learning   Plan   Home program Access Code: [email or web address]  URL: [email or web address]  Date: 09/05/2023  Prepared by: Crystal Briggs    Exercises  - Supine Active Straight Leg Raise  - 1 x daily - 3 x weekly - 1-3 sets - 10 reps - 2 secondond hold  - Sidelying Hip Abduction  - 1 x daily - 3 x weekly - 1-3 sets - 10 reps - 2 second hold  - Supine Bridge  - 1 x daily - 3 x weekly - 1-3 sets - 10 reps - 5 second hold   Plan for next session Patient reports that he is ready to continue independently with HEP and symptom management,   Total Session Time   Timed Code Treatment Minutes 25   Total Treatment Time (sum of timed and untimed services) 25 "         DISCHARGE  Reason for Discharge: Patient has met all goals.    Equipment Issued: Theraband    Discharge Plan: Patient to continue home program.    Referring Provider:  Renée Evans DPM

## 2023-09-20 ENCOUNTER — TRANSFERRED RECORDS (OUTPATIENT)
Dept: HEALTH INFORMATION MANAGEMENT | Facility: CLINIC | Age: 73
End: 2023-09-20

## 2023-09-20 LAB
RETINOPATHY: NEGATIVE
RETINOPATHY: NEGATIVE

## 2023-10-25 ENCOUNTER — HOSPITAL ENCOUNTER (OUTPATIENT)
Dept: EDUCATION SERVICES | Facility: HOSPITAL | Age: 73
Discharge: HOME | End: 2023-10-25
Attending: DIETITIAN, REGISTERED | Admitting: DIETITIAN, REGISTERED
Payer: COMMERCIAL

## 2023-10-25 VITALS
DIASTOLIC BLOOD PRESSURE: 96 MMHG | RESPIRATION RATE: 16 BRPM | OXYGEN SATURATION: 96 % | HEART RATE: 83 BPM | BODY MASS INDEX: 36.04 KG/M2 | WEIGHT: 247.6 LBS | SYSTOLIC BLOOD PRESSURE: 164 MMHG

## 2023-10-25 DIAGNOSIS — E11.65 TYPE 2 DIABETES MELLITUS WITH HYPERGLYCEMIA, WITHOUT LONG-TERM CURRENT USE OF INSULIN (H): Primary | ICD-10-CM

## 2023-10-25 DIAGNOSIS — E11.65 TYPE 2 DIABETES MELLITUS WITH HYPERGLYCEMIA, WITHOUT LONG-TERM CURRENT USE OF INSULIN (H): ICD-10-CM

## 2023-10-25 DIAGNOSIS — E11.9 TYPE 2 DIABETES MELLITUS WITHOUT COMPLICATION, WITHOUT LONG-TERM CURRENT USE OF INSULIN (H): Primary | ICD-10-CM

## 2023-10-25 LAB
ANION GAP SERPL CALCULATED.3IONS-SCNC: 10 MMOL/L (ref 7–15)
BUN SERPL-MCNC: 10.8 MG/DL (ref 8–23)
CALCIUM SERPL-MCNC: 9.3 MG/DL (ref 8.8–10.2)
CHLORIDE SERPL-SCNC: 108 MMOL/L (ref 98–107)
CREAT SERPL-MCNC: 0.74 MG/DL (ref 0.67–1.17)
DEPRECATED HCO3 PLAS-SCNC: 27 MMOL/L (ref 22–29)
EGFRCR SERPLBLD CKD-EPI 2021: >90 ML/MIN/1.73M2
GLUCOSE SERPL-MCNC: 108 MG/DL (ref 70–99)
HBA1C MFR BLD: 6.4 % (ref 4.3–?)
POTASSIUM SERPL-SCNC: 3.8 MMOL/L (ref 3.4–5.3)
SODIUM SERPL-SCNC: 145 MMOL/L (ref 135–145)

## 2023-10-25 PROCEDURE — G0108 DIAB MANAGE TRN  PER INDIV: HCPCS | Performed by: DIETITIAN, REGISTERED

## 2023-10-25 PROCEDURE — 83036 HEMOGLOBIN GLYCOSYLATED A1C: CPT | Performed by: DIETITIAN, REGISTERED

## 2023-10-25 PROCEDURE — 80048 BASIC METABOLIC PNL TOTAL CA: CPT

## 2023-10-25 PROCEDURE — 36415 COLL VENOUS BLD VENIPUNCTURE: CPT

## 2023-10-25 ASSESSMENT — PAIN SCALES - GENERAL: PAINLEVEL: NO PAIN (0)

## 2023-10-25 NOTE — PROGRESS NOTES
Diabetes Self-Management Education & Support    Presents for: Individual review    Type of Service: In Person Visit    ASSESSMENT:  A1c today 6.4% which is down from previous A1c of 7.5% in July.  Addition of Jardiance along with patients lifestyle efforts have improved glucose levels.  Weight stable.  Eye exam and foot exam are up to date.  Copy of eye exam requested today.  BP high on two checks - long discussion with pt.  Pt is not symptomatic.  Declines urgent care.      Patient's most recent   Lab Results   Component Value Date    A1C 6.4 10/25/2023    HEMOGLOBINA1 7.5 07/18/2023     is meeting goal of  <7.5% for age.     Diabetes knowledge and skills assessment:   Patient is knowledgeable in diabetes management concepts related to: Healthy Eating, Being Active, Monitoring, Taking Medication, Problem Solving, and Healthy Coping    Continue education with the following diabetes management concepts: Reducing Risks-Reviewed BP goals, s/s of CVA, target BP levels.      Based on learning assessment above, most appropriate setting for further diabetes education would be: Individual setting.      PLAN  Continue exercise over the winter months.   Test glucose 1x/day - alternate times.  Check BP 2x/day and if >140/90 consistently need to see provider.   Try Relion over the counter meter/supplies if continue to have issues with One Touch.     Follow-up: 6 months    See Care Plan for co-developed, patient-state behavior change goals.  AVS provided for patient today.    Education Materials Provided:  One Touch Verio Flex meter    SUBJECTIVE/OBJECTIVE:  Presents for: Individual review  Accompanied by: Self  Diabetes education in the past 24mo: Yes  Focus of Visit: Assistance w/ making life changes  Diabetes type: Type 2  Date of diagnosis: 2008  Disease course: Improving  How confident are you filling out medical forms by yourself:: Extremely  Diabetes management related comments/concerns: Meter battery keeps  "dying.  Transportation concerns: No  Difficulty affording diabetes medication?: No  Difficulty affording diabetes testing supplies?: No  Other concerns:: None  Cultural Influences/Ethnic Background:  Not  or     Diabetes Symptoms & Complications:  Fatigue: No  Neuropathy: No  Polydipsia: No  Polyphagia: No  Polyuria: No  Visual change: No  Slow healing wounds: No  Symptom course: Stable  Weight trend: Stable  Complications assessed today?: Yes  CVA: No  Heart disease: No  Nephropathy: No  Retinopathy: No    Patient Problem List and Family Medical History reviewed for relevant medical history, current medical status, and diabetes risk factors.    Vitals:  /96   Pulse 83   Resp 16   Wt 112.3 kg (247 lb 9.6 oz)   SpO2 96%   BMI 36.04 kg/m    Estimated body mass index is 36.04 kg/m  as calculated from the following:    Height as of 7/18/23: 1.765 m (5' 9.5\").    Weight as of this encounter: 112.3 kg (247 lb 9.6 oz).   Last 3 BP:   BP Readings from Last 3 Encounters:   10/25/23 164/96   07/18/23 98/66   07/06/23 (!) 146/80       History   Smoking Status    Former    Years: 25.00    Types: Cigarettes    Quit date: 1991   Smokeless Tobacco    Never       Labs:  Lab Results   Component Value Date    A1C 7.0 06/22/2022    HEMOGLOBINA1 7.5 07/18/2023     Lab Results   Component Value Date     06/16/2021     Lab Results   Component Value Date    LDL 99 12/16/2019     HDL Cholesterol   Date Value Ref Range Status   12/16/2019 30 (L) >=60 mg/dL Final   ]  GFR Estimate   Date Value Ref Range Status   06/16/2021 90 >60 mL/min/[1.73_m2] Final     Comment:     Non  GFR Calc  Starting 12/18/2018, serum creatinine based estimated GFR (eGFR) will be   calculated using the Chronic Kidney Disease Epidemiology Collaboration   (CKD-EPI) equation.       GFR Estimate If Black   Date Value Ref Range Status   06/16/2021 >90 >60 mL/min/[1.73_m2] Final     Comment:      GFR " "Calc  Starting 12/18/2018, serum creatinine based estimated GFR (eGFR) will be   calculated using the Chronic Kidney Disease Epidemiology Collaboration   (CKD-EPI) equation.       Lab Results   Component Value Date    CR 0.81 06/16/2021     No results found for: \"MICROALBUMIN\"    Healthy Eating:  Healthy Eating Assessed Today: Yes  Cultural/Lutheran diet restrictions?: No  Meal planning/habits: Avoiding sweets  How many times a week on average do you eat food made away from home (restaurant/take-out)?: 0  Meals include: Breakfast, Lunch, Dinner  Breakfast: banana and milk  Lunch: sandwich  Dinner: beef roast, mashed potatoes with gravy, green beans  Snacks: occasional sugar free cookies (1-2) or sugar free candy  Beverages: Water, Coffee, Diet soda, Other (Crystal Lite)  Has patient met with a dietitian in the past?: Yes    Being Active:  Being Active Assessed Today: Yes  Exercise:: Yes (Has been riding E-Bike.  Has treadmill and may join fitness facility in winter months.)  Barrier to exercise: None    Monitoring:  Monitoring Assessed Today: Yes  Did patient bring glucose meter to appointment? : Yes  Blood Glucose Meter: One Touch (Verio)  Times checking blood sugar at home (number): 1  Times checking blood sugar at home (per): Day  Blood glucose trend: Decreasing  [id number]    Taking Medications:  Diabetes Medication(s)       Sodium-Glucose Co-Transporter 2 (SGLT2) Inhibitors       empagliflozin (JARDIANCE) 25 MG TABS tablet    Take 1 tablet (25 mg) by mouth daily      Incretin Mimetic Agents       Dulaglutide (TRULICITY) 3 MG/0.5ML SOPN    Inject 3 mg Subcutaneous once a week            Taking Medication Assessed Today: Yes  Current Treatments: Non-insulin Injectables, Oral Medication (taken by mouth) (Trulicity 3 mg weekly, Jardiance 25 mg am)  Problems taking diabetes medications regularly?: No  Diabetes medication side effects?: Yes (Did not tolerate Metformin r/t gi side effects.  Pt has been having " some intermittent diarrhea since increasing Trulicity to 3 mg dose but is able to control by eating more fiber.)    Problem Solving:  Problem Solving Assessed Today: Yes  Is the patient at risk for hypoglycemia?: No  Hypoglycemia Frequency: Never  Is the patient at risk for DKA?: No    Reducing Risks:  Reducing Risks Assessed Today: Yes  Diabetes Risks: Age over 45 years  CAD Risks: Diabetes Mellitus  Has dilated eye exam at least once a year?: Yes  Sees dentist every 6 months?: No  Feet checked by healthcare provider in the last year?: Yes    Healthy Coping:  Healthy Coping Assessed Today: Yes  Emotional response to diabetes: Acceptance  Informal Support system:: Spouse  Stage of change: MAINTENANCE (Working to maintain change, with risk of relapse)  Patient Activation Measure Survey Score:       No data to display                Time Spent: 40 minutes  Encounter Type: Individual    Any diabetes medication dose changes were made via the CDE Protocol per the patient's referring provider. A copy of this encounter was shared with the provider.

## 2023-10-25 NOTE — TELEPHONE ENCOUNTER
Pt was here today for diabetes visit.  Needed refill of Jardiance.  Labs done for your review.  Please sign pended order.   Thanks!

## 2023-10-25 NOTE — PATIENT INSTRUCTIONS
-Make sure you continue some form of exercise over the winter months.  -Good times to test your glucose are fasting or 2 hours after a meal.  -Target levels are fasting , 2 hours after a meal < 180.  -[employer] meter that can be purchased over the counter in [employer].  -Check BP more often - target is <140/90.  If consistently high then need to see Dr. London.  -Keep taking current diabetes medications.    -A1c today was 6.4%.  Great job!  Down from 7.5% at last check in July.  -Follow up in six month.   -Call sooner with concerns - PATRIIZA Short, [facility] [phone].

## 2023-10-25 NOTE — LETTER
10/25/2023        RE: Donis Watt  [address]  [place] [id number]        Diabetes Self-Management Education & Support    Presents for: Individual review    Type of Service: In Person Visit    ASSESSMENT:  A1c today 6.4% which is down from previous A1c of 7.5% in July.  Addition of Jardiance along with patients lifestyle efforts have improved glucose levels.  Weight stable.  Eye exam and foot exam are up to date.  Copy of eye exam requested today.  BP high on two checks - long discussion with pt.  Pt is not symptomatic.  Declines urgent care.      Patient's most recent   Lab Results   Component Value Date    A1C 6.4 10/25/2023    HEMOGLOBINA1 7.5 07/18/2023     is meeting goal of  <7.5% for age.     Diabetes knowledge and skills assessment:   Patient is knowledgeable in diabetes management concepts related to: Healthy Eating, Being Active, Monitoring, Taking Medication, Problem Solving, and Healthy Coping    Continue education with the following diabetes management concepts: Reducing Risks-Reviewed BP goals, s/s of CVA, target BP levels.      Based on learning assessment above, most appropriate setting for further diabetes education would be: Individual setting.      PLAN  Continue exercise over the winter months.   Test glucose 1x/day - alternate times.  Check BP 2x/day and if >140/90 consistently need to see provider.   Try [employer] over the counter meter/supplies if continue to have issues with One Touch.     Follow-up: 6 months    See Care Plan for co-developed, patient-state behavior change goals.  AVS provided for patient today.    Education Materials Provided:  One Touch Verio Flex meter    SUBJECTIVE/OBJECTIVE:  Presents for: Individual review  Accompanied by: Self  Diabetes education in the past 24mo: Yes  Focus of Visit: Assistance w/ making life changes  Diabetes type: Type 2  Date of diagnosis: 2008  Disease course: Improving  How confident are you filling out medical forms by  "yourself:: Extremely  Diabetes management related comments/concerns: Meter battery keeps dying.  Transportation concerns: No  Difficulty affording diabetes medication?: No  Difficulty affording diabetes testing supplies?: No  Other concerns:: None  Cultural Influences/Ethnic Background:  Not  or     Diabetes Symptoms & Complications:  Fatigue: No  Neuropathy: No  Polydipsia: No  Polyphagia: No  Polyuria: No  Visual change: No  Slow healing wounds: No  Symptom course: Stable  Weight trend: Stable  Complications assessed today?: Yes  CVA: No  Heart disease: No  Nephropathy: No  Retinopathy: No    Patient Problem List and Family Medical History reviewed for relevant medical history, current medical status, and diabetes risk factors.    Vitals:  /96   Pulse 83   Resp 16   Wt 112.3 kg (247 lb 9.6 oz)   SpO2 96%   BMI 36.04 kg/m    Estimated body mass index is 36.04 kg/m  as calculated from the following:    Height as of 7/18/23: 1.765 m (5' 9.5\").    Weight as of this encounter: 112.3 kg (247 lb 9.6 oz).   Last 3 BP:   BP Readings from Last 3 Encounters:   10/25/23 164/96   07/18/23 98/66   07/06/23 (!) 146/80       History   Smoking Status     Former     Years: 25.00     Types: Cigarettes     Quit date: 1991   Smokeless Tobacco     Never       Labs:  Lab Results   Component Value Date    A1C 7.0 06/22/2022    HEMOGLOBINA1 7.5 07/18/2023     Lab Results   Component Value Date     06/16/2021     Lab Results   Component Value Date    LDL 99 12/16/2019     HDL Cholesterol   Date Value Ref Range Status   12/16/2019 30 (L) >=60 mg/dL Final   ]  GFR Estimate   Date Value Ref Range Status   06/16/2021 90 >60 mL/min/[1.73_m2] Final     Comment:     Non  GFR Calc  Starting 12/18/2018, serum creatinine based estimated GFR (eGFR) will be   calculated using the Chronic Kidney Disease Epidemiology Collaboration   (CKD-EPI) equation.       GFR Estimate If Black   Date Value Ref Range " "Status   06/16/2021 >90 >60 mL/min/[1.73_m2] Final     Comment:      GFR Calc  Starting 12/18/2018, serum creatinine based estimated GFR (eGFR) will be   calculated using the Chronic Kidney Disease Epidemiology Collaboration   (CKD-EPI) equation.       Lab Results   Component Value Date    CR 0.81 06/16/2021     No results found for: \"MICROALBUMIN\"    Healthy Eating:  Healthy Eating Assessed Today: Yes  Cultural/Restorationist diet restrictions?: No  Meal planning/habits: Avoiding sweets  How many times a week on average do you eat food made away from home (restaurant/take-out)?: 0  Meals include: Breakfast, Lunch, Dinner  Breakfast: banana and milk  Lunch: sandwich  Dinner: beef roast, mashed potatoes with gravy, green beans  Snacks: occasional sugar free cookies (1-2) or sugar free candy  Beverages: Water, Coffee, Diet soda, Other (Crystal Lite)  Has patient met with a dietitian in the past?: Yes    Being Active:  Being Active Assessed Today: Yes  Exercise:: Yes (Has been riding E-Bike.  Has treadmill and may join fitness facility in winter months.)  Barrier to exercise: None    Monitoring:  Monitoring Assessed Today: Yes  Did patient bring glucose meter to appointment? : Yes  Blood Glucose Meter: One Touch (Verio)  Times checking blood sugar at home (number): 1  Times checking blood sugar at home (per): Day  Blood glucose trend: Decreasing  [id number]    Taking Medications:  Diabetes Medication(s)       Sodium-Glucose Co-Transporter 2 (SGLT2) Inhibitors       empagliflozin (JARDIANCE) 25 MG TABS tablet    Take 1 tablet (25 mg) by mouth daily      Incretin Mimetic Agents       Dulaglutide (TRULICITY) 3 MG/0.5ML SOPN    Inject 3 mg Subcutaneous once a week            Taking Medication Assessed Today: Yes  Current Treatments: Non-insulin Injectables, Oral Medication (taken by mouth) (Trulicity 3 mg weekly, Jardiance 25 mg am)  Problems taking diabetes medications regularly?: No  Diabetes medication " side effects?: Yes (Did not tolerate Metformin r/t gi side effects.  Pt has been having some intermittent diarrhea since increasing Trulicity to 3 mg dose but is able to control by eating more fiber.)    Problem Solving:  Problem Solving Assessed Today: Yes  Is the patient at risk for hypoglycemia?: No  Hypoglycemia Frequency: Never  Is the patient at risk for DKA?: No    Reducing Risks:  Reducing Risks Assessed Today: Yes  Diabetes Risks: Age over 45 years  CAD Risks: Diabetes Mellitus  Has dilated eye exam at least once a year?: Yes  Sees dentist every 6 months?: No  Feet checked by healthcare provider in the last year?: Yes    Healthy Coping:  Healthy Coping Assessed Today: Yes  Emotional response to diabetes: Acceptance  Informal Support system:: Spouse  Stage of change: MAINTENANCE (Working to maintain change, with risk of relapse)  Patient Activation Measure Survey Score:       No data to display                Time Spent: 40 minutes  Encounter Type: Individual    Any diabetes medication dose changes were made via the CDE Protocol per the patient's referring provider. A copy of this encounter was shared with the provider.       Sincerely,        Ria Morillo RD

## 2023-11-01 ENCOUNTER — APPOINTMENT (OUTPATIENT)
Dept: GENERAL RADIOLOGY | Facility: HOSPITAL | Age: 73
End: 2023-11-01
Attending: NURSE PRACTITIONER
Payer: COMMERCIAL

## 2023-11-01 ENCOUNTER — HOSPITAL ENCOUNTER (EMERGENCY)
Facility: HOSPITAL | Age: 73
Discharge: HOME | End: 2023-11-01
Attending: NURSE PRACTITIONER | Admitting: NURSE PRACTITIONER
Payer: COMMERCIAL

## 2023-11-01 VITALS
TEMPERATURE: 97.8 F | HEART RATE: 74 BPM | HEIGHT: 71 IN | BODY MASS INDEX: 35 KG/M2 | SYSTOLIC BLOOD PRESSURE: 180 MMHG | RESPIRATION RATE: 16 BRPM | DIASTOLIC BLOOD PRESSURE: 98 MMHG | WEIGHT: 250 LBS | OXYGEN SATURATION: 97 %

## 2023-11-01 DIAGNOSIS — I10 HYPERTENSION: ICD-10-CM

## 2023-11-01 LAB
ALBUMIN SERPL BCG-MCNC: 4.1 G/DL (ref 3.5–5.2)
ALP SERPL-CCNC: 82 U/L (ref 40–129)
ALT SERPL W P-5'-P-CCNC: 19 U/L (ref 0–70)
ANION GAP SERPL CALCULATED.3IONS-SCNC: 10 MMOL/L (ref 7–15)
AST SERPL W P-5'-P-CCNC: 22 U/L (ref 0–45)
BASOPHILS # BLD AUTO: 0.1 10E3/UL (ref 0–0.2)
BASOPHILS NFR BLD AUTO: 1 %
BILIRUB SERPL-MCNC: 0.4 MG/DL
BUN SERPL-MCNC: 13.4 MG/DL (ref 8–23)
CALCIUM SERPL-MCNC: 8.7 MG/DL (ref 8.8–10.2)
CHLORIDE SERPL-SCNC: 108 MMOL/L (ref 98–107)
CREAT SERPL-MCNC: 0.74 MG/DL (ref 0.67–1.17)
DEPRECATED HCO3 PLAS-SCNC: 26 MMOL/L (ref 22–29)
EGFRCR SERPLBLD CKD-EPI 2021: >90 ML/MIN/1.73M2
EOSINOPHIL # BLD AUTO: 0.1 10E3/UL (ref 0–0.7)
EOSINOPHIL NFR BLD AUTO: 2 %
ERYTHROCYTE [DISTWIDTH] IN BLOOD BY AUTOMATED COUNT: 14.4 % (ref 10–15)
GLUCOSE SERPL-MCNC: 102 MG/DL (ref 70–99)
HCT VFR BLD AUTO: 43.5 % (ref 40–53)
HGB BLD-MCNC: 14.4 G/DL (ref 13.3–17.7)
HOLD SPECIMEN: NORMAL
IMM GRANULOCYTES # BLD: 0 10E3/UL
IMM GRANULOCYTES NFR BLD: 0 %
LYMPHOCYTES # BLD AUTO: 1.2 10E3/UL (ref 0.8–5.3)
LYMPHOCYTES NFR BLD AUTO: 22 %
MAGNESIUM SERPL-MCNC: 2.3 MG/DL (ref 1.7–2.3)
MCH RBC QN AUTO: 28.2 PG (ref 26.5–33)
MCHC RBC AUTO-ENTMCNC: 33.1 G/DL (ref 31.5–36.5)
MCV RBC AUTO: 85 FL (ref 78–100)
MONOCYTES # BLD AUTO: 0.6 10E3/UL (ref 0–1.3)
MONOCYTES NFR BLD AUTO: 11 %
NEUTROPHILS # BLD AUTO: 3.6 10E3/UL (ref 1.6–8.3)
NEUTROPHILS NFR BLD AUTO: 64 %
NRBC # BLD AUTO: 0 10E3/UL
NRBC BLD AUTO-RTO: 0 /100
PLATELET # BLD AUTO: 155 10E3/UL (ref 150–450)
POTASSIUM SERPL-SCNC: 3.5 MMOL/L (ref 3.4–5.3)
PROT SERPL-MCNC: 6.5 G/DL (ref 6.4–8.3)
RBC # BLD AUTO: 5.11 10E6/UL (ref 4.4–5.9)
SODIUM SERPL-SCNC: 144 MMOL/L (ref 135–145)
TROPONIN T SERPL HS-MCNC: 7 NG/L
TSH SERPL DL<=0.005 MIU/L-ACNC: 1.67 UIU/ML (ref 0.3–4.2)
WBC # BLD AUTO: 5.6 10E3/UL (ref 4–11)

## 2023-11-01 PROCEDURE — 99285 EMERGENCY DEPT VISIT HI MDM: CPT | Mod: 25

## 2023-11-01 PROCEDURE — 99284 EMERGENCY DEPT VISIT MOD MDM: CPT | Performed by: NURSE PRACTITIONER

## 2023-11-01 PROCEDURE — 80053 COMPREHEN METABOLIC PANEL: CPT | Performed by: NURSE PRACTITIONER

## 2023-11-01 PROCEDURE — 250N000013 HC RX MED GY IP 250 OP 250 PS 637: Performed by: NURSE PRACTITIONER

## 2023-11-01 PROCEDURE — 93005 ELECTROCARDIOGRAM TRACING: CPT

## 2023-11-01 PROCEDURE — 36415 COLL VENOUS BLD VENIPUNCTURE: CPT | Performed by: NURSE PRACTITIONER

## 2023-11-01 PROCEDURE — 84484 ASSAY OF TROPONIN QUANT: CPT | Performed by: NURSE PRACTITIONER

## 2023-11-01 PROCEDURE — 84443 ASSAY THYROID STIM HORMONE: CPT | Performed by: NURSE PRACTITIONER

## 2023-11-01 PROCEDURE — 71046 X-RAY EXAM CHEST 2 VIEWS: CPT

## 2023-11-01 PROCEDURE — 93010 ELECTROCARDIOGRAM REPORT: CPT | Performed by: INTERNAL MEDICINE

## 2023-11-01 PROCEDURE — 85025 COMPLETE CBC W/AUTO DIFF WBC: CPT | Performed by: NURSE PRACTITIONER

## 2023-11-01 PROCEDURE — 83735 ASSAY OF MAGNESIUM: CPT | Performed by: NURSE PRACTITIONER

## 2023-11-01 RX ORDER — ENALAPRIL MALEATE 10 MG/1
10 TABLET ORAL 2 TIMES DAILY
Qty: 30 TABLET | Refills: 0 | Status: SHIPPED | OUTPATIENT
Start: 2023-11-01

## 2023-11-01 RX ORDER — ENALAPRIL MALEATE 10 MG/1
10 TABLET ORAL ONCE
Status: COMPLETED | OUTPATIENT
Start: 2023-11-01 | End: 2023-11-01

## 2023-11-01 RX ORDER — ENALAPRIL MALEATE 10 MG/1
10 TABLET ORAL DAILY
COMMUNITY
Start: 2023-10-20 | End: 2023-11-01

## 2023-11-01 RX ADMIN — ENALAPRIL MALEATE 10 MG: 10 TABLET ORAL at 13:04

## 2023-11-01 ASSESSMENT — ACTIVITIES OF DAILY LIVING (ADL): ADLS_ACUITY_SCORE: 35

## 2023-11-01 ASSESSMENT — ENCOUNTER SYMPTOMS
RESPIRATORY NEGATIVE: 1
HEADACHES: 1
ALLERGIC/IMMUNOLOGIC NEGATIVE: 1
LIGHT-HEADEDNESS: 0
CONSTITUTIONAL NEGATIVE: 1
NUMBNESS: 0
PSYCHIATRIC NEGATIVE: 1
ENDOCRINE NEGATIVE: 1
CARDIOVASCULAR NEGATIVE: 1
MUSCULOSKELETAL NEGATIVE: 1
SEIZURES: 0
WEAKNESS: 0
HEMATOLOGIC/LYMPHATIC NEGATIVE: 1
DIZZINESS: 0
GASTROINTESTINAL NEGATIVE: 1
EYES NEGATIVE: 1

## 2023-11-01 NOTE — DISCHARGE INSTRUCTIONS
Check your blood pressure tonight around 8 PM.  If it remains elevated take the p.m. dose of enalapril 10 mg.  Thereafter take it twice daily.      Pain control:   If your past medical conditions, allergies, current medications, or current status does not prevent you from using acetaminophen and/or ibuprofen, use the following:   Acetaminophen 650-1000 mg every 6 hours as needed for pain in addition to ibuprofen 400 mg every 6 hours as needed for pain.  Take these two medications together if wanted.    Remember that these are for AS NEEDED.  If not needed, do not take.        Follow-up with your primary care provider for reevaluation.  Contact your primary care provider if you have any questions or concerns.  Do not hesitate to return to the ER if any new or worsening symptoms.     Please read the attached instructions (if any).  They highlight more specific treatments and interventions for you at home.              Thank you for letting me participate in your care and wish you a fast and uneventful recovery,    Hernandez HENDERSON CNP    Do not hesitate to contact me with questions or concerns.  [email or web address]  [email or web address]

## 2023-11-01 NOTE — ED NOTES
Pt presents to ED with c/o HTN and headache this AM, pt states , headache that migrates around, started in posterior neck, moved to forehead, now on left side of head near temple, no CP, no SOB, sats 96% on RA, reports no cardiac history, some family cardiac hx, no resp problems, DM2, no issues with b/b, and denies pain beyond headache at this time.

## 2023-11-01 NOTE — ED PROVIDER NOTES
History     Chief Complaint   Patient presents with    Hypertension     HPI  Donis Watt is a 73 year old patient with history of hypertension, DMT2, BPH, comes in for elevated blood pressure and headache.  Patient states has had elevated blood pressure for the past week.  States has had a headache with this.  States that he called his PCPs office but has not received a call back so comes into the ER.  Patient states headache is moving around his head.  No dizziness, lightheadedness, visual disturbances, chest pain, shortness of breath, paresthesias, nausea reported.   is on enalapril for blood pressure.  Used to have hydrochlorothiazide in addition to the enalapril but this was discontinued about 1-2 months ago.    Allergies:  Allergies   Allergen Reactions    Flexeril [Cyclobenzaprine]     Codeine Anxiety     hyperactivity      No Clinical Screening - See Comments Anxiety     All Narcotics give him hyperactivity.        Problem List:    Patient Active Problem List    Diagnosis Date Noted    Achilles tendonitis 08/16/2023     Priority: Medium    Class 2 severe obesity due to excess calories with serious comorbidity in adult (H) 07/06/2023     Priority: Medium    Mixed hyperlipidemia 06/29/2021     Priority: Medium    Obesity 06/29/2021     Priority: Medium    Nocturia 06/29/2021     Priority: Medium    Vitamin D deficiency 06/29/2021     Priority: Medium    Essential hypertension 06/29/2021     Priority: Medium    Dry eye syndrome of both eyes 06/29/2021     Priority: Medium    Diarrhea 06/29/2021     Priority: Medium    Benign prostatic hyperplasia without lower urinary tract symptoms 06/29/2021     Priority: Medium    Age-related nuclear cataract of both eyes 06/29/2021     Priority: Medium    Tear film insufficiency, bilateral 06/29/2021     Priority: Medium    Anatomical narrow angle of both eyes 06/29/2021     Priority: Medium    SREE (obstructive sleep apnea) 06/29/2021     Priority: Medium     Insomnia 2021     Priority: Medium    Type 2 diabetes mellitus without complication, without long-term current use of insulin (H) 2019     Priority: Medium        Past Medical History:    No past medical history on file.    Past Surgical History:    Past Surgical History:   Procedure Laterality Date    APPENDECTOMY          CARPAL TUNNEL RELEASE RT/LT Left     COLONOSCOPY  2014    colonoscopy with Dr Horn, repeat in 5 years    ENT SURGERY      nasal polyps    EYE SURGERY      laser iridotomy    MOUTH SURGERY  2014    oral surgery    ORTHOPEDIC SURGERY Right 1995    rotator cuff repair    ORTHOPEDIC SURGERY Left     left shoulder rotator cuff repair x's 2    ORTHOPEDIC SURGERY Left     ulnar nerve surgery       Family History:    Family History   Problem Relation Age of Onset    Hypertension Mother     Cancer Mother     Cerebrovascular Disease Mother     CABG Father     Hypertension Sister     Cerebrovascular Disease Sister     Cerebrovascular Disease Sister        Social History:  Marital Status:   [2]  Social History     Tobacco Use    Smoking status: Former     Years: 25     Types: Cigarettes     Quit date:      Years since quittin.8    Smokeless tobacco: Never   Substance Use Topics    Alcohol use: Yes     Comment: social    Drug use: No        Medications:    ascorbic acid 1000 MG TABS tablet  blood glucose (ACCU-CHEK GUIDE) test strip  blood glucose monitoring (SOFTCLIX) lancets  Carboxymethylcellulose Sodium (REFRESH TEARS OP)  Dulaglutide (TRULICITY) 3 MG/0.5ML SOPN  empagliflozin (JARDIANCE) 25 MG TABS tablet  enalapril (VASOTEC) 10 MG tablet  finasteride (PROSCAR) 5 MG tablet  mirtazapine (REMERON) 15 MG tablet  Multiple Vitamin (MULTIVITAMIN ADULT PO)  PANTOPRAZOLE SODIUM PO  Probiotic Product (PROBIOTIC-10) CHEW  rosuvastatin (CRESTOR) 10 MG tablet  tamsulosin (FLOMAX) 0.4 MG capsule  VITAMIN D, CHOLECALCIFEROL, PO          Review of Systems  "  Constitutional: Negative.    HENT: Negative.     Eyes: Negative.    Respiratory: Negative.     Cardiovascular: Negative.    Gastrointestinal: Negative.    Endocrine: Negative.    Musculoskeletal: Negative.    Skin: Negative.    Allergic/Immunologic: Negative.    Neurological:  Positive for headaches. Negative for dizziness, seizures, syncope, weakness, light-headedness and numbness.   Hematological: Negative.    Psychiatric/Behavioral: Negative.         Physical Exam   BP: (!) 221/106  Pulse: 88  Temp: 97.9  F (36.6  C)  Resp: 16  Height: 180.3 cm (5' 11\")  Weight: 114.5 kg (252 lb 8.6 oz)  SpO2: 97 %      GENERAL APPEARANCE:  The patient is a 73 year old well-developed, well-nourished individual in no acute distress that appears as stated age.  NECK:  Supple.  Trachea is midline.  No carotid bruits present on auscultation.  LUNGS:  Breathing is easy.  Breath sounds are equal and clear bilaterally.  No wheezes, rhonchi, or rales.  HEART:  Regular rate and rhythm with normal S1 and S2.  No murmurs, gallops, or rubs.  ABDOMEN:  No abdominal bruits or thrills present upon auscultation/palpation.  EXTREMITIES:  No cyanosis, clubbing, or edema.    NEUROLOGIC:  No focal sensory or motor deficits are noted.    PSYCHIATRIC:  The patient is awake, alert, and oriented x4.  Recent and remote memory is intact.  Appropriate mood and affect.  Calm and cooperative with history and physical exam.  SKIN:  Warm, dry, and well perfused.  Good turgor.  No lesions, nodules, or rashes are noted.  No bruising noted.      Comment: Discrepancies between my note and notes on behalf of the nursing team or other care providers are secondary to my findings reflecting my physical examination and questioning of the patient.  Any conflicting information provided is not in line with my examination of the patient.       ED Course              ED Course as of 11/01/23 1424   Wed Nov 01, 2023   1123 Labs, ECG, chest x-ray ordered.   1123 In to see " patient and history/physical completed.    1134 EKG 12-lead, tracing only  No STEMI noted.   1246 Lab work is benign with high-sensitivity of troponin 7 making ACS unlikely as this has been going on 1 week.  Enalapril 10 mg p.o. extra ordered.   1412 Blood pressure has come down to 180/90 after enalapril.  Will likely continue to improve as medication comes to peak effect.  With patient having known hypertension even at last diabetic visit, we will discharge patient home.  Will increase patient's enalapril to 10 mg twice a day in the interim.  Patient to contact PCP office and get as soon as possible appointment.  In regards to headache we will have patient do acetaminophen/ibuprofen.  Return if worsening.  Patient in agreement            ECG:    ECG competed at 1131 and personally reviewed at 1134 showing sinus rhythm with ventricular rate of 80 and QTc of 500.  Right bundle branch block is present.  Left anterior hemiblock is present.  Inferior/anterior infarct age-indeterminate is noted.  T wave abnormalities in anteroseptal leads present.  Abnormal ECG.  When compared to ECG from 6/13/2020, patient no longer has bigeminal PACs.  Possible anterior infarct age indeterminant is now noted along with T wave abnormalities in the anteroseptal leads.  Patient now has left anterior hemiblock present.         Results for orders placed or performed during the hospital encounter of 11/01/23 (from the past 24 hour(s))   EKG 12-lead, tracing only   Result Value Ref Range    Systolic Blood Pressure  mmHg    Diastolic Blood Pressure  mmHg    Ventricular Rate 80 BPM    Atrial Rate 80 BPM    IA Interval 198 ms    QRS Duration 132 ms     ms    QTc 500 ms    P Axis 61 degrees    R AXIS -60 degrees    T Axis -13 degrees    Interpretation ECG       Sinus rhythm  Left axis deviation  Right bundle branch block  Inferior infarct , age undetermined  Cannot rule out Anterior infarct , age undetermined  Abnormal ECG  No previous ECGs  available     CBC with platelets differential    Narrative    The following orders were created for panel order CBC with platelets differential.  Procedure                               Abnormality         Status                     ---------                               -----------         ------                     CBC with platelets and d...[[id number]]                      Final result                 Please view results for these tests on the individual orders.   Comprehensive metabolic panel   Result Value Ref Range    Sodium 144 135 - 145 mmol/L    Potassium 3.5 3.4 - 5.3 mmol/L    Carbon Dioxide (CO2) 26 22 - 29 mmol/L    Anion Gap 10 7 - 15 mmol/L    Urea Nitrogen 13.4 8.0 - 23.0 mg/dL    Creatinine 0.74 0.67 - 1.17 mg/dL    GFR Estimate >90 >60 mL/min/1.73m2    Calcium 8.7 (L) 8.8 - 10.2 mg/dL    Chloride 108 (H) 98 - 107 mmol/L    Glucose 102 (H) 70 - 99 mg/dL    Alkaline Phosphatase 82 40 - 129 U/L    AST 22 0 - 45 U/L    ALT 19 0 - 70 U/L    Protein Total 6.5 6.4 - 8.3 g/dL    Albumin 4.1 3.5 - 5.2 g/dL    Bilirubin Total 0.4 <=1.2 mg/dL   Troponin T, High Sensitivity   Result Value Ref Range    Troponin T, High Sensitivity 7 <=22 ng/L   Magnesium   Result Value Ref Range    Magnesium 2.3 1.7 - 2.3 mg/dL   TSH with free T4 reflex   Result Value Ref Range    TSH 1.67 0.30 - 4.20 uIU/mL   CBC with platelets and differential   Result Value Ref Range    WBC Count 5.6 4.0 - 11.0 10e3/uL    RBC Count 5.11 4.40 - 5.90 10e6/uL    Hemoglobin 14.4 13.3 - 17.7 g/dL    Hematocrit 43.5 40.0 - 53.0 %    MCV 85 78 - 100 fL    MCH 28.2 26.5 - 33.0 pg    MCHC 33.1 31.5 - 36.5 g/dL    RDW 14.4 10.0 - 15.0 %    Platelet Count 155 150 - 450 10e3/uL    % Neutrophils 64 %    % Lymphocytes 22 %    % Monocytes 11 %    % Eosinophils 2 %    % Basophils 1 %    % Immature Granulocytes 0 %    NRBCs per 100 WBC 0 <1 /100    Absolute Neutrophils 3.6 1.6 - 8.3 10e3/uL    Absolute Lymphocytes 1.2 0.8 - 5.3 10e3/uL    Absolute Monocytes  0.6 0.0 - 1.3 10e3/uL    Absolute Eosinophils 0.1 0.0 - 0.7 10e3/uL    Absolute Basophils 0.1 0.0 - 0.2 10e3/uL    Absolute Immature Granulocytes 0.0 <=0.4 10e3/uL    Absolute NRBCs 0.0 10e3/uL   Extra Tube    Narrative    The following orders were created for panel order Extra Tube.  Procedure                               Abnormality         Status                     ---------                               -----------         ------                     Extra Blue Top Tube[[id number]]                              Final result               Extra Red Top Tube[[id number]]                               Final result               Extra Heparinized Syringe[[id number]]                        Final result                 Please view results for these tests on the individual orders.   Extra Blue Top Tube   Result Value Ref Range    Hold Specimen JIC    Extra Red Top Tube   Result Value Ref Range    Hold Specimen JIC    Extra Heparinized Syringe   Result Value Ref Range    Hold Specimen JIC    XR Chest 2 Views    Narrative    Exam:  XR CHEST 2 VIEWS    HISTORY: Elevated blood pressure.    COMPARISON:  None.    FINDINGS:     The cardiomediastinal contours are normal.      No focal consolidation, effusion, or pneumothorax.      No acute osseous abnormality.       Impression    IMPRESSION:      No acute cardiopulmonary process.      MAGY SHELDON MD         SYSTEM ID:  [id number]       Medications   enalapril (VASOTEC) tablet 10 mg (10 mg Oral $Given 11/1/23 8160)       Assessments & Plan (with Medical Decision Making)     I have reviewed the nursing notes.    I have reviewed the findings, diagnosis, plan and need for follow up with the patient.      Summary:  Patient presents to the ER today for hypertension and headache.  Potential diagnosis which have been considered and evaluated include hypertensive emergency/urgency, migraine, MI/NSTEMI, arrhythmia, as well as others. Many of these have been excluded using the various  modalities and assessment as noted on the chart. At the present time, the diagnosis given seems to be the most likely hypertension.  Upon arrival, vitals signs show blood pressure 221/106 with a pulse of 88.  Temperature 36.6  C.  Respirations 16 oxygenation 97% on room air.  The patient is alert and oriented no distress.  Cardiac and respiratory examination normal.  Neurological examination normal.  ECG obtained showing no acute abnormalities with high sensitivity of troponin's being 7 making ACS unlikely as this has been going on a week.  WBC 5.6 with hemoglobin 14.4.  Electrolytes and renal/hepatic functions benign.  TSH normal at 1.67.  Chest x-ray personally reviewed showing no acute cardiopulmonary abnormalities.  Patient has complaints of headache and elevated blood pressure.  Patient has no endorgan damage from this elevated blood pressure.  Additional dose of enalapril 10 mg given to the patient with improvement of blood pressure to 180/90.  With these findings will discharge patient home to follow-up with PCP for adjustment of blood pressure medication.  Acetaminophen/ibuprofen for headache.  Return to ER if any new or worsening symptoms.  Patient verbalized understanding agrees with plan of care.  Patient discharged home.        Critical Care Time: None    Impression and plan discussed with patient. Questions answered, concerns addressed, indications for urgent re-evaluation reviewed, and  given. Patient/Parent/Caregiver agree with treatment plan and have no further questions at this time.  AVS provided at discharge.    This note was created by the Dragon Voice Dictation System. Inadvertent typographical errors, due to software recognition problems, may still exist.             Current Discharge Medication List          Final diagnoses:   Hypertension       11/1/2023   [facility]       Hernandez De Leon APRN CNP  11/01/23 1424

## 2023-11-07 LAB
ATRIAL RATE - MUSE: 80 BPM
DIASTOLIC BLOOD PRESSURE - MUSE: NORMAL MMHG
INTERPRETATION ECG - MUSE: NORMAL
P AXIS - MUSE: 61 DEGREES
PR INTERVAL - MUSE: 198 MS
QRS DURATION - MUSE: 132 MS
QT - MUSE: 434 MS
QTC - MUSE: 500 MS
R AXIS - MUSE: -60 DEGREES
SYSTOLIC BLOOD PRESSURE - MUSE: NORMAL MMHG
T AXIS - MUSE: -13 DEGREES
VENTRICULAR RATE- MUSE: 80 BPM

## 2024-01-31 ENCOUNTER — PATIENT OUTREACH (OUTPATIENT)
Dept: EDUCATION SERVICES | Facility: HOSPITAL | Age: 74
End: 2024-01-31

## 2024-01-31 NOTE — PROGRESS NOTES
Pt called stating that his pharmacy ([facility]) does not have Trulicity 3 mg dose.  They do have 4.5 mg dose.  Discussed with patient that option would be to increase to 4.5 mg dose or for him to call around to see if an alternate pharmacy has 3 gm dose available and if so we could order from there.  Pt plans to call around and will let me know.

## 2024-04-24 DIAGNOSIS — E11.9 TYPE 2 DIABETES MELLITUS WITHOUT COMPLICATION, WITHOUT LONG-TERM CURRENT USE OF INSULIN (H): ICD-10-CM

## 2024-04-24 DIAGNOSIS — E11.65 TYPE 2 DIABETES MELLITUS WITH HYPERGLYCEMIA, WITHOUT LONG-TERM CURRENT USE OF INSULIN (H): Primary | ICD-10-CM

## 2024-04-25 ENCOUNTER — HOSPITAL ENCOUNTER (OUTPATIENT)
Dept: EDUCATION SERVICES | Facility: HOSPITAL | Age: 74
Discharge: HOME | End: 2024-04-25
Attending: DIETITIAN, REGISTERED | Admitting: DIETITIAN, REGISTERED
Payer: COMMERCIAL

## 2024-04-25 VITALS
SYSTOLIC BLOOD PRESSURE: 117 MMHG | DIASTOLIC BLOOD PRESSURE: 67 MMHG | OXYGEN SATURATION: 95 % | HEIGHT: 70 IN | RESPIRATION RATE: 18 BRPM | WEIGHT: 246 LBS | HEART RATE: 71 BPM | BODY MASS INDEX: 35.22 KG/M2

## 2024-04-25 DIAGNOSIS — E11.65 TYPE 2 DIABETES MELLITUS WITH HYPERGLYCEMIA, WITHOUT LONG-TERM CURRENT USE OF INSULIN (H): ICD-10-CM

## 2024-04-25 DIAGNOSIS — E11.9 TYPE 2 DIABETES MELLITUS WITHOUT COMPLICATION, WITHOUT LONG-TERM CURRENT USE OF INSULIN (H): Primary | ICD-10-CM

## 2024-04-25 LAB — HBA1C MFR BLD: 6.8 % (ref 4.3–?)

## 2024-04-25 PROCEDURE — 83036 HEMOGLOBIN GLYCOSYLATED A1C: CPT | Performed by: DIETITIAN, REGISTERED

## 2024-04-25 PROCEDURE — G0108 DIAB MANAGE TRN  PER INDIV: HCPCS | Performed by: DIETITIAN, REGISTERED

## 2024-04-25 RX ORDER — AMLODIPINE BESYLATE 10 MG/1
TABLET ORAL
COMMUNITY
Start: 2024-03-26

## 2024-04-25 ASSESSMENT — PAIN SCALES - GENERAL: PAINLEVEL: MODERATE PAIN (4)

## 2024-04-25 NOTE — PATIENT INSTRUCTIONS
-Continue to work on healthy food choices.    -Keep riding your bike for exercise.  -Continue to test your glucose 1x/day - good times are fasting or 2 hours after a meal.  -Target glucose levels are fasting , 2 hours after a meal < 180.  -Continue current diabetes medications.    -A1c today was 6.8%.  Goal is < 7.5%, < 7.0% is better.   -Follow up in six months.   -Call with any concerns - PATRIZIA Short, [facility] [phone].

## 2024-04-25 NOTE — LETTER
4/25/2024        RE: Donis Watt  [address]  [place] [address]        Diabetes Self-Management Education & Support    Presents for: Individual review    Type of Service: In Person Visit    ASSESSMENT:  A1c today in target at 6.8%.  Pt has no concerns regarding his diabetes today.  He has been taking his glucose lowering medications consistently.  BP improved with change in his BP medications by provider.  Pt checks 2x/day at home.  Weight stable.  Eye exam and foot exam are up to date.      Patient's most recent   Lab Results   Component Value Date    A1C 7.0 06/22/2022    HEMOGLOBINA1 6.8 04/25/2024     is meeting goal of  <7.5% for age.    Diabetes knowledge and skills assessment:   Patient is knowledgeable in diabetes management concepts related to: Healthy Eating, Being Active, Monitoring, and Taking Medication    Will continue to educate on diabetes management concepts as indicated.     Based on learning assessment above, most appropriate setting for further diabetes education would be: Individual setting.      PLAN  Continue to work on healthy food choices.   Continue riding E Bike for exercise - increase frequency as able.   Test glucose 1x/day at alternating times.    Continue current diabetes medications.     Follow-up: Six months.     See Care Plan for co-developed, patient-state behavior change goals.  AVS provided for patient today.    Education Materials Provided:  No new materials today.    SUBJECTIVE/OBJECTIVE:  Presents for: Individual review  Accompanied by: Self  Diabetes education in the past 24mo: Yes  Focus of Visit: Assistance w/ making life changes  Diabetes type: Type 2  Date of diagnosis: 2008  Disease course: Stable  How confident are you filling out medical forms by yourself:: Quite a bit  Diabetes management related comments/concerns: None  Transportation concerns: No  Difficulty affording diabetes medication?: No  Difficulty affording diabetes testing supplies?:  "No  Other concerns:: None  Cultural Influences/Ethnic Background:  Not  or     Diabetes Symptoms & Complications:  Diabetes Related Symptoms: None  Weight trend: Stable  Symptom course: Stable  Disease course: Stable  Complications assessed today?: Yes  CVA: No  Heart disease: No  Nephropathy: No  Retinopathy: No    Patient Problem List and Family Medical History reviewed for relevant medical history, current medical status, and diabetes risk factors.    Vitals:  /67 (BP Location: Left arm, Cuff Size: Adult Large)   Pulse 71   Resp 18   Ht 1.778 m (5' 10\")   Wt 111.6 kg (246 lb)   SpO2 95%   BMI 35.30 kg/m    Estimated body mass index is 35.3 kg/m  as calculated from the following:    Height as of this encounter: 1.778 m (5' 10\").    Weight as of this encounter: 111.6 kg (246 lb).   Last 3 BP:   BP Readings from Last 3 Encounters:   04/25/24 117/67   11/01/23 180/98   10/25/23 164/96       History   Smoking Status     Former     Years: 25.00     Types: Cigarettes     Quit date: 1991   Smokeless Tobacco     Never       Labs:  Lab Results   Component Value Date    A1C 7.0 06/22/2022    HEMOGLOBINA1 6.8 04/25/2024     Lab Results   Component Value Date     11/01/2023     06/16/2021     Lab Results   Component Value Date    LDL 99 12/16/2019     HDL Cholesterol   Date Value Ref Range Status   12/16/2019 30 (L) >=60 mg/dL Final   ]  GFR Estimate   Date Value Ref Range Status   11/01/2023 >90 >60 mL/min/1.73m2 Final   06/16/2021 90 >60 mL/min/[1.73_m2] Final     Comment:     Non  GFR Calc  Starting 12/18/2018, serum creatinine based estimated GFR (eGFR) will be   calculated using the Chronic Kidney Disease Epidemiology Collaboration   (CKD-EPI) equation.       GFR Estimate If Black   Date Value Ref Range Status   06/16/2021 >90 >60 mL/min/[1.73_m2] Final     Comment:      GFR Calc  Starting 12/18/2018, serum creatinine based estimated GFR (eGFR) will " "be   calculated using the Chronic Kidney Disease Epidemiology Collaboration   (CKD-EPI) equation.       Lab Results   Component Value Date    CR 0.74 11/01/2023    CR 0.81 06/16/2021     No results found for: \"MICROALBUMIN\"    Healthy Eating:  Healthy Eating Assessed Today: Yes  Cultural/Restorationist diet restrictions?: No  Do you have any food allergies or intolerances?: No  Meal planning/habits: Low carb  Who cooks/prepares meals for you?: Spouse  Who purchases food in  your home?: Self, Spouse  How many times a week on average do you eat food made away from home (restaurant/take-out)?: 0  Meals include: Breakfast, Lunch, Dinner  Breakfast: Kenan Jose breakfast sandwich and banana  Lunch: grilled cheese sandwich  Dinner: salmon, potatoes, green beans  Snacks: sugar free cookie, nuts, popsicle  Beverages: Diet soda, Milk, Water, Coffee  Has patient met with a dietitian in the past?: Yes    Being Active:  Being Active Assessed Today: Yes  Exercise:: Yes (Just started riding his E Bike again.)  Days per week of moderate to strenuous exercise (like a brisk walk): 2  On average, minutes per day of exercise at this level: 60  How intense was your typical exercise? : Light (like stretching or slow walking)  Exercise Minutes per Week: 120  Barrier to exercise: Physical limitation (Recently hurt his thumb/wrist area - seeing provider on Monday.)    Monitoring:  Monitoring Assessed Today: Yes  Did patient bring glucose meter to appointment? : No (Pt brought written record of glucose levels.)  Blood Glucose Meter: Accu-chek (Guide)  Times checking blood sugar at home (number): 1  Times checking blood sugar at home (per): Day  Blood glucose trend: No change  Fasting-     Taking Medications:  Diabetes Medication(s)       Sodium-Glucose Co-Transporter 2 (SGLT2) Inhibitors       empagliflozin (JARDIANCE) 25 MG TABS tablet Take 1 tablet (25 mg) by mouth daily       Incretin Mimetic Agents       Dulaglutide (TRULICITY) 3 " MG/0.5ML SOPN Inject 3 mg Subcutaneous once a week            Taking Medication Assessed Today: Yes  Current Treatments: Oral Medication (taken by mouth), Non-insulin Injectables (Trulicity 3 mg weekly, Jardiance 25 mg daily)  Problems taking diabetes medications regularly?: No  Diabetes medication side effects?: No    Problem Solving:  Problem Solving Assessed Today: Yes  Is the patient at risk for hypoglycemia?: No  Is the patient at risk for DKA?: No    Reducing Risks:  Reducing Risks Assessed Today: Yes  Diabetes Risks: Age over 45 years  CAD Risks: Diabetes Mellitus, Male sex, Obesity  Has dilated eye exam at least once a year?: Yes  Sees dentist every 6 months?: Yes  Feet checked by healthcare provider in the last year?: Yes    Healthy Coping:  Healthy Coping Assessed Today: Yes  Emotional response to diabetes: Acceptance, Concern for health and well-being  Informal Support system:: Spouse, Family  Stage of change: MAINTENANCE (Working to maintain change, with risk of relapse)  Patient Activation Measure Survey Score:       No data to display                Time Spent: 30 minutes  Encounter Type: Individual    Any diabetes medication dose changes were made via the CDE Protocol per the patient's referring provider. A copy of this encounter was shared with the provider.       Sincerely,        iRa Morillo RD

## 2024-04-25 NOTE — PROGRESS NOTES
Diabetes Self-Management Education & Support    Presents for: Individual review    Type of Service: In Person Visit    ASSESSMENT:  A1c today in target at 6.8%.  Pt has no concerns regarding his diabetes today.  He has been taking his glucose lowering medications consistently.  BP improved with change in his BP medications by provider.  Pt checks 2x/day at home.  Weight stable.  Eye exam and foot exam are up to date.      Patient's most recent   Lab Results   Component Value Date    A1C 7.0 06/22/2022    HEMOGLOBINA1 6.8 04/25/2024     is meeting goal of  <7.5% for age.    Diabetes knowledge and skills assessment:   Patient is knowledgeable in diabetes management concepts related to: Healthy Eating, Being Active, Monitoring, and Taking Medication    Will continue to educate on diabetes management concepts as indicated.     Based on learning assessment above, most appropriate setting for further diabetes education would be: Individual setting.      PLAN  Continue to work on healthy food choices.   Continue riding E Bike for exercise - increase frequency as able.   Test glucose 1x/day at alternating times.    Continue current diabetes medications.     Follow-up: Six months.     See Care Plan for co-developed, patient-state behavior change goals.  AVS provided for patient today.    Education Materials Provided:  No new materials today.    SUBJECTIVE/OBJECTIVE:  Presents for: Individual review  Accompanied by: Self  Diabetes education in the past 24mo: Yes  Focus of Visit: Assistance w/ making life changes  Diabetes type: Type 2  Date of diagnosis: 2008  Disease course: Stable  How confident are you filling out medical forms by yourself:: Quite a bit  Diabetes management related comments/concerns: None  Transportation concerns: No  Difficulty affording diabetes medication?: No  Difficulty affording diabetes testing supplies?: No  Other concerns:: None  Cultural Influences/Ethnic Background:  Not  or  "    Diabetes Symptoms & Complications:  Diabetes Related Symptoms: None  Weight trend: Stable  Symptom course: Stable  Disease course: Stable  Complications assessed today?: Yes  CVA: No  Heart disease: No  Nephropathy: No  Retinopathy: No    Patient Problem List and Family Medical History reviewed for relevant medical history, current medical status, and diabetes risk factors.    Vitals:  /67 (BP Location: Left arm, Cuff Size: Adult Large)   Pulse 71   Resp 18   Ht 1.778 m (5' 10\")   Wt 111.6 kg (246 lb)   SpO2 95%   BMI 35.30 kg/m    Estimated body mass index is 35.3 kg/m  as calculated from the following:    Height as of this encounter: 1.778 m (5' 10\").    Weight as of this encounter: 111.6 kg (246 lb).   Last 3 BP:   BP Readings from Last 3 Encounters:   04/25/24 117/67   11/01/23 180/98   10/25/23 164/96       History   Smoking Status    Former    Years: 25.00    Types: Cigarettes    Quit date: 1991   Smokeless Tobacco    Never       Labs:  Lab Results   Component Value Date    A1C 7.0 06/22/2022    HEMOGLOBINA1 6.8 04/25/2024     Lab Results   Component Value Date     11/01/2023     06/16/2021     Lab Results   Component Value Date    LDL 99 12/16/2019     HDL Cholesterol   Date Value Ref Range Status   12/16/2019 30 (L) >=60 mg/dL Final   ]  GFR Estimate   Date Value Ref Range Status   11/01/2023 >90 >60 mL/min/1.73m2 Final   06/16/2021 90 >60 mL/min/[1.73_m2] Final     Comment:     Non  GFR Calc  Starting 12/18/2018, serum creatinine based estimated GFR (eGFR) will be   calculated using the Chronic Kidney Disease Epidemiology Collaboration   (CKD-EPI) equation.       GFR Estimate If Black   Date Value Ref Range Status   06/16/2021 >90 >60 mL/min/[1.73_m2] Final     Comment:      GFR Calc  Starting 12/18/2018, serum creatinine based estimated GFR (eGFR) will be   calculated using the Chronic Kidney Disease Epidemiology Collaboration   (CKD-EPI) " "equation.       Lab Results   Component Value Date    CR 0.74 11/01/2023    CR 0.81 06/16/2021     No results found for: \"MICROALBUMIN\"    Healthy Eating:  Healthy Eating Assessed Today: Yes  Cultural/Mosque diet restrictions?: No  Do you have any food allergies or intolerances?: No  Meal planning/habits: Low carb  Who cooks/prepares meals for you?: Spouse  Who purchases food in  your home?: Self, Spouse  How many times a week on average do you eat food made away from home (restaurant/take-out)?: 0  Meals include: Breakfast, Lunch, Dinner  Breakfast: Kenan Jose breakfast sandwich and banana  Lunch: grilled cheese sandwich  Dinner: salmon, potatoes, green beans  Snacks: sugar free cookie, nuts, popsicle  Beverages: Diet soda, Milk, Water, Coffee  Has patient met with a dietitian in the past?: Yes    Being Active:  Being Active Assessed Today: Yes  Exercise:: Yes (Just started riding his E Bike again.)  Days per week of moderate to strenuous exercise (like a brisk walk): 2  On average, minutes per day of exercise at this level: 60  How intense was your typical exercise? : Light (like stretching or slow walking)  Exercise Minutes per Week: 120  Barrier to exercise: Physical limitation (Recently hurt his thumb/wrist area - seeing provider on Monday.)    Monitoring:  Monitoring Assessed Today: Yes  Did patient bring glucose meter to appointment? : No (Pt brought written record of glucose levels.)  Blood Glucose Meter: Accu-chek (Guide)  Times checking blood sugar at home (number): 1  Times checking blood sugar at home (per): Day  Blood glucose trend: No change  Fasting-     Taking Medications:  Diabetes Medication(s)       Sodium-Glucose Co-Transporter 2 (SGLT2) Inhibitors       empagliflozin (JARDIANCE) 25 MG TABS tablet Take 1 tablet (25 mg) by mouth daily       Incretin Mimetic Agents       Dulaglutide (TRULICITY) 3 MG/0.5ML SOPN Inject 3 mg Subcutaneous once a week            Taking Medication Assessed " Today: Yes  Current Treatments: Oral Medication (taken by mouth), Non-insulin Injectables (Trulicity 3 mg weekly, Jardiance 25 mg daily)  Problems taking diabetes medications regularly?: No  Diabetes medication side effects?: No    Problem Solving:  Problem Solving Assessed Today: Yes  Is the patient at risk for hypoglycemia?: No  Is the patient at risk for DKA?: No    Reducing Risks:  Reducing Risks Assessed Today: Yes  Diabetes Risks: Age over 45 years  CAD Risks: Diabetes Mellitus, Male sex, Obesity  Has dilated eye exam at least once a year?: Yes  Sees dentist every 6 months?: Yes  Feet checked by healthcare provider in the last year?: Yes    Healthy Coping:  Healthy Coping Assessed Today: Yes  Emotional response to diabetes: Acceptance, Concern for health and well-being  Informal Support system:: Spouse, Family  Stage of change: MAINTENANCE (Working to maintain change, with risk of relapse)  Patient Activation Measure Survey Score:       No data to display                Time Spent: 30 minutes  Encounter Type: Individual    Any diabetes medication dose changes were made via the CDE Protocol per the patient's referring provider. A copy of this encounter was shared with the provider.

## 2024-04-29 NOTE — PATIENT INSTRUCTIONS
-Keep working on healthy food choices.    -Exercise as able.  -Good times to test your glucose are fasting or 2 hours after a meal.  -Target levels are fasting , 2 hours after a meal < 180.   -Keep taking your current diabetes medications.  -A1c today was 7.5% - down from 8.7% at last check in February.  Goal is < 7.5%.  -Follow up in three months.   -Call with any concerns.  PATRIZIA Short, [place] [phone].    
no hearing difficulty/no dysphagia

## 2024-05-28 ENCOUNTER — MEDICAL CORRESPONDENCE (OUTPATIENT)
Dept: MRI IMAGING | Facility: HOSPITAL | Age: 74
End: 2024-05-28

## 2024-06-06 ENCOUNTER — HOSPITAL ENCOUNTER (OUTPATIENT)
Dept: MRI IMAGING | Facility: HOSPITAL | Age: 74
Discharge: HOME | End: 2024-06-06
Attending: FAMILY MEDICINE | Admitting: FAMILY MEDICINE
Payer: COMMERCIAL

## 2024-06-06 DIAGNOSIS — M25.531 PAIN IN RIGHT WRIST: ICD-10-CM

## 2024-06-06 PROCEDURE — 73221 MRI JOINT UPR EXTREM W/O DYE: CPT | Mod: RT

## 2024-09-25 ENCOUNTER — TRANSFERRED RECORDS (OUTPATIENT)
Dept: HEALTH INFORMATION MANAGEMENT | Facility: CLINIC | Age: 74
End: 2024-09-25

## 2024-09-25 LAB — RETINOPATHY: NEGATIVE

## 2024-10-28 ENCOUNTER — HOSPITAL ENCOUNTER (OUTPATIENT)
Dept: EDUCATION SERVICES | Facility: HOSPITAL | Age: 74
Discharge: HOME | End: 2024-10-28
Attending: DIETITIAN, REGISTERED | Admitting: DIETITIAN, REGISTERED
Payer: COMMERCIAL

## 2024-10-28 VITALS
BODY MASS INDEX: 33.96 KG/M2 | WEIGHT: 237.2 LBS | OXYGEN SATURATION: 98 % | DIASTOLIC BLOOD PRESSURE: 74 MMHG | HEIGHT: 70 IN | HEART RATE: 71 BPM | SYSTOLIC BLOOD PRESSURE: 123 MMHG | RESPIRATION RATE: 16 BRPM

## 2024-10-28 DIAGNOSIS — E11.9 TYPE 2 DIABETES MELLITUS WITHOUT COMPLICATION, WITHOUT LONG-TERM CURRENT USE OF INSULIN (H): Primary | ICD-10-CM

## 2024-10-28 LAB — HBA1C MFR BLD: 6.5 % (ref 4.3–?)

## 2024-10-28 PROCEDURE — 83036 HEMOGLOBIN GLYCOSYLATED A1C: CPT | Performed by: DIETITIAN, REGISTERED

## 2024-10-28 PROCEDURE — G0108 DIAB MANAGE TRN  PER INDIV: HCPCS | Performed by: DIETITIAN, REGISTERED

## 2024-10-28 ASSESSMENT — PAIN SCALES - GENERAL: PAINLEVEL_OUTOF10: NO PAIN (0)

## 2024-10-28 NOTE — LETTER
10/28/2024      Donis Watt  [address]  [place] [address]        Diabetes Self-Management Education & Support    Presents for: Individual review    Type of Service: In Person Visit    ASSESSMENT:  A1c today was 6.5% which is in target.  Weight is down 9# since last visit to [facility] in April.  Pt has been using his E-Bike for exercise and plans to use treadmill over the winter months.  He takes diabetes medications as prescribed and monitors fasting glucose levels.  He states eye exam was completed - copy requested from [facility] today.  Foot exam is due - pt sees provider this week.     Patient's most recent   Lab Results   Component Value Date    A1C 7.0 06/22/2022    HEMOGLOBINA1 6.5 10/28/2024     is meeting goal of  <7.5% for age, < 7.0% is better.     Diabetes knowledge and skills assessment:   Will continue to educate on diabetes management concepts as indicated.     Based on learning assessment above, most appropriate setting for further diabetes education would be: Individual setting.      PLAN  Continue exercise regimen over the winter months.   Test glucose 1x/day.  Continue current dose of Trulicity and Jardiance.     Follow-up: 6 months.     See Care Plan for co-developed, patient-state behavior change goals.  AVS provided for patient today.    Education Materials Provided:  No new materials today.     SUBJECTIVE/OBJECTIVE:  Presents for: Individual review  Accompanied by: Self  Diabetes education in the past 24mo: Yes  Focus of Visit: Assistance w/ making life changes  Diabetes type: Type 2  Date of diagnosis: 2008  Disease course: Stable  How confident are you filling out medical forms by yourself:: Quite a bit  Diabetes management related comments/concerns: None  Transportation concerns: No  Difficulty affording diabetes medication?: No  Difficulty affording diabetes testing supplies?: No  Other concerns:: None  Cultural Influences/Ethnic Background:  Not  or     Diabetes  "Symptoms & Complications:  Diabetes Related Symptoms: None  Weight trend: Decreasing (Weight is down 9# since last visit to [facility] in April.)  Symptom course: Stable  Disease course: Stable  Complications assessed today?: Yes  CVA: No  Heart disease: No  Nephropathy: No  Retinopathy: No    Patient Problem List and Family Medical History reviewed for relevant medical history, current medical status, and diabetes risk factors.    Vitals:  /74   Pulse 71   Resp 16   Ht 1.765 m (5' 9.5\")   Wt 107.6 kg (237 lb 3.2 oz)   SpO2 98%   BMI 34.53 kg/m    Estimated body mass index is 34.53 kg/m  as calculated from the following:    Height as of this encounter: 1.765 m (5' 9.5\").    Weight as of this encounter: 107.6 kg (237 lb 3.2 oz).   Last 3 BP:   BP Readings from Last 3 Encounters:   10/28/24 123/74   04/25/24 117/67   11/01/23 180/98       History   Smoking Status     Former     Types: Cigarettes   Smokeless Tobacco     Never       Labs:  Lab Results   Component Value Date    A1C 7.0 06/22/2022    HEMOGLOBINA1 6.5 10/28/2024     Lab Results   Component Value Date     11/01/2023     06/16/2021     Lab Results   Component Value Date    LDL 99 12/16/2019     HDL Cholesterol   Date Value Ref Range Status   12/16/2019 30 (L) >=60 mg/dL Final   ]  GFR Estimate   Date Value Ref Range Status   11/01/2023 >90 >60 mL/min/1.73m2 Final   06/16/2021 90 >60 mL/min/[1.73_m2] Final     Comment:     Non  GFR Calc  Starting 12/18/2018, serum creatinine based estimated GFR (eGFR) will be   calculated using the Chronic Kidney Disease Epidemiology Collaboration   (CKD-EPI) equation.       GFR Estimate If Black   Date Value Ref Range Status   06/16/2021 >90 >60 mL/min/[1.73_m2] Final     Comment:      GFR Calc  Starting 12/18/2018, serum creatinine based estimated GFR (eGFR) will be   calculated using the Chronic Kidney Disease Epidemiology Collaboration   (CKD-EPI) equation.       Lab " "Results   Component Value Date    CR 0.74 11/01/2023    CR 0.81 06/16/2021     No results found for: \"MICROALBUMIN\"    Healthy Eating:  Healthy Eating Assessed Today: Yes  Cultural/Muslim diet restrictions?: No  Do you have any food allergies or intolerances?: No  Meal planning/habits: None  Who cooks/prepares meals for you?: Spouse  Who purchases food in  your home?: Self, Spouse  How many times a week on average do you eat food made away from home (restaurant/take-out)?: 0  Meals include: Breakfast, Lunch, Dinner  Breakfast: 1 egg and banana, small glass of OJ or milk  Lunch: small sandwich with fruit or corn dog or 6 pot stickers  Dinner: 2 hot dogs on bun, small chips, diet soda or spaghetti or chicken with baked potato and salad  Snacks: small ice cream cone  Beverages: Diet soda, Milk, Water, Coffee  Has patient met with a dietitian in the past?: Yes    Being Active:  Being Active Assessed Today: Yes  Exercise:: Yes (Pt has E Bike he rides when able - 3x/week for 120 minutes.  In the winter he will use his treadmill - 20 minutes 3x/day.)  Days per week of moderate to strenuous exercise (like a brisk walk): 3  On average, minutes per day of exercise at this level: 60  How intense was your typical exercise? : Light (like stretching or slow walking)  Exercise Minutes per Week: 180  Barrier to exercise: None    Monitoring:  Monitoring Assessed Today: Yes  Did patient bring glucose meter to appointment? : No (Pt brought written record of glucose levels.)  Blood Glucose Meter: Accu-chek (Guide)  Times checking blood sugar at home (number): 1  Times checking blood sugar at home (per): Day  Blood glucose trend: No change  Rfueeez-    Taking Medications:  Diabetes Medication(s)       Sodium-Glucose Co-Transporter 2 (SGLT2) Inhibitors       empagliflozin (JARDIANCE) 25 MG TABS tablet Take 1 tablet (25 mg) by mouth daily       Incretin Mimetic Agents       Dulaglutide (TRULICITY) 3 MG/0.5ML SOPN Inject 3 mg " Subcutaneous once a week            Taking Medication Assessed Today: Yes  Current Treatments: Oral Medication (taken by mouth), Non-insulin Injectables (Trulicity 3 mg weekly, Jardiance 25 mg daily)  Problems taking diabetes medications regularly?: No  Diabetes medication side effects?: No    Problem Solving:  Problem Solving Assessed Today: Yes  Is the patient at risk for hypoglycemia?: No  Is the patient at risk for DKA?: No    Reducing Risks:  Reducing Risks Assessed Today: Yes  Diabetes Risks: Age over 45 years  CAD Risks: Diabetes Mellitus, Male sex, Obesity  Has dilated eye exam at least once a year?: Yes (Requested from [facility] today.)  Sees dentist every 6 months?: Yes  Feet checked by healthcare provider in the last year?: No (Will have physical with provider next week.)    Healthy Coping:  Healthy Coping Assessed Today: Yes  Emotional response to diabetes: Acceptance, Concern for health and well-being  Informal Support system:: Spouse, Family  Stage of change: MAINTENANCE (Working to maintain change, with risk of relapse)  Patient Activation Measure Survey Score:       No data to display                Time Spent: 30 minutes  Encounter Type: Individual    Any diabetes medication dose changes were made via the CDE Protocol per the patient's referring provider. A copy of this encounter was shared with the provider.       Sincerely,        Ria Morillo RD

## 2024-10-28 NOTE — PROGRESS NOTES
Diabetes Self-Management Education & Support    Presents for: Individual review    Type of Service: In Person Visit    ASSESSMENT:  A1c today was 6.5% which is in target.  Weight is down 9# since last visit to [facility] in April.  Pt has been using his E-Bike for exercise and plans to use treadmill over the winter months.  He takes diabetes medications as prescribed and monitors fasting glucose levels.  He states eye exam was completed - copy requested from [facility] today.  Foot exam is due - pt sees provider this week.     Patient's most recent   Lab Results   Component Value Date    A1C 7.0 06/22/2022    HEMOGLOBINA1 6.5 10/28/2024     is meeting goal of  <7.5% for age, < 7.0% is better.     Diabetes knowledge and skills assessment:   Will continue to educate on diabetes management concepts as indicated.     Based on learning assessment above, most appropriate setting for further diabetes education would be: Individual setting.      PLAN  Continue exercise regimen over the winter months.   Test glucose 1x/day.  Continue current dose of Trulicity and Jardiance.     Follow-up: 6 months.     See Care Plan for co-developed, patient-state behavior change goals.  AVS provided for patient today.    Education Materials Provided:  No new materials today.     SUBJECTIVE/OBJECTIVE:  Presents for: Individual review  Accompanied by: Self  Diabetes education in the past 24mo: Yes  Focus of Visit: Assistance w/ making life changes  Diabetes type: Type 2  Date of diagnosis: 2008  Disease course: Stable  How confident are you filling out medical forms by yourself:: Quite a bit  Diabetes management related comments/concerns: None  Transportation concerns: No  Difficulty affording diabetes medication?: No  Difficulty affording diabetes testing supplies?: No  Other concerns:: None  Cultural Influences/Ethnic Background:  Not  or     Diabetes Symptoms & Complications:  Diabetes Related Symptoms: None  Weight trend: Decreasing  "(Weight is down 9# since last visit to [facility] in April.)  Symptom course: Stable  Disease course: Stable  Complications assessed today?: Yes  CVA: No  Heart disease: No  Nephropathy: No  Retinopathy: No    Patient Problem List and Family Medical History reviewed for relevant medical history, current medical status, and diabetes risk factors.    Vitals:  /74   Pulse 71   Resp 16   Ht 1.765 m (5' 9.5\")   Wt 107.6 kg (237 lb 3.2 oz)   SpO2 98%   BMI 34.53 kg/m    Estimated body mass index is 34.53 kg/m  as calculated from the following:    Height as of this encounter: 1.765 m (5' 9.5\").    Weight as of this encounter: 107.6 kg (237 lb 3.2 oz).   Last 3 BP:   BP Readings from Last 3 Encounters:   10/28/24 123/74   04/25/24 117/67   11/01/23 180/98       History   Smoking Status    Former    Types: Cigarettes   Smokeless Tobacco    Never       Labs:  Lab Results   Component Value Date    A1C 7.0 06/22/2022    HEMOGLOBINA1 6.5 10/28/2024     Lab Results   Component Value Date     11/01/2023     06/16/2021     Lab Results   Component Value Date    LDL 99 12/16/2019     HDL Cholesterol   Date Value Ref Range Status   12/16/2019 30 (L) >=60 mg/dL Final   ]  GFR Estimate   Date Value Ref Range Status   11/01/2023 >90 >60 mL/min/1.73m2 Final   06/16/2021 90 >60 mL/min/[1.73_m2] Final     Comment:     Non  GFR Calc  Starting 12/18/2018, serum creatinine based estimated GFR (eGFR) will be   calculated using the Chronic Kidney Disease Epidemiology Collaboration   (CKD-EPI) equation.       GFR Estimate If Black   Date Value Ref Range Status   06/16/2021 >90 >60 mL/min/[1.73_m2] Final     Comment:      GFR Calc  Starting 12/18/2018, serum creatinine based estimated GFR (eGFR) will be   calculated using the Chronic Kidney Disease Epidemiology Collaboration   (CKD-EPI) equation.       Lab Results   Component Value Date    CR 0.74 11/01/2023    CR 0.81 06/16/2021     No results " "found for: \"MICROALBUMIN\"    Healthy Eating:  Healthy Eating Assessed Today: Yes  Cultural/Bahai diet restrictions?: No  Do you have any food allergies or intolerances?: No  Meal planning/habits: None  Who cooks/prepares meals for you?: Spouse  Who purchases food in  your home?: Self, Spouse  How many times a week on average do you eat food made away from home (restaurant/take-out)?: 0  Meals include: Breakfast, Lunch, Dinner  Breakfast: 1 egg and banana, small glass of OJ or milk  Lunch: small sandwich with fruit or corn dog or 6 pot stickers  Dinner: 2 hot dogs on bun, small chips, diet soda or spaghetti or chicken with baked potato and salad  Snacks: small ice cream cone  Beverages: Diet soda, Milk, Water, Coffee  Has patient met with a dietitian in the past?: Yes    Being Active:  Being Active Assessed Today: Yes  Exercise:: Yes (Pt has E Bike he rides when able - 3x/week for 120 minutes.  In the winter he will use his treadmill - 20 minutes 3x/day.)  Days per week of moderate to strenuous exercise (like a brisk walk): 3  On average, minutes per day of exercise at this level: 60  How intense was your typical exercise? : Light (like stretching or slow walking)  Exercise Minutes per Week: 180  Barrier to exercise: None    Monitoring:  Monitoring Assessed Today: Yes  Did patient bring glucose meter to appointment? : No (Pt brought written record of glucose levels.)  Blood Glucose Meter: Accu-chek (Guide)  Times checking blood sugar at home (number): 1  Times checking blood sugar at home (per): Day  Blood glucose trend: No change  Otzjazj-    Taking Medications:  Diabetes Medication(s)       Sodium-Glucose Co-Transporter 2 (SGLT2) Inhibitors       empagliflozin (JARDIANCE) 25 MG TABS tablet Take 1 tablet (25 mg) by mouth daily       Incretin Mimetic Agents       Dulaglutide (TRULICITY) 3 MG/0.5ML SOPN Inject 3 mg Subcutaneous once a week            Taking Medication Assessed Today: Yes  Current Treatments: " Oral Medication (taken by mouth), Non-insulin Injectables (Trulicity 3 mg weekly, Jardiance 25 mg daily)  Problems taking diabetes medications regularly?: No  Diabetes medication side effects?: No    Problem Solving:  Problem Solving Assessed Today: Yes  Is the patient at risk for hypoglycemia?: No  Is the patient at risk for DKA?: No    Reducing Risks:  Reducing Risks Assessed Today: Yes  Diabetes Risks: Age over 45 years  CAD Risks: Diabetes Mellitus, Male sex, Obesity  Has dilated eye exam at least once a year?: Yes (Requested from [facility] today.)  Sees dentist every 6 months?: Yes  Feet checked by healthcare provider in the last year?: No (Will have physical with provider next week.)    Healthy Coping:  Healthy Coping Assessed Today: Yes  Emotional response to diabetes: Acceptance, Concern for health and well-being  Informal Support system:: Spouse, Family  Stage of change: MAINTENANCE (Working to maintain change, with risk of relapse)  Patient Activation Measure Survey Score:       No data to display                Time Spent: 30 minutes  Encounter Type: Individual    Any diabetes medication dose changes were made via the CDE Protocol per the patient's referring provider. A copy of this encounter was shared with the provider.

## 2024-10-28 NOTE — PATIENT INSTRUCTIONS
-Continue with your exercise plan!  You are doing great!  -Continue current diabetes medications.  -Weight today was 237# - down 9# since last visit in April.  Great job!  -A1c today was 6.5% - great job!  Goal is < 7.5%, < 7.0% is better.    -Follow up in six months or sooner with concerns.  -PATRIZIA Short, [facility] [phone].

## 2024-11-01 ENCOUNTER — TRANSFERRED RECORDS (OUTPATIENT)
Dept: HEALTH INFORMATION MANAGEMENT | Facility: CLINIC | Age: 74
End: 2024-11-01

## 2024-11-08 ENCOUNTER — TRANSFERRED RECORDS (OUTPATIENT)
Dept: HEALTH INFORMATION MANAGEMENT | Facility: CLINIC | Age: 74
End: 2024-11-08

## 2025-04-11 ENCOUNTER — TELEPHONE (OUTPATIENT)
Dept: EDUCATION SERVICES | Facility: OTHER | Age: 75
End: 2025-04-11

## 2025-04-11 DIAGNOSIS — E11.65 TYPE 2 DIABETES MELLITUS WITH HYPERGLYCEMIA, WITHOUT LONG-TERM CURRENT USE OF INSULIN (H): Primary | ICD-10-CM

## 2025-04-11 DIAGNOSIS — E11.9 TYPE 2 DIABETES MELLITUS WITHOUT COMPLICATION, WITHOUT LONG-TERM CURRENT USE OF INSULIN (H): ICD-10-CM

## 2025-04-28 ENCOUNTER — HOSPITAL ENCOUNTER (OUTPATIENT)
Dept: EDUCATION SERVICES | Facility: HOSPITAL | Age: 75
Discharge: HOME | End: 2025-04-28
Attending: DIETITIAN, REGISTERED | Admitting: DIETITIAN, REGISTERED
Payer: COMMERCIAL

## 2025-04-28 VITALS
HEIGHT: 70 IN | OXYGEN SATURATION: 95 % | WEIGHT: 242.9 LBS | SYSTOLIC BLOOD PRESSURE: 131 MMHG | DIASTOLIC BLOOD PRESSURE: 81 MMHG | RESPIRATION RATE: 16 BRPM | HEART RATE: 84 BPM | BODY MASS INDEX: 34.77 KG/M2

## 2025-04-28 DIAGNOSIS — E11.9 TYPE 2 DIABETES MELLITUS WITHOUT COMPLICATION, WITHOUT LONG-TERM CURRENT USE OF INSULIN (H): Primary | ICD-10-CM

## 2025-04-28 LAB — HBA1C MFR BLD: 7 % (ref 4.3–?)

## 2025-04-28 PROCEDURE — 83036 HEMOGLOBIN GLYCOSYLATED A1C: CPT | Performed by: DIETITIAN, REGISTERED

## 2025-04-28 PROCEDURE — G0108 DIAB MANAGE TRN  PER INDIV: HCPCS | Performed by: DIETITIAN, REGISTERED

## 2025-04-28 ASSESSMENT — PAIN SCALES - GENERAL: PAINLEVEL_OUTOF10: NO PAIN (0)

## 2025-04-28 NOTE — PATIENT INSTRUCTIONS
-Work on resuming limiting high carbohydrate food items now that your teeth are better.   -Try to increase you exercise.    -Good times to check are fasting or 2 hours after a meal.  -Target glucose levels are fasting , 2 hours after a meal < 180.  -A1c today was 7.0% - up from 6.5%.  Goal is < 7.5%, < 7.0% is better.  -Follow up Fall 2025.   -Call with any concerns - PATRIZIA Short, [facility] [phone].

## 2025-04-28 NOTE — PROGRESS NOTES
Diabetes Self-Management Education & Support    Presents for: Individual review    Type of Service: In Person Visit    Assessment  A1c today was 7.0% which is up from 6.5% at previous A1c in October.  Pt reports he has had issues with two of his teeth which altered foods he could consume.  He feels he was eating more carbohydrates.  Teeth have now been removed.  Meter download notes he only has been testing fasting but levels are up from previous.  Pt is not interested in medication adjust at this time.  He had issues with diarrhea the last time we increased his Trulicity.  Eye exam and foot exam are up to date.  Cataract surgery planned in May.      Patient's most recent   Lab Results   Component Value Date    A1C 7.0 06/22/2022    HEMOGLOBINA1 7.0 04/28/2025     is meeting goal of  <7.5%, < 7.0% is better.     Diabetes knowledge and skills assessment:   Patient is knowledgeable in diabetes management concepts related to: Healthy Eating, Being Active, Monitoring, Taking Medication, Problem Solving, Reducing Risks, and Healthy Coping    Based on learning assessment above, most appropriate setting for further diabetes education would be: Individual setting.    Care Plan and Education Provided:  Will continue to educate on diabetes management concepts as indicated.      Patient verbalized understanding of diabetes self-management education concepts discussed, opportunities for ongoing education and support, and recommendations provided today.    Plan  Pt will work on limiting carbohydrates now that he is able to eat all foods.   Increase exercise in summer months - has an E-Bike.    Test glucose 1x/day - alternate times.    Follow-up:  Upcoming Diabetes Ed Appointments     Visit Type Date Time Department    DIABETES ED 4/28/2025  8:30 AM [facility]    DIABETES ED 10/29/2025  8:30 AM [facility]        See Care Plan for co-developed, patient-state behavior change goals.    Education Materials Provided:  No new  "materials today.     Subjective/Objective  Donis is an 75 year old, presenting for the following diabetes education related to: Individual review  Cultural Influences/Ethnic Background:  Not  or     Diabetes Symptoms & Complications:  Diabetes Related Symptoms: Visual change (Will have cataract surgery May 2025)  Weight trend: Stable  Symptom course: Stable  Disease course: Stable  Complications assessed today?: Yes  CVA: No  Heart disease: No  Nephropathy: No  Retinopathy: No    Patient Problem List and Family Medical History reviewed for relevant medical history, current medical status, and diabetes risk factors.    Vitals:  /81   Pulse 84   Resp 16   Ht 1.765 m (5' 9.5\")   Wt 110.2 kg (242 lb 14.4 oz)   SpO2 95%   BMI 35.36 kg/m    Estimated body mass index is 35.36 kg/m  as calculated from the following:    Height as of this encounter: 1.765 m (5' 9.5\").    Weight as of this encounter: 110.2 kg (242 lb 14.4 oz).   Last 3 BP:   BP Readings from Last 3 Encounters:   04/28/25 131/81   10/28/24 123/74   04/25/24 117/67       History   Smoking Status    Former    Types: Cigarettes   Smokeless Tobacco    Never       Labs:  Lab Results   Component Value Date    A1C 7.0 06/22/2022    HEMOGLOBINA1 7.0 04/28/2025     Lab Results   Component Value Date     11/01/2023     06/16/2021     Lab Results   Component Value Date    LDL 99 12/16/2019     HDL Cholesterol   Date Value Ref Range Status   12/16/2019 30 (L) >=60 mg/dL Final   ]  GFR Estimate   Date Value Ref Range Status   11/01/2023 >90 >60 mL/min/1.73m2 Final   06/16/2021 90 >60 mL/min/[1.73_m2] Final     Comment:     Non  GFR Calc  Starting 12/18/2018, serum creatinine based estimated GFR (eGFR) will be   calculated using the Chronic Kidney Disease Epidemiology Collaboration   (CKD-EPI) equation.       GFR Estimate If Black   Date Value Ref Range Status   06/16/2021 >90 >60 mL/min/[1.73_m2] Final     Comment: " "     GFR Calc  Starting 12/18/2018, serum creatinine based estimated GFR (eGFR) will be   calculated using the Chronic Kidney Disease Epidemiology Collaboration   (CKD-EPI) equation.       Lab Results   Component Value Date    CR 0.74 11/01/2023    CR 0.81 06/16/2021     No results found for: \"MICROL\", \"UMALCR\", \"UCRR\"        4/28/2025   Healthy Eating   Healthy Eating Assessed Today Yes   Cultural/Yazidi diet restrictions? No   Do you have any food allergies or intolerances? No   Meal planning/habits None   Who cooks/prepares meals for you? Spouse   Who purchases food in  your home? Self;Spouse   How many times a week on average do you eat food made away from home (restaurant/take-out)? 0   Meals include Breakfast;Lunch;Dinner   Breakfast breakfast sandwich on english muffin or biscuit and banana   Lunch leftover   Dinner pizza (2) or ham and pototoes or sloppy joes (2)   Snacks sugar free cookie or yogurt or sugar free jello   Other Pt states he was eating more carbs recently as he had two teeth pulled.   Beverages Diet soda;Milk;Water;Coffee   Has patient met with a dietitian in the past? Yes         4/28/2025   Being Active   Being Active Assessed Today Yes   Exercise: Yes   Days per week of moderate to strenuous exercise (like a brisk walk) 3   On average, minutes per day of exercise at this level 60   How intense was your typical exercise?  Light (like stretching or slow walking)   Exercise Minutes per Week 180   Barrier to exercise None         4/28/2025   Monitoring   Monitoring Assessed Today Yes   Did patient bring glucose meter to appointment?  Yes   Blood Glucose Meter Accu-chek   Times checking blood sugar at home (number) 1   Times checking blood sugar at home (per) Day   Blood glucose trend Increasing   Fasting-139, 134, 140, 129, 166, 166, 125, 151, 139, 147, 127, 189, 137    Diabetes Medication(s)       Sodium-Glucose Co-Transporter 2 (SGLT2) Inhibitors       empagliflozin " (JARDIANCE) 25 MG TABS tablet Take 1 tablet (25 mg) by mouth daily       Incretin Mimetic Agents       Dulaglutide (TRULICITY) 3 MG/0.5ML SOPN Inject 3 mg Subcutaneous once a week              4/28/2025   Taking Medications   Taking Medication Assessed Today Yes   Current Treatments Oral Medication (taken by mouth);Non-insulin Injectables   Problems taking diabetes medications regularly? No   Diabetes medication side effects? No         4/28/2025   Problem Solving   Problem Solving Assessed Today Yes   Is the patient at risk for hypoglycemia? No   Is the patient at risk for DKA? No           4/28/2025   Reducing Risks   Reducing Risks Assessed Today Yes   Diabetes Risks Age over 45 years   CAD Risks Diabetes Mellitus;Male sex;Obesity   Has dilated eye exam at least once a year? Yes   Sees dentist every 6 months? Yes   Feet checked by healthcare provider in the last year? Yes       PATRIZIA Short, [facility]  Time Spent: 30 minutes  Encounter Type: Individual    Any diabetes medication dose changes were made via the [facility] Standing Orders under the patient's referring provider.